# Patient Record
Sex: FEMALE | Race: WHITE | NOT HISPANIC OR LATINO | URBAN - METROPOLITAN AREA
[De-identification: names, ages, dates, MRNs, and addresses within clinical notes are randomized per-mention and may not be internally consistent; named-entity substitution may affect disease eponyms.]

---

## 2019-03-11 ENCOUNTER — EMERGENCY (EMERGENCY)
Facility: HOSPITAL | Age: 79
LOS: 1 days | Discharge: HOME | End: 2019-03-11
Attending: EMERGENCY MEDICINE | Admitting: EMERGENCY MEDICINE
Payer: COMMERCIAL

## 2019-03-11 VITALS
SYSTOLIC BLOOD PRESSURE: 123 MMHG | TEMPERATURE: 98 F | HEIGHT: 63 IN | RESPIRATION RATE: 18 BRPM | HEART RATE: 75 BPM | OXYGEN SATURATION: 96 % | DIASTOLIC BLOOD PRESSURE: 69 MMHG | WEIGHT: 115.08 LBS

## 2019-03-11 VITALS
OXYGEN SATURATION: 97 % | SYSTOLIC BLOOD PRESSURE: 113 MMHG | DIASTOLIC BLOOD PRESSURE: 63 MMHG | HEART RATE: 70 BPM | RESPIRATION RATE: 15 BRPM | TEMPERATURE: 98 F

## 2019-03-11 LAB
ALBUMIN SERPL ELPH-MCNC: 3.7 G/DL — SIGNIFICANT CHANGE UP (ref 3.3–5)
ALP SERPL-CCNC: 70 U/L — SIGNIFICANT CHANGE UP (ref 40–120)
ALT FLD-CCNC: 26 U/L — SIGNIFICANT CHANGE UP (ref 12–78)
ANION GAP SERPL CALC-SCNC: 8 MMOL/L — SIGNIFICANT CHANGE UP (ref 5–17)
APPEARANCE UR: CLEAR — SIGNIFICANT CHANGE UP
AST SERPL-CCNC: 17 U/L — SIGNIFICANT CHANGE UP (ref 15–37)
BASOPHILS # BLD AUTO: 0.03 K/UL — SIGNIFICANT CHANGE UP (ref 0–0.2)
BASOPHILS NFR BLD AUTO: 0.3 % — SIGNIFICANT CHANGE UP (ref 0–2)
BILIRUB SERPL-MCNC: 0.6 MG/DL — SIGNIFICANT CHANGE UP (ref 0.2–1.2)
BILIRUB UR-MCNC: NEGATIVE — SIGNIFICANT CHANGE UP
BUN SERPL-MCNC: 15 MG/DL — SIGNIFICANT CHANGE UP (ref 7–23)
CALCIUM SERPL-MCNC: 9 MG/DL — SIGNIFICANT CHANGE UP (ref 8.5–10.1)
CHLORIDE SERPL-SCNC: 103 MMOL/L — SIGNIFICANT CHANGE UP (ref 96–108)
CO2 SERPL-SCNC: 29 MMOL/L — SIGNIFICANT CHANGE UP (ref 22–31)
COLOR SPEC: SIGNIFICANT CHANGE UP
CREAT SERPL-MCNC: 0.91 MG/DL — SIGNIFICANT CHANGE UP (ref 0.5–1.3)
DIFF PNL FLD: NEGATIVE — SIGNIFICANT CHANGE UP
EOSINOPHIL # BLD AUTO: 0.03 K/UL — SIGNIFICANT CHANGE UP (ref 0–0.5)
EOSINOPHIL NFR BLD AUTO: 0.3 % — SIGNIFICANT CHANGE UP (ref 0–6)
GLUCOSE SERPL-MCNC: 95 MG/DL — SIGNIFICANT CHANGE UP (ref 70–99)
GLUCOSE UR QL: NEGATIVE — SIGNIFICANT CHANGE UP
HCT VFR BLD CALC: 37.2 % — SIGNIFICANT CHANGE UP (ref 34.5–45)
HGB BLD-MCNC: 12.5 G/DL — SIGNIFICANT CHANGE UP (ref 11.5–15.5)
IMM GRANULOCYTES NFR BLD AUTO: 0.2 % — SIGNIFICANT CHANGE UP (ref 0–1.5)
KETONES UR-MCNC: NEGATIVE — SIGNIFICANT CHANGE UP
LEUKOCYTE ESTERASE UR-ACNC: NEGATIVE — SIGNIFICANT CHANGE UP
LIDOCAIN IGE QN: 117 U/L — SIGNIFICANT CHANGE UP (ref 73–393)
LYMPHOCYTES # BLD AUTO: 0.84 K/UL — LOW (ref 1–3.3)
LYMPHOCYTES # BLD AUTO: 8.6 % — LOW (ref 13–44)
MCHC RBC-ENTMCNC: 32 PG — SIGNIFICANT CHANGE UP (ref 27–34)
MCHC RBC-ENTMCNC: 33.6 GM/DL — SIGNIFICANT CHANGE UP (ref 32–36)
MCV RBC AUTO: 95.1 FL — SIGNIFICANT CHANGE UP (ref 80–100)
MONOCYTES # BLD AUTO: 0.65 K/UL — SIGNIFICANT CHANGE UP (ref 0–0.9)
MONOCYTES NFR BLD AUTO: 6.6 % — SIGNIFICANT CHANGE UP (ref 2–14)
NEUTROPHILS # BLD AUTO: 8.21 K/UL — HIGH (ref 1.8–7.4)
NEUTROPHILS NFR BLD AUTO: 84 % — HIGH (ref 43–77)
NITRITE UR-MCNC: NEGATIVE — SIGNIFICANT CHANGE UP
NRBC # BLD: 0 /100 WBCS — SIGNIFICANT CHANGE UP (ref 0–0)
PH UR: 8 — SIGNIFICANT CHANGE UP (ref 5–8)
PLATELET # BLD AUTO: 232 K/UL — SIGNIFICANT CHANGE UP (ref 150–400)
POTASSIUM SERPL-MCNC: 3.7 MMOL/L — SIGNIFICANT CHANGE UP (ref 3.5–5.3)
POTASSIUM SERPL-SCNC: 3.7 MMOL/L — SIGNIFICANT CHANGE UP (ref 3.5–5.3)
PROT SERPL-MCNC: 8.3 G/DL — SIGNIFICANT CHANGE UP (ref 6–8.3)
PROT UR-MCNC: NEGATIVE — SIGNIFICANT CHANGE UP
RBC # BLD: 3.91 M/UL — SIGNIFICANT CHANGE UP (ref 3.8–5.2)
RBC # FLD: 12.4 % — SIGNIFICANT CHANGE UP (ref 10.3–14.5)
SODIUM SERPL-SCNC: 140 MMOL/L — SIGNIFICANT CHANGE UP (ref 135–145)
SP GR SPEC: 1 — LOW (ref 1.01–1.02)
UROBILINOGEN FLD QL: NEGATIVE — SIGNIFICANT CHANGE UP
WBC # BLD: 9.78 K/UL — SIGNIFICANT CHANGE UP (ref 3.8–10.5)
WBC # FLD AUTO: 9.78 K/UL — SIGNIFICANT CHANGE UP (ref 3.8–10.5)

## 2019-03-11 PROCEDURE — 80053 COMPREHEN METABOLIC PANEL: CPT

## 2019-03-11 PROCEDURE — 83690 ASSAY OF LIPASE: CPT

## 2019-03-11 PROCEDURE — 74177 CT ABD & PELVIS W/CONTRAST: CPT | Mod: 26

## 2019-03-11 PROCEDURE — 74177 CT ABD & PELVIS W/CONTRAST: CPT

## 2019-03-11 PROCEDURE — 99284 EMERGENCY DEPT VISIT MOD MDM: CPT

## 2019-03-11 PROCEDURE — 87086 URINE CULTURE/COLONY COUNT: CPT

## 2019-03-11 PROCEDURE — 81003 URINALYSIS AUTO W/O SCOPE: CPT

## 2019-03-11 PROCEDURE — 99284 EMERGENCY DEPT VISIT MOD MDM: CPT | Mod: 25

## 2019-03-11 PROCEDURE — 36415 COLL VENOUS BLD VENIPUNCTURE: CPT

## 2019-03-11 PROCEDURE — 85027 COMPLETE CBC AUTOMATED: CPT

## 2019-03-11 RX ORDER — METRONIDAZOLE 500 MG
1 TABLET ORAL
Qty: 30 | Refills: 0 | OUTPATIENT
Start: 2019-03-11 | End: 2019-03-20

## 2019-03-11 RX ORDER — LEVOTHYROXINE SODIUM 125 MCG
1 TABLET ORAL
Qty: 30 | Refills: 0 | OUTPATIENT
Start: 2019-03-11 | End: 2019-04-09

## 2019-03-11 RX ORDER — IOHEXOL 300 MG/ML
30 INJECTION, SOLUTION INTRAVENOUS ONCE
Qty: 0 | Refills: 0 | Status: COMPLETED | OUTPATIENT
Start: 2019-03-11 | End: 2019-03-11

## 2019-03-11 RX ORDER — SODIUM CHLORIDE 9 MG/ML
3 INJECTION INTRAMUSCULAR; INTRAVENOUS; SUBCUTANEOUS ONCE
Qty: 0 | Refills: 0 | Status: COMPLETED | OUTPATIENT
Start: 2019-03-11 | End: 2019-03-11

## 2019-03-11 RX ORDER — ONDANSETRON 8 MG/1
1 TABLET, FILM COATED ORAL
Qty: 21 | Refills: 0 | OUTPATIENT
Start: 2019-03-11 | End: 2019-03-17

## 2019-03-11 RX ORDER — SODIUM CHLORIDE 9 MG/ML
1000 INJECTION INTRAMUSCULAR; INTRAVENOUS; SUBCUTANEOUS ONCE
Qty: 0 | Refills: 0 | Status: COMPLETED | OUTPATIENT
Start: 2019-03-11 | End: 2019-03-11

## 2019-03-11 RX ADMIN — IOHEXOL 30 MILLILITER(S): 300 INJECTION, SOLUTION INTRAVENOUS at 13:56

## 2019-03-11 RX ADMIN — SODIUM CHLORIDE 3 MILLILITER(S): 9 INJECTION INTRAMUSCULAR; INTRAVENOUS; SUBCUTANEOUS at 13:20

## 2019-03-11 RX ADMIN — SODIUM CHLORIDE 1000 MILLILITER(S): 9 INJECTION INTRAMUSCULAR; INTRAVENOUS; SUBCUTANEOUS at 13:56

## 2019-03-11 NOTE — ED PROVIDER NOTE - OBJECTIVE STATEMENT
Pt is a 80 yo female who presents to the ED with a cc of abdominal pain.  PMHx of hypothyroidism, HLD, diverticulosis.  Pt reports that she has been experiencing intermittent RLQ colicky pain but that symptoms became more constant and increased in severity yesterday evening.  Pt reports that around 11 pm pt awoke with severe stabbing RLQ pain worse with movement.  Pt reports that the pain kept her up Pt is a 80 yo female who presents to the ED with a cc of abdominal pain.  PMHx of hypothyroidism, HLD, diverticulosis.  Pt reports that she has been experiencing intermittent RLQ colicky pain but that symptoms became more constant and increased in severity yesterday evening.  Pt reports that around 11 pm pt awoke with severe stabbing RLQ pain worse with movement.  Pt reports that the pain kept her up throughout the night.  She does report that she had been constipated throughout the week but this morning prior to arrival she was able to have a bowel movement non bloody and soft in nature.  She further reports that upon arrival to the ED symptoms improved.  Pt spoke with her GI physician and was told to come to the ED for further work up.  She reports that she does have a history of diverticulosis and has had multiple flares her last flare being in 2017.  Pt reports that her last colonoscopy was 10 yr ago although she did cologuard this past January. Denies fever, chills, N/V, CP, SOB, ext numbness or weakness

## 2019-03-11 NOTE — ED PROVIDER NOTE - NS ED NOTE AC HIGH RISK COUNTRIES
Chief Complaint   Patient presents with    Fever    Ear Pain     left    Sore Throat     1. Have you been to the ER, urgent care clinic since your last visit? Hospitalized since your last visit? No    2. Have you seen or consulted any other health care providers outside of the [address] [place] [address] since your last visit? Include any pap smears or colon screening.  No No

## 2019-03-11 NOTE — ED PROVIDER NOTE - PLAN OF CARE
Return to the ED for any new or worsening symptoms  Take your medication as prescribed  Augmentin 1 tab 2 times a day   Flagyl 1 tab 3 times a day   Zofran per label instructions as needed for nausea   Clear liquids advance as tolerated   Follow up with your gastroenterologist within the week   Advance activity as tolerated

## 2019-03-11 NOTE — ED PROVIDER NOTE - CARE PLAN
Principal Discharge DX:	Diverticulitis  Assessment and plan of treatment:	Return to the ED for any new or worsening symptoms  Take your medication as prescribed  Augmentin 1 tab 2 times a day   Flagyl 1 tab 3 times a day   Zofran per label instructions as needed for nausea   Clear liquids advance as tolerated   Follow up with your gastroenterologist within the week   Advance activity as tolerated  Secondary Diagnosis:	Abdominal pain

## 2019-03-11 NOTE — ED PROVIDER NOTE - PROGRESS NOTE DETAILS
Spoke with Dr. Parisi regarding CT abd/pelvis.  Diverticulitis noted.  Pt with normal WBC and no active nausea and vomiting.  Pt stable for discharge home with outpatient follow up.  Results of labs and images reviewed, copy provided, all questions answered.

## 2019-03-12 LAB
CULTURE RESULTS: SIGNIFICANT CHANGE UP
SPECIMEN SOURCE: SIGNIFICANT CHANGE UP

## 2019-03-15 ENCOUNTER — EMERGENCY (EMERGENCY)
Facility: HOSPITAL | Age: 79
LOS: 1 days | Discharge: HOME | End: 2019-03-15
Attending: INTERNAL MEDICINE | Admitting: EMERGENCY MEDICINE
Payer: COMMERCIAL

## 2019-03-15 VITALS
HEART RATE: 70 BPM | OXYGEN SATURATION: 98 % | RESPIRATION RATE: 14 BRPM | DIASTOLIC BLOOD PRESSURE: 70 MMHG | TEMPERATURE: 98 F | SYSTOLIC BLOOD PRESSURE: 120 MMHG

## 2019-03-15 VITALS
HEIGHT: 63 IN | TEMPERATURE: 98 F | OXYGEN SATURATION: 97 % | WEIGHT: 149.91 LBS | DIASTOLIC BLOOD PRESSURE: 67 MMHG | SYSTOLIC BLOOD PRESSURE: 125 MMHG | RESPIRATION RATE: 16 BRPM | HEART RATE: 74 BPM

## 2019-03-15 DIAGNOSIS — Z98.890 OTHER SPECIFIED POSTPROCEDURAL STATES: Chronic | ICD-10-CM

## 2019-03-15 PROBLEM — E03.9 HYPOTHYROIDISM, UNSPECIFIED: Chronic | Status: ACTIVE | Noted: 2019-03-11

## 2019-03-15 PROBLEM — E78.5 HYPERLIPIDEMIA, UNSPECIFIED: Chronic | Status: ACTIVE | Noted: 2019-03-11

## 2019-03-15 PROBLEM — K57.90 DIVERTICULOSIS OF INTESTINE, PART UNSPECIFIED, WITHOUT PERFORATION OR ABSCESS WITHOUT BLEEDING: Chronic | Status: ACTIVE | Noted: 2019-03-11

## 2019-03-15 LAB
ALBUMIN SERPL ELPH-MCNC: 3.3 G/DL — SIGNIFICANT CHANGE UP (ref 3.3–5)
ALP SERPL-CCNC: 67 U/L — SIGNIFICANT CHANGE UP (ref 40–120)
ALT FLD-CCNC: 31 U/L — SIGNIFICANT CHANGE UP (ref 12–78)
ANION GAP SERPL CALC-SCNC: 9 MMOL/L — SIGNIFICANT CHANGE UP (ref 5–17)
APPEARANCE UR: CLEAR — SIGNIFICANT CHANGE UP
AST SERPL-CCNC: 31 U/L — SIGNIFICANT CHANGE UP (ref 15–37)
BACTERIA # UR AUTO: NEGATIVE — SIGNIFICANT CHANGE UP
BILIRUB SERPL-MCNC: 0.3 MG/DL — SIGNIFICANT CHANGE UP (ref 0.2–1.2)
BILIRUB UR-MCNC: NEGATIVE — SIGNIFICANT CHANGE UP
BUN SERPL-MCNC: 16 MG/DL — SIGNIFICANT CHANGE UP (ref 7–23)
CALCIUM SERPL-MCNC: 8.9 MG/DL — SIGNIFICANT CHANGE UP (ref 8.5–10.1)
CHLORIDE SERPL-SCNC: 103 MMOL/L — SIGNIFICANT CHANGE UP (ref 96–108)
CO2 SERPL-SCNC: 28 MMOL/L — SIGNIFICANT CHANGE UP (ref 22–31)
COLOR SPEC: YELLOW — SIGNIFICANT CHANGE UP
CREAT SERPL-MCNC: 1 MG/DL — SIGNIFICANT CHANGE UP (ref 0.5–1.3)
DIFF PNL FLD: NEGATIVE — SIGNIFICANT CHANGE UP
EPI CELLS # UR: NEGATIVE — SIGNIFICANT CHANGE UP
GLUCOSE SERPL-MCNC: 113 MG/DL — HIGH (ref 70–99)
GLUCOSE UR QL: NEGATIVE — SIGNIFICANT CHANGE UP
HCT VFR BLD CALC: 35.5 % — SIGNIFICANT CHANGE UP (ref 34.5–45)
HGB BLD-MCNC: 11.9 G/DL — SIGNIFICANT CHANGE UP (ref 11.5–15.5)
INR BLD: 1.27 RATIO — HIGH (ref 0.88–1.16)
KETONES UR-MCNC: NEGATIVE — SIGNIFICANT CHANGE UP
LACTATE SERPL-SCNC: 0.9 MMOL/L — SIGNIFICANT CHANGE UP (ref 0.7–2)
LEUKOCYTE ESTERASE UR-ACNC: ABNORMAL
MCHC RBC-ENTMCNC: 31.9 PG — SIGNIFICANT CHANGE UP (ref 27–34)
MCHC RBC-ENTMCNC: 33.5 GM/DL — SIGNIFICANT CHANGE UP (ref 32–36)
MCV RBC AUTO: 95.2 FL — SIGNIFICANT CHANGE UP (ref 80–100)
NITRITE UR-MCNC: NEGATIVE — SIGNIFICANT CHANGE UP
NRBC # BLD: 0 /100 WBCS — SIGNIFICANT CHANGE UP (ref 0–0)
PH UR: 8 — SIGNIFICANT CHANGE UP (ref 5–8)
PLATELET # BLD AUTO: 230 K/UL — SIGNIFICANT CHANGE UP (ref 150–400)
POTASSIUM SERPL-MCNC: 3.6 MMOL/L — SIGNIFICANT CHANGE UP (ref 3.5–5.3)
POTASSIUM SERPL-SCNC: 3.6 MMOL/L — SIGNIFICANT CHANGE UP (ref 3.5–5.3)
PROT SERPL-MCNC: 7.4 G/DL — SIGNIFICANT CHANGE UP (ref 6–8.3)
PROT UR-MCNC: NEGATIVE — SIGNIFICANT CHANGE UP
PROTHROM AB SERPL-ACNC: 14.5 SEC — HIGH (ref 10–12.9)
RBC # BLD: 3.73 M/UL — LOW (ref 3.8–5.2)
RBC # FLD: 12.3 % — SIGNIFICANT CHANGE UP (ref 10.3–14.5)
RBC CASTS # UR COMP ASSIST: SIGNIFICANT CHANGE UP /HPF (ref 0–4)
SODIUM SERPL-SCNC: 140 MMOL/L — SIGNIFICANT CHANGE UP (ref 135–145)
SP GR SPEC: 1 — LOW (ref 1.01–1.02)
TROPONIN I SERPL-MCNC: <.015 NG/ML — SIGNIFICANT CHANGE UP (ref 0.01–0.04)
UROBILINOGEN FLD QL: NEGATIVE — SIGNIFICANT CHANGE UP
WBC # BLD: 5.7 K/UL — SIGNIFICANT CHANGE UP (ref 3.8–10.5)
WBC # FLD AUTO: 5.7 K/UL — SIGNIFICANT CHANGE UP (ref 3.8–10.5)
WBC UR QL: SIGNIFICANT CHANGE UP

## 2019-03-15 PROCEDURE — 71045 X-RAY EXAM CHEST 1 VIEW: CPT

## 2019-03-15 PROCEDURE — 71045 X-RAY EXAM CHEST 1 VIEW: CPT | Mod: 26

## 2019-03-15 PROCEDURE — 99284 EMERGENCY DEPT VISIT MOD MDM: CPT

## 2019-03-15 PROCEDURE — 76700 US EXAM ABDOM COMPLETE: CPT

## 2019-03-15 PROCEDURE — 93010 ELECTROCARDIOGRAM REPORT: CPT

## 2019-03-15 PROCEDURE — 87086 URINE CULTURE/COLONY COUNT: CPT

## 2019-03-15 PROCEDURE — 93005 ELECTROCARDIOGRAM TRACING: CPT

## 2019-03-15 PROCEDURE — 76700 US EXAM ABDOM COMPLETE: CPT | Mod: 26

## 2019-03-15 PROCEDURE — 84484 ASSAY OF TROPONIN QUANT: CPT

## 2019-03-15 PROCEDURE — 99284 EMERGENCY DEPT VISIT MOD MDM: CPT | Mod: 25

## 2019-03-15 PROCEDURE — 83605 ASSAY OF LACTIC ACID: CPT

## 2019-03-15 PROCEDURE — 80053 COMPREHEN METABOLIC PANEL: CPT

## 2019-03-15 PROCEDURE — 81001 URINALYSIS AUTO W/SCOPE: CPT

## 2019-03-15 PROCEDURE — 85610 PROTHROMBIN TIME: CPT

## 2019-03-15 PROCEDURE — 85027 COMPLETE CBC AUTOMATED: CPT

## 2019-03-15 PROCEDURE — 36415 COLL VENOUS BLD VENIPUNCTURE: CPT

## 2019-03-15 RX ORDER — SODIUM CHLORIDE 9 MG/ML
1000 INJECTION INTRAMUSCULAR; INTRAVENOUS; SUBCUTANEOUS ONCE
Qty: 0 | Refills: 0 | Status: COMPLETED | OUTPATIENT
Start: 2019-03-15 | End: 2019-03-15

## 2019-03-15 RX ADMIN — SODIUM CHLORIDE 1000 MILLILITER(S): 9 INJECTION INTRAMUSCULAR; INTRAVENOUS; SUBCUTANEOUS at 07:47

## 2019-03-15 NOTE — ED PROVIDER NOTE - OBJECTIVE STATEMENT
80 y/o WF h/o Diverticulitis Hyperlipidemia  Hypothyroidism. The patient became faint, diaphoretic and collapsed. She has lower abdominal pain, dark urine, no N/V/D. She did not lose consciousness, No CP, NO SOB, no N/V/D, no fever, no chills no acute stroke symptoms. 78 y/o WF h/o Diverticulitis Hyperlipidemia  Hypothyroidism. The patient became faint but did not pass out. She became diaphoretic, weak at the knees and contused her left buttock. She has lower abdominal pain since Tuesday and is being treated for diverticulitis. Her was dark in appearance. She did not lose consciousness, No CP, NO SOB, no N/V/D, no fever, no chills no acute stroke symptoms. 78 y/o WF h/o Diverticulitis Hyperlipidemia  Hypothyroidism. The patient became faint but did not pass out. She became diaphoretic, weak at the knees and contused her left buttock. She has lower abdominal pain since Tuesday and is being treated for diverticulitis. Her was dark in appearance. She did not lose consciousness, No CP, NO SOB, no N/V/D, no fever, no chills no acute stroke symptoms.  GI attending Dr Oswald

## 2019-03-15 NOTE — ED PROVIDER NOTE - CARE PROVIDER_API CALL
Luis Oswald)  Gastroenterology; Internal Medicine  [address] [place] [address], [address]  [place] [address]  Phone: [phone]  Fax: [phone]  Follow Up Time:

## 2019-03-15 NOTE — ED PROVIDER NOTE - NSFOLLOWUPINSTRUCTIONS_ED_ALL_ED_FT
1) Follow-up with Dr. Oswald. Call today / next business day for prompt follow-up.  2) Return to Emergency room for any worsening or persistent pain, weakness, fever, or any other concerning symptoms.  3) See attached instruction sheets for additional information, including information regarding signs and symptoms to look out for, reasons to seek immediate care and other important instructions.  4) Continue antibiotics as prescribed

## 2019-03-15 NOTE — ED PROVIDER NOTE - CARE PLAN
Principal Discharge DX:	Vagal reaction Principal Discharge DX:	Vagal reaction  Secondary Diagnosis:	Sigmoid diverticulitis

## 2019-03-15 NOTE — ED ADULT NURSE NOTE - NSIMPLEMENTINTERV_GEN_ALL_ED
Implemented All Fall Risk Interventions:  [place] to call system. Call bell, personal items and telephone within reach. Instruct patient to call for assistance. Room bathroom lighting operational. Non-slip footwear when patient is off stretcher. Physically safe environment: no spills, clutter or unnecessary equipment. Stretcher in lowest position, wheels locked, appropriate side rails in place. Provide visual cue, wrist band, yellow gown, etc. Monitor gait and stability. Monitor for mental status changes and reorient to person, place, and time. Review medications for side effects contributing to fall risk. Reinforce activity limits and safety measures with patient and family.

## 2019-03-15 NOTE — ED ADULT TRIAGE NOTE - CHIEF COMPLAINT QUOTE
generalized weakness and lower abdominal pain. " My legs gave out when I walled to the bathroom. I did not pass out. "

## 2019-03-15 NOTE — CONSULT NOTE ADULT - SUBJECTIVE AND OBJECTIVE BOX
79 year old female awoke around 2:45am with severe right sided abdominal pain. She went to the bathroom and nearly fainted from the pain. She also noted her urine to be very dark.    The patient was in this ER earlier this week and CT confirmed Sigmoid diverticulitis  There was mild right hydro with no obvious stone.    Abdomen  Mild RUQ tenderness.  No CVA tenderness..

## 2019-03-16 ENCOUNTER — INPATIENT (INPATIENT)
Facility: HOSPITAL | Age: 79
LOS: 12 days | Discharge: NURSING, REHAB OR LONG-TERM CARE | DRG: 330 | End: 2019-03-29
Attending: INTERNAL MEDICINE | Admitting: INTERNAL MEDICINE
Payer: COMMERCIAL

## 2019-03-16 VITALS
HEART RATE: 84 BPM | WEIGHT: 115.08 LBS | OXYGEN SATURATION: 100 % | HEIGHT: 63 IN | RESPIRATION RATE: 18 BRPM | DIASTOLIC BLOOD PRESSURE: 74 MMHG | TEMPERATURE: 98 F | SYSTOLIC BLOOD PRESSURE: 138 MMHG

## 2019-03-16 DIAGNOSIS — Z98.890 OTHER SPECIFIED POSTPROCEDURAL STATES: Chronic | ICD-10-CM

## 2019-03-16 DIAGNOSIS — K57.92 DIVERTICULITIS OF INTESTINE, PART UNSPECIFIED, WITHOUT PERFORATION OR ABSCESS WITHOUT BLEEDING: ICD-10-CM

## 2019-03-16 LAB
ALBUMIN SERPL ELPH-MCNC: 3.3 G/DL — SIGNIFICANT CHANGE UP (ref 3.3–5)
ALP SERPL-CCNC: 64 U/L — SIGNIFICANT CHANGE UP (ref 40–120)
ALT FLD-CCNC: 29 U/L — SIGNIFICANT CHANGE UP (ref 12–78)
ANION GAP SERPL CALC-SCNC: 8 MMOL/L — SIGNIFICANT CHANGE UP (ref 5–17)
AST SERPL-CCNC: 28 U/L — SIGNIFICANT CHANGE UP (ref 15–37)
BASOPHILS # BLD AUTO: 0.01 K/UL — SIGNIFICANT CHANGE UP (ref 0–0.2)
BASOPHILS NFR BLD AUTO: 0.1 % — SIGNIFICANT CHANGE UP (ref 0–2)
BILIRUB SERPL-MCNC: 0.4 MG/DL — SIGNIFICANT CHANGE UP (ref 0.2–1.2)
BUN SERPL-MCNC: 14 MG/DL — SIGNIFICANT CHANGE UP (ref 7–23)
CALCIUM SERPL-MCNC: 8.7 MG/DL — SIGNIFICANT CHANGE UP (ref 8.5–10.1)
CHLORIDE SERPL-SCNC: 103 MMOL/L — SIGNIFICANT CHANGE UP (ref 96–108)
CO2 SERPL-SCNC: 27 MMOL/L — SIGNIFICANT CHANGE UP (ref 22–31)
CREAT SERPL-MCNC: 0.85 MG/DL — SIGNIFICANT CHANGE UP (ref 0.5–1.3)
CULTURE RESULTS: NO GROWTH — SIGNIFICANT CHANGE UP
EOSINOPHIL # BLD AUTO: 0.01 K/UL — SIGNIFICANT CHANGE UP (ref 0–0.5)
EOSINOPHIL NFR BLD AUTO: 0.1 % — SIGNIFICANT CHANGE UP (ref 0–6)
GLUCOSE SERPL-MCNC: 121 MG/DL — HIGH (ref 70–99)
HCT VFR BLD CALC: 36.8 % — SIGNIFICANT CHANGE UP (ref 34.5–45)
HGB BLD-MCNC: 12.3 G/DL — SIGNIFICANT CHANGE UP (ref 11.5–15.5)
IMM GRANULOCYTES NFR BLD AUTO: 0.4 % — SIGNIFICANT CHANGE UP (ref 0–1.5)
LIDOCAIN IGE QN: 125 U/L — SIGNIFICANT CHANGE UP (ref 73–393)
LYMPHOCYTES # BLD AUTO: 0.61 K/UL — LOW (ref 1–3.3)
LYMPHOCYTES # BLD AUTO: 7.6 % — LOW (ref 13–44)
MCHC RBC-ENTMCNC: 31.5 PG — SIGNIFICANT CHANGE UP (ref 27–34)
MCHC RBC-ENTMCNC: 33.4 GM/DL — SIGNIFICANT CHANGE UP (ref 32–36)
MCV RBC AUTO: 94.1 FL — SIGNIFICANT CHANGE UP (ref 80–100)
MONOCYTES # BLD AUTO: 0.44 K/UL — SIGNIFICANT CHANGE UP (ref 0–0.9)
MONOCYTES NFR BLD AUTO: 5.5 % — SIGNIFICANT CHANGE UP (ref 2–14)
NEUTROPHILS # BLD AUTO: 6.95 K/UL — SIGNIFICANT CHANGE UP (ref 1.8–7.4)
NEUTROPHILS NFR BLD AUTO: 86.3 % — HIGH (ref 43–77)
NRBC # BLD: 0 /100 WBCS — SIGNIFICANT CHANGE UP (ref 0–0)
PLATELET # BLD AUTO: 248 K/UL — SIGNIFICANT CHANGE UP (ref 150–400)
POTASSIUM SERPL-MCNC: 3.6 MMOL/L — SIGNIFICANT CHANGE UP (ref 3.5–5.3)
POTASSIUM SERPL-SCNC: 3.6 MMOL/L — SIGNIFICANT CHANGE UP (ref 3.5–5.3)
PROT SERPL-MCNC: 7.4 G/DL — SIGNIFICANT CHANGE UP (ref 6–8.3)
RBC # BLD: 3.91 M/UL — SIGNIFICANT CHANGE UP (ref 3.8–5.2)
RBC # FLD: 12.3 % — SIGNIFICANT CHANGE UP (ref 10.3–14.5)
SODIUM SERPL-SCNC: 138 MMOL/L — SIGNIFICANT CHANGE UP (ref 135–145)
SPECIMEN SOURCE: SIGNIFICANT CHANGE UP
WBC # BLD: 8.05 K/UL — SIGNIFICANT CHANGE UP (ref 3.8–10.5)
WBC # FLD AUTO: 8.05 K/UL — SIGNIFICANT CHANGE UP (ref 3.8–10.5)

## 2019-03-16 PROCEDURE — 44143 PARTIAL REMOVAL OF COLON: CPT | Mod: AS

## 2019-03-16 PROCEDURE — 99285 EMERGENCY DEPT VISIT HI MDM: CPT

## 2019-03-16 PROCEDURE — 74177 CT ABD & PELVIS W/CONTRAST: CPT | Mod: 26

## 2019-03-16 RX ORDER — HYDROMORPHONE HYDROCHLORIDE 2 MG/ML
0.5 INJECTION INTRAMUSCULAR; INTRAVENOUS; SUBCUTANEOUS EVERY 4 HOURS
Qty: 0 | Refills: 0 | Status: DISCONTINUED | OUTPATIENT
Start: 2019-03-16 | End: 2019-03-19

## 2019-03-16 RX ORDER — SODIUM CHLORIDE 9 MG/ML
1000 INJECTION INTRAMUSCULAR; INTRAVENOUS; SUBCUTANEOUS
Qty: 0 | Refills: 0 | Status: DISCONTINUED | OUTPATIENT
Start: 2019-03-16 | End: 2019-03-19

## 2019-03-16 RX ORDER — LACTOBACILLUS ACIDOPHILUS 100MM CELL
1 CAPSULE ORAL
Qty: 0 | Refills: 0 | Status: DISCONTINUED | OUTPATIENT
Start: 2019-03-16 | End: 2019-03-19

## 2019-03-16 RX ORDER — FAMOTIDINE 10 MG/ML
20 INJECTION INTRAVENOUS ONCE
Qty: 0 | Refills: 0 | Status: COMPLETED | OUTPATIENT
Start: 2019-03-16 | End: 2019-03-16

## 2019-03-16 RX ORDER — LEVOTHYROXINE SODIUM 125 MCG
25 TABLET ORAL DAILY
Qty: 0 | Refills: 0 | Status: DISCONTINUED | OUTPATIENT
Start: 2019-03-16 | End: 2019-03-19

## 2019-03-16 RX ORDER — MORPHINE SULFATE 50 MG/1
4 CAPSULE, EXTENDED RELEASE ORAL ONCE
Qty: 0 | Refills: 0 | Status: DISCONTINUED | OUTPATIENT
Start: 2019-03-16 | End: 2019-03-16

## 2019-03-16 RX ORDER — IOHEXOL 300 MG/ML
30 INJECTION, SOLUTION INTRAVENOUS ONCE
Qty: 0 | Refills: 0 | Status: COMPLETED | OUTPATIENT
Start: 2019-03-16 | End: 2019-03-16

## 2019-03-16 RX ORDER — PIPERACILLIN AND TAZOBACTAM 4; .5 G/20ML; G/20ML
3.38 INJECTION, POWDER, LYOPHILIZED, FOR SOLUTION INTRAVENOUS ONCE
Qty: 0 | Refills: 0 | Status: COMPLETED | OUTPATIENT
Start: 2019-03-16 | End: 2019-03-16

## 2019-03-16 RX ORDER — ACETAMINOPHEN 500 MG
650 TABLET ORAL EVERY 6 HOURS
Qty: 0 | Refills: 0 | Status: DISCONTINUED | OUTPATIENT
Start: 2019-03-16 | End: 2019-03-19

## 2019-03-16 RX ORDER — SODIUM CHLORIDE 9 MG/ML
1000 INJECTION INTRAMUSCULAR; INTRAVENOUS; SUBCUTANEOUS ONCE
Qty: 0 | Refills: 0 | Status: COMPLETED | OUTPATIENT
Start: 2019-03-16 | End: 2019-03-16

## 2019-03-16 RX ORDER — MORPHINE SULFATE 50 MG/1
2 CAPSULE, EXTENDED RELEASE ORAL EVERY 4 HOURS
Qty: 0 | Refills: 0 | Status: DISCONTINUED | OUTPATIENT
Start: 2019-03-16 | End: 2019-03-19

## 2019-03-16 RX ORDER — ONDANSETRON 8 MG/1
4 TABLET, FILM COATED ORAL EVERY 6 HOURS
Qty: 0 | Refills: 0 | Status: DISCONTINUED | OUTPATIENT
Start: 2019-03-16 | End: 2019-03-19

## 2019-03-16 RX ORDER — MORPHINE SULFATE 50 MG/1
2 CAPSULE, EXTENDED RELEASE ORAL ONCE
Qty: 0 | Refills: 0 | Status: DISCONTINUED | OUTPATIENT
Start: 2019-03-16 | End: 2019-03-16

## 2019-03-16 RX ORDER — PIPERACILLIN AND TAZOBACTAM 4; .5 G/20ML; G/20ML
3.38 INJECTION, POWDER, LYOPHILIZED, FOR SOLUTION INTRAVENOUS EVERY 8 HOURS
Qty: 0 | Refills: 0 | Status: DISCONTINUED | OUTPATIENT
Start: 2019-03-17 | End: 2019-03-18

## 2019-03-16 RX ORDER — PANTOPRAZOLE SODIUM 20 MG/1
40 TABLET, DELAYED RELEASE ORAL DAILY
Qty: 0 | Refills: 0 | Status: DISCONTINUED | OUTPATIENT
Start: 2019-03-16 | End: 2019-03-19

## 2019-03-16 RX ORDER — ONDANSETRON 8 MG/1
4 TABLET, FILM COATED ORAL ONCE
Qty: 0 | Refills: 0 | Status: COMPLETED | OUTPATIENT
Start: 2019-03-16 | End: 2019-03-16

## 2019-03-16 RX ADMIN — MORPHINE SULFATE 2 MILLIGRAM(S): 50 CAPSULE, EXTENDED RELEASE ORAL at 14:11

## 2019-03-16 RX ADMIN — FAMOTIDINE 20 MILLIGRAM(S): 10 INJECTION INTRAVENOUS at 14:11

## 2019-03-16 RX ADMIN — MORPHINE SULFATE 4 MILLIGRAM(S): 50 CAPSULE, EXTENDED RELEASE ORAL at 16:40

## 2019-03-16 RX ADMIN — Medication 650 MILLIGRAM(S): at 20:35

## 2019-03-16 RX ADMIN — SODIUM CHLORIDE 1000 MILLILITER(S): 9 INJECTION INTRAMUSCULAR; INTRAVENOUS; SUBCUTANEOUS at 18:37

## 2019-03-16 RX ADMIN — PIPERACILLIN AND TAZOBACTAM 200 GRAM(S): 4; .5 INJECTION, POWDER, LYOPHILIZED, FOR SOLUTION INTRAVENOUS at 18:37

## 2019-03-16 RX ADMIN — HYDROMORPHONE HYDROCHLORIDE 0.5 MILLIGRAM(S): 2 INJECTION INTRAMUSCULAR; INTRAVENOUS; SUBCUTANEOUS at 20:27

## 2019-03-16 RX ADMIN — MORPHINE SULFATE 4 MILLIGRAM(S): 50 CAPSULE, EXTENDED RELEASE ORAL at 16:07

## 2019-03-16 RX ADMIN — ONDANSETRON 4 MILLIGRAM(S): 8 TABLET, FILM COATED ORAL at 14:12

## 2019-03-16 RX ADMIN — MORPHINE SULFATE 2 MILLIGRAM(S): 50 CAPSULE, EXTENDED RELEASE ORAL at 15:30

## 2019-03-16 RX ADMIN — MORPHINE SULFATE 2 MILLIGRAM(S): 50 CAPSULE, EXTENDED RELEASE ORAL at 14:26

## 2019-03-16 RX ADMIN — HYDROMORPHONE HYDROCHLORIDE 0.5 MILLIGRAM(S): 2 INJECTION INTRAMUSCULAR; INTRAVENOUS; SUBCUTANEOUS at 21:00

## 2019-03-16 RX ADMIN — SODIUM CHLORIDE 1000 MILLILITER(S): 9 INJECTION INTRAMUSCULAR; INTRAVENOUS; SUBCUTANEOUS at 14:11

## 2019-03-16 RX ADMIN — MORPHINE SULFATE 2 MILLIGRAM(S): 50 CAPSULE, EXTENDED RELEASE ORAL at 14:53

## 2019-03-16 RX ADMIN — IOHEXOL 30 MILLILITER(S): 300 INJECTION, SOLUTION INTRAVENOUS at 14:53

## 2019-03-16 NOTE — ED ADULT NURSE NOTE - NSIMPLEMENTINTERV_GEN_ALL_ED
Implemented All Universal Safety Interventions:  [place] to call system. Call bell, personal items and telephone within reach. Instruct patient to call for assistance. Room bathroom lighting operational. Non-slip footwear when patient is off stretcher. Physically safe environment: no spills, clutter or unnecessary equipment. Stretcher in lowest position, wheels locked, appropriate side rails in place.

## 2019-03-16 NOTE — ED ADULT NURSE NOTE - OBJECTIVE STATEMENT
Received patient awake and alert x 4, presenting to the ED with C/O abdominal pain along with N/V. States the pain started last night worsening today. Patient was in the ED on 3/11/19 for same complaint, CT scan was performed and showed diverticulosis, placed on antibiotics. Patient is still taking PO antibiotics but states symptoms have not resolved,  at bedside, safety and comfort maintained, will continue to monitor.

## 2019-03-16 NOTE — ED PROVIDER NOTE - CLINICAL SUMMARY MEDICAL DECISION MAKING FREE TEXT BOX
abdominal pain, being treated for diverticulitis, nausea, vomiting, f/u labs, iv fluids, pain control, anti-emetics, consider repeat ct abdomen/pelvis for worsening pain

## 2019-03-16 NOTE — CONSULT NOTE ADULT - SUBJECTIVE AND OBJECTIVE BOX
PT is a 80 yo female presents with diverticulitis. PT presented to ER on 3/11 with similar pain, where she was diagnosed with diverticulitis and sent out on PO abx. PT states she felt a little better first day or so and then pain returned. THe pain continued and came to ER on 3/15 where she was sent home again. PT presented today with increased pain to LUQ, and LLQ. Denies any nausea or vomiting. States having fever. LAst BM yesterday.    REVIEW OF SYSTEMS:    CONSTITUTIONAL: No weakness, fatigue, malaise, fevers or chills, no weight change, appetite change  EYES: No visual changes; No double vision,  No vertigo, eye pain  Ears: no otalgia, no otorhea, no hearing loss, tinnitus  Nose: no epistaxis, rhinorrhea, sinus pressure  Throat: no throat pain, no oral lesions  NECK: No pain or stiffness  RESPIRATORY: No cough (productive or dry), wheezing, hemoptysis; No shortness of breath  CARDIOVASCULAR: No chest pain or palpitations,    GASTROINTESTINAL as above  NEUROLOGICAL: No numbness or weakness, headache, memory loss,   SKIN: No pruritis, rashes, lesions or new moles  Psych: No anxiety, sadness, insomnia,    Endocrine: No Heat or Cold intolerance, polydipsia, polyphagia  Heme/Lymph: no LN enlargement, no easy bruising or bleeding     PAST MEDICAL & SURGICAL HISTORY:  Diverticulosis  Hyperlipidemia  Hypothyroidism  H/O arthroscopic knee surgery    MEDICATIONS  (STANDING):  lactobacillus acidophilus 1 Tablet(s) Oral three times a day with meals  levothyroxine 25 MICROGram(s) Oral daily  pantoprazole  Injectable 40 milliGRAM(s) IV Push daily  piperacillin/tazobactam IVPB. 3.375 Gram(s) IV Intermittent every 8 hours  sodium chloride 0.9%. 1000 milliLiter(s) (100 mL/Hr) IV Continuous <Continuous>    MEDICATIONS  (PRN):  acetaminophen   Tablet .. 650 milliGRAM(s) Oral every 6 hours PRN Temp greater or equal to 38C (100.4F), Mild Pain (1 - 3)  HYDROmorphone  Injectable 0.5 milliGRAM(s) IV Push every 4 hours PRN Severe Pain (7 - 10)  morphine  - Injectable 2 milliGRAM(s) IV Push every 4 hours PRN Moderate Pain (4 - 6)  ondansetron Injectable 4 milliGRAM(s) IV Push every 6 hours PRN Nausea and/or Vomiting  Allergies    clindamycin (Stomach Upset)    Intolerances    SH-neg x3    .  VITAL SIGNS:  T(C): 38.1 (03-16-19 @ 20:25), Max: 38.1 (03-16-19 @ 20:25)  T(F): 100.5 (03-16-19 @ 20:25), Max: 100.5 (03-16-19 @ 20:25)  HR: 76 (03-16-19 @ 20:25) (76 - 86)  BP: 127/71 (03-16-19 @ 20:25) (127/71 - 138/74)  BP(mean): --  RR: 16 (03-16-19 @ 20:25) (16 - 18)  SpO2: 100% (03-16-19 @ 20:25) (99% - 100%)  Wt(kg): --    PHYSICAL EXAM:    Constitutional:  NAD, resting comfortably in bed  Head: NC/AT  Eyes: PERRL b/l  ENT: MMM  Neck: supple; no JVD or thyromegaly  Respiratory: CTA B/L   Cardiac: +S1/S2; RRR   Gastrointestinal: soft, ND, tender LUQ, LLQ  Back: no CVA B/L  Neurologic: AAOx3; CNII-XII grossly intact; no focal deficits  Psychiatric: affect and characteristics of appearance, verbalizations, behaviors are appropriate                          12.3   8.05  )-----------( 248      ( 16 Mar 2019 14:15 )             36.8   03-16    138  |  103  |  14  ----------------------------<  121<H>  3.6   |  27  |  0.85    Ca    8.7      16 Mar 2019 14:15    TPro  7.4  /  Alb  3.3  /  TBili  0.4  /  DBili  x   /  AST  28  /  ALT  29  /  AlkPhos  64  03-16  < from: CT Abdomen and Pelvis w/ Oral Cont and w/ IV Cont (03.16.19 @ 17:07) >    Persistent abnormality in the region of the sigmoid colon without change   in appearance since the prior study. There are pericolonic inflammatory   changes in this region suggesting diverticulitis. No evidence of   associated abscess. There is now a small amount of ascites in the upper   abdomen adjacent to the liver which is a new finding since the prior   examination..        < end of copied text >

## 2019-03-16 NOTE — ED PROVIDER NOTE - OBJECTIVE STATEMENT
79 female presents to ER c/o severe abdominal pain, nausea and vomiting, started last night, worsening today. Patient states she came to ER 3/11/19 for abdominal pain and had ct abdomen/pelvis, told she had diverticulitis, placed on Augmentin and Flagyl, patient again came to ER 3/15/19 for abdominal pain, had labs and US abdomen done, pain resolved and went home. Patient had normal small BM today.  GI- Pervil

## 2019-03-17 LAB
ANION GAP SERPL CALC-SCNC: 8 MMOL/L — SIGNIFICANT CHANGE UP (ref 5–17)
BASOPHILS # BLD AUTO: 0.02 K/UL — SIGNIFICANT CHANGE UP (ref 0–0.2)
BASOPHILS NFR BLD AUTO: 0.2 % — SIGNIFICANT CHANGE UP (ref 0–2)
BUN SERPL-MCNC: 9 MG/DL — SIGNIFICANT CHANGE UP (ref 7–23)
CALCIUM SERPL-MCNC: 7.7 MG/DL — LOW (ref 8.5–10.1)
CHLORIDE SERPL-SCNC: 106 MMOL/L — SIGNIFICANT CHANGE UP (ref 96–108)
CO2 SERPL-SCNC: 27 MMOL/L — SIGNIFICANT CHANGE UP (ref 22–31)
CREAT SERPL-MCNC: 0.92 MG/DL — SIGNIFICANT CHANGE UP (ref 0.5–1.3)
CRP SERPL-MCNC: 3.29 MG/DL — HIGH (ref 0–0.4)
EOSINOPHIL # BLD AUTO: 0.06 K/UL — SIGNIFICANT CHANGE UP (ref 0–0.5)
EOSINOPHIL NFR BLD AUTO: 0.5 % — SIGNIFICANT CHANGE UP (ref 0–6)
ERYTHROCYTE [SEDIMENTATION RATE] IN BLOOD: 39 MM/HR — HIGH (ref 0–20)
GLUCOSE SERPL-MCNC: 110 MG/DL — HIGH (ref 70–99)
HCT VFR BLD CALC: 32.5 % — LOW (ref 34.5–45)
HGB BLD-MCNC: 10.7 G/DL — LOW (ref 11.5–15.5)
IMM GRANULOCYTES NFR BLD AUTO: 0.3 % — SIGNIFICANT CHANGE UP (ref 0–1.5)
LYMPHOCYTES # BLD AUTO: 0.9 K/UL — LOW (ref 1–3.3)
LYMPHOCYTES # BLD AUTO: 8.2 % — LOW (ref 13–44)
MAGNESIUM SERPL-MCNC: 1.9 MG/DL — SIGNIFICANT CHANGE UP (ref 1.6–2.6)
MCHC RBC-ENTMCNC: 31.8 PG — SIGNIFICANT CHANGE UP (ref 27–34)
MCHC RBC-ENTMCNC: 32.9 GM/DL — SIGNIFICANT CHANGE UP (ref 32–36)
MCV RBC AUTO: 96.7 FL — SIGNIFICANT CHANGE UP (ref 80–100)
MONOCYTES # BLD AUTO: 0.93 K/UL — HIGH (ref 0–0.9)
MONOCYTES NFR BLD AUTO: 8.5 % — SIGNIFICANT CHANGE UP (ref 2–14)
NEUTROPHILS # BLD AUTO: 9.05 K/UL — HIGH (ref 1.8–7.4)
NEUTROPHILS NFR BLD AUTO: 82.3 % — HIGH (ref 43–77)
NRBC # BLD: 0 /100 WBCS — SIGNIFICANT CHANGE UP (ref 0–0)
PLATELET # BLD AUTO: 209 K/UL — SIGNIFICANT CHANGE UP (ref 150–400)
POTASSIUM SERPL-MCNC: 3.8 MMOL/L — SIGNIFICANT CHANGE UP (ref 3.5–5.3)
POTASSIUM SERPL-SCNC: 3.8 MMOL/L — SIGNIFICANT CHANGE UP (ref 3.5–5.3)
RBC # BLD: 3.36 M/UL — LOW (ref 3.8–5.2)
RBC # FLD: 12.6 % — SIGNIFICANT CHANGE UP (ref 10.3–14.5)
SODIUM SERPL-SCNC: 141 MMOL/L — SIGNIFICANT CHANGE UP (ref 135–145)
WBC # BLD: 10.99 K/UL — HIGH (ref 3.8–10.5)
WBC # FLD AUTO: 10.99 K/UL — HIGH (ref 3.8–10.5)

## 2019-03-17 RX ADMIN — PIPERACILLIN AND TAZOBACTAM 25 GRAM(S): 4; .5 INJECTION, POWDER, LYOPHILIZED, FOR SOLUTION INTRAVENOUS at 02:06

## 2019-03-17 RX ADMIN — HYDROMORPHONE HYDROCHLORIDE 0.5 MILLIGRAM(S): 2 INJECTION INTRAMUSCULAR; INTRAVENOUS; SUBCUTANEOUS at 08:30

## 2019-03-17 RX ADMIN — HYDROMORPHONE HYDROCHLORIDE 0.5 MILLIGRAM(S): 2 INJECTION INTRAMUSCULAR; INTRAVENOUS; SUBCUTANEOUS at 05:15

## 2019-03-17 RX ADMIN — MORPHINE SULFATE 2 MILLIGRAM(S): 50 CAPSULE, EXTENDED RELEASE ORAL at 00:33

## 2019-03-17 RX ADMIN — PANTOPRAZOLE SODIUM 40 MILLIGRAM(S): 20 TABLET, DELAYED RELEASE ORAL at 05:40

## 2019-03-17 RX ADMIN — PIPERACILLIN AND TAZOBACTAM 25 GRAM(S): 4; .5 INJECTION, POWDER, LYOPHILIZED, FOR SOLUTION INTRAVENOUS at 10:53

## 2019-03-17 RX ADMIN — Medication 650 MILLIGRAM(S): at 23:39

## 2019-03-17 RX ADMIN — HYDROMORPHONE HYDROCHLORIDE 0.5 MILLIGRAM(S): 2 INJECTION INTRAMUSCULAR; INTRAVENOUS; SUBCUTANEOUS at 13:28

## 2019-03-17 RX ADMIN — PIPERACILLIN AND TAZOBACTAM 25 GRAM(S): 4; .5 INJECTION, POWDER, LYOPHILIZED, FOR SOLUTION INTRAVENOUS at 17:18

## 2019-03-17 RX ADMIN — HYDROMORPHONE HYDROCHLORIDE 0.5 MILLIGRAM(S): 2 INJECTION INTRAMUSCULAR; INTRAVENOUS; SUBCUTANEOUS at 04:16

## 2019-03-17 RX ADMIN — Medication 1 TABLET(S): at 11:52

## 2019-03-17 RX ADMIN — Medication 1 TABLET(S): at 17:18

## 2019-03-17 RX ADMIN — MORPHINE SULFATE 2 MILLIGRAM(S): 50 CAPSULE, EXTENDED RELEASE ORAL at 01:00

## 2019-03-17 RX ADMIN — HYDROMORPHONE HYDROCHLORIDE 0.5 MILLIGRAM(S): 2 INJECTION INTRAMUSCULAR; INTRAVENOUS; SUBCUTANEOUS at 17:37

## 2019-03-17 RX ADMIN — Medication 1 TABLET(S): at 08:13

## 2019-03-17 RX ADMIN — HYDROMORPHONE HYDROCHLORIDE 0.5 MILLIGRAM(S): 2 INJECTION INTRAMUSCULAR; INTRAVENOUS; SUBCUTANEOUS at 08:14

## 2019-03-17 RX ADMIN — Medication 25 MICROGRAM(S): at 05:40

## 2019-03-17 RX ADMIN — HYDROMORPHONE HYDROCHLORIDE 0.5 MILLIGRAM(S): 2 INJECTION INTRAMUSCULAR; INTRAVENOUS; SUBCUTANEOUS at 13:45

## 2019-03-17 NOTE — H&P ADULT - HISTORY OF PRESENT ILLNESS
79 female with PMH of hypothyroidism HLD diverticulitis presents to ER c/o severe abdominal pain, nausea and vomiting, started two nights ago and worsened yesterday. Patient states she came to ER 3/11/19 for abdominal pain and had ct abdomen/pelvis, told she had diverticulitis, placed on Augmentin and Flagyl. Patient again came to ER 3/15/19 for abdominal pain, had labs and US abdomen done, pain resolved and went home. Patient had normal small BM today. Denies fevers, chills, constipation or diarrhea. She reports that this her fourth episode of diverticulitis. Last episode was in December 2017.

## 2019-03-17 NOTE — PROGRESS NOTE ADULT - SUBJECTIVE AND OBJECTIVE BOX
pt seen  feeling little better  -n-v  -f-bm  ICU Vital Signs Last 24 Hrs  T(C): 37.6 (17 Mar 2019 08:00), Max: 38.1 (16 Mar 2019 20:25)  T(F): 99.7 (17 Mar 2019 08:00), Max: 100.5 (16 Mar 2019 20:25)  HR: 84 (17 Mar 2019 08:00) (69 - 86)  BP: 93/54 (17 Mar 2019 08:00) (93/54 - 138/74)  BP(mean): --  ABP: --  ABP(mean): --  RR: 18 (17 Mar 2019 08:00) (16 - 18)  SpO2: 92% (17 Mar 2019 08:00) (92% - 100%)  gen-NAD  resp-clear  abd-soft, LUQ tenderness still present, -rebound, -guarding                          10.7   10.99 )-----------( 209      ( 17 Mar 2019 05:06 )             32.5   03-17    141  |  106  |  9   ----------------------------<  110<H>  3.8   |  27  |  0.92    Ca    7.7<L>      17 Mar 2019 05:06  Mg     1.9     03-17    TPro  7.4  /  Alb  3.3  /  TBili  0.4  /  DBili  x   /  AST  28  /  ALT  29  /  AlkPhos  64  03-16

## 2019-03-17 NOTE — PROGRESS NOTE ADULT - ASSESSMENT
78 yo with diverticulitis failed conservative outpt management, still with significant pain. Explained to pt possibility of surgical intervention if no improvement     cont NPO/IVF/IV Abx     AM labs

## 2019-03-18 LAB
ANION GAP SERPL CALC-SCNC: 10 MMOL/L — SIGNIFICANT CHANGE UP (ref 5–17)
BASOPHILS # BLD AUTO: 0.03 K/UL — SIGNIFICANT CHANGE UP (ref 0–0.2)
BASOPHILS NFR BLD AUTO: 0.2 % — SIGNIFICANT CHANGE UP (ref 0–2)
BUN SERPL-MCNC: 17 MG/DL — SIGNIFICANT CHANGE UP (ref 7–23)
CALCIUM SERPL-MCNC: 7.5 MG/DL — LOW (ref 8.5–10.1)
CHLORIDE SERPL-SCNC: 110 MMOL/L — HIGH (ref 96–108)
CO2 SERPL-SCNC: 23 MMOL/L — SIGNIFICANT CHANGE UP (ref 22–31)
CREAT SERPL-MCNC: 0.8 MG/DL — SIGNIFICANT CHANGE UP (ref 0.5–1.3)
EOSINOPHIL # BLD AUTO: 0.04 K/UL — SIGNIFICANT CHANGE UP (ref 0–0.5)
EOSINOPHIL NFR BLD AUTO: 0.3 % — SIGNIFICANT CHANGE UP (ref 0–6)
GLUCOSE SERPL-MCNC: 73 MG/DL — SIGNIFICANT CHANGE UP (ref 70–99)
HCT VFR BLD CALC: 32.7 % — LOW (ref 34.5–45)
HCT VFR BLD CALC: 32.9 % — LOW (ref 34.5–45)
HGB BLD-MCNC: 10.6 G/DL — LOW (ref 11.5–15.5)
HGB BLD-MCNC: 10.8 G/DL — LOW (ref 11.5–15.5)
IMM GRANULOCYTES NFR BLD AUTO: 0.5 % — SIGNIFICANT CHANGE UP (ref 0–1.5)
LYMPHOCYTES # BLD AUTO: 1.16 K/UL — SIGNIFICANT CHANGE UP (ref 1–3.3)
LYMPHOCYTES # BLD AUTO: 8.7 % — LOW (ref 13–44)
MAGNESIUM SERPL-MCNC: 2.1 MG/DL — SIGNIFICANT CHANGE UP (ref 1.6–2.6)
MCHC RBC-ENTMCNC: 31.5 PG — SIGNIFICANT CHANGE UP (ref 27–34)
MCHC RBC-ENTMCNC: 31.8 PG — SIGNIFICANT CHANGE UP (ref 27–34)
MCHC RBC-ENTMCNC: 32.4 GM/DL — SIGNIFICANT CHANGE UP (ref 32–36)
MCHC RBC-ENTMCNC: 32.8 GM/DL — SIGNIFICANT CHANGE UP (ref 32–36)
MCV RBC AUTO: 96.8 FL — SIGNIFICANT CHANGE UP (ref 80–100)
MCV RBC AUTO: 97 FL — SIGNIFICANT CHANGE UP (ref 80–100)
MONOCYTES # BLD AUTO: 1.01 K/UL — HIGH (ref 0–0.9)
MONOCYTES NFR BLD AUTO: 7.6 % — SIGNIFICANT CHANGE UP (ref 2–14)
NEUTROPHILS # BLD AUTO: 11.06 K/UL — HIGH (ref 1.8–7.4)
NEUTROPHILS NFR BLD AUTO: 82.7 % — HIGH (ref 43–77)
NRBC # BLD: 0 /100 WBCS — SIGNIFICANT CHANGE UP (ref 0–0)
NRBC # BLD: 0 /100 WBCS — SIGNIFICANT CHANGE UP (ref 0–0)
PLATELET # BLD AUTO: 221 K/UL — SIGNIFICANT CHANGE UP (ref 150–400)
PLATELET # BLD AUTO: 252 K/UL — SIGNIFICANT CHANGE UP (ref 150–400)
POTASSIUM SERPL-MCNC: 3.3 MMOL/L — LOW (ref 3.5–5.3)
POTASSIUM SERPL-SCNC: 3.3 MMOL/L — LOW (ref 3.5–5.3)
RBC # BLD: 3.37 M/UL — LOW (ref 3.8–5.2)
RBC # BLD: 3.4 M/UL — LOW (ref 3.8–5.2)
RBC # FLD: 12.8 % — SIGNIFICANT CHANGE UP (ref 10.3–14.5)
RBC # FLD: 13.2 % — SIGNIFICANT CHANGE UP (ref 10.3–14.5)
SODIUM SERPL-SCNC: 143 MMOL/L — SIGNIFICANT CHANGE UP (ref 135–145)
WBC # BLD: 13.37 K/UL — HIGH (ref 3.8–10.5)
WBC # BLD: 16.32 K/UL — HIGH (ref 3.8–10.5)
WBC # FLD AUTO: 13.37 K/UL — HIGH (ref 3.8–10.5)
WBC # FLD AUTO: 16.32 K/UL — HIGH (ref 3.8–10.5)

## 2019-03-18 RX ORDER — MEROPENEM 1 G/30ML
INJECTION INTRAVENOUS
Qty: 0 | Refills: 0 | Status: DISCONTINUED | OUTPATIENT
Start: 2019-03-18 | End: 2019-03-19

## 2019-03-18 RX ORDER — CYCLOSPORINE 0.5 MG/ML
1 EMULSION OPHTHALMIC
Qty: 0 | Refills: 0 | Status: DISCONTINUED | OUTPATIENT
Start: 2019-03-18 | End: 2019-03-19

## 2019-03-18 RX ORDER — ATORVASTATIN CALCIUM 80 MG/1
10 TABLET, FILM COATED ORAL AT BEDTIME
Qty: 0 | Refills: 0 | Status: DISCONTINUED | OUTPATIENT
Start: 2019-03-18 | End: 2019-03-19

## 2019-03-18 RX ORDER — OMEGA-3 ACID ETHYL ESTERS 1 G
1 CAPSULE ORAL
Qty: 0 | Refills: 0 | COMMUNITY

## 2019-03-18 RX ORDER — MEROPENEM 1 G/30ML
1000 INJECTION INTRAVENOUS EVERY 8 HOURS
Qty: 0 | Refills: 0 | Status: DISCONTINUED | OUTPATIENT
Start: 2019-03-18 | End: 2019-03-19

## 2019-03-18 RX ORDER — MEROPENEM 1 G/30ML
1000 INJECTION INTRAVENOUS ONCE
Qty: 0 | Refills: 0 | Status: COMPLETED | OUTPATIENT
Start: 2019-03-18 | End: 2019-03-18

## 2019-03-18 RX ORDER — POTASSIUM CHLORIDE 20 MEQ
40 PACKET (EA) ORAL ONCE
Qty: 0 | Refills: 0 | Status: COMPLETED | OUTPATIENT
Start: 2019-03-18 | End: 2019-03-18

## 2019-03-18 RX ADMIN — MORPHINE SULFATE 2 MILLIGRAM(S): 50 CAPSULE, EXTENDED RELEASE ORAL at 20:57

## 2019-03-18 RX ADMIN — MEROPENEM 100 MILLIGRAM(S): 1 INJECTION INTRAVENOUS at 21:51

## 2019-03-18 RX ADMIN — PANTOPRAZOLE SODIUM 40 MILLIGRAM(S): 20 TABLET, DELAYED RELEASE ORAL at 12:16

## 2019-03-18 RX ADMIN — Medication 40 MILLIEQUIVALENT(S): at 08:59

## 2019-03-18 RX ADMIN — HYDROMORPHONE HYDROCHLORIDE 0.5 MILLIGRAM(S): 2 INJECTION INTRAMUSCULAR; INTRAVENOUS; SUBCUTANEOUS at 12:30

## 2019-03-18 RX ADMIN — MEROPENEM 100 MILLIGRAM(S): 1 INJECTION INTRAVENOUS at 13:58

## 2019-03-18 RX ADMIN — MORPHINE SULFATE 2 MILLIGRAM(S): 50 CAPSULE, EXTENDED RELEASE ORAL at 20:27

## 2019-03-18 RX ADMIN — HYDROMORPHONE HYDROCHLORIDE 0.5 MILLIGRAM(S): 2 INJECTION INTRAMUSCULAR; INTRAVENOUS; SUBCUTANEOUS at 07:14

## 2019-03-18 RX ADMIN — HYDROMORPHONE HYDROCHLORIDE 0.5 MILLIGRAM(S): 2 INJECTION INTRAMUSCULAR; INTRAVENOUS; SUBCUTANEOUS at 01:26

## 2019-03-18 RX ADMIN — HYDROMORPHONE HYDROCHLORIDE 0.5 MILLIGRAM(S): 2 INJECTION INTRAMUSCULAR; INTRAVENOUS; SUBCUTANEOUS at 12:16

## 2019-03-18 RX ADMIN — HYDROMORPHONE HYDROCHLORIDE 0.5 MILLIGRAM(S): 2 INJECTION INTRAMUSCULAR; INTRAVENOUS; SUBCUTANEOUS at 16:35

## 2019-03-18 RX ADMIN — HYDROMORPHONE HYDROCHLORIDE 0.5 MILLIGRAM(S): 2 INJECTION INTRAMUSCULAR; INTRAVENOUS; SUBCUTANEOUS at 16:22

## 2019-03-18 RX ADMIN — Medication 1 TABLET(S): at 18:18

## 2019-03-18 RX ADMIN — ATORVASTATIN CALCIUM 10 MILLIGRAM(S): 80 TABLET, FILM COATED ORAL at 21:51

## 2019-03-18 RX ADMIN — PIPERACILLIN AND TAZOBACTAM 25 GRAM(S): 4; .5 INJECTION, POWDER, LYOPHILIZED, FOR SOLUTION INTRAVENOUS at 01:26

## 2019-03-18 RX ADMIN — HYDROMORPHONE HYDROCHLORIDE 0.5 MILLIGRAM(S): 2 INJECTION INTRAMUSCULAR; INTRAVENOUS; SUBCUTANEOUS at 02:00

## 2019-03-18 RX ADMIN — Medication 1 TABLET(S): at 08:59

## 2019-03-18 RX ADMIN — SODIUM CHLORIDE 100 MILLILITER(S): 9 INJECTION INTRAMUSCULAR; INTRAVENOUS; SUBCUTANEOUS at 15:36

## 2019-03-18 RX ADMIN — HYDROMORPHONE HYDROCHLORIDE 0.5 MILLIGRAM(S): 2 INJECTION INTRAMUSCULAR; INTRAVENOUS; SUBCUTANEOUS at 07:30

## 2019-03-18 RX ADMIN — Medication 25 MICROGRAM(S): at 05:08

## 2019-03-18 RX ADMIN — Medication 1 TABLET(S): at 12:16

## 2019-03-18 NOTE — DISCHARGE NOTE PROVIDER - NSDCCAREPROVSEEN_GEN_ALL_CORE_FT
Perlman, Daryl Perlman, Daryl Vyas, Jagdeep  pt seen and examined   pt is stable for discharge to [facility] and will folow up with pcp

## 2019-03-18 NOTE — DISCHARGE NOTE PROVIDER - CARE PROVIDERS DIRECT ADDRESSES
[email or web address],DirectAddress_Unknown

## 2019-03-18 NOTE — CONSULT NOTE ADULT - ASSESSMENT
IMP:    LLQ abd pain without significant improvement.  In light of her rising WBC and tenderness, surgical intervention is likely.  Suggest: repeat CBC this afternoon and favor surgery if WBC continues to rise.  CBC in AM as well.  Timing of surgery is a surgical decision.

## 2019-03-18 NOTE — PROGRESS NOTE ADULT - ASSESSMENT
This is a 79 F comes with hypothyroidism, HLD, hx of diverticulitis, who presented to the ED with abdominal pain for 1 week, admitted for sigmoid diverticulitis. Patient had fever of 100.8 yesterday, on Zosyn (Day 3, one dose on 3/16), currently NPO, possible exploratory laparotomy tomorrow.       ACUTE SIGMOID DIVERTICULITIS WITHOUT PERFORATION  -CT abdomen: persistent abnormality in the region of the sigmoid colon, pericolonic inflammatory   changes in this region suggesting diverticulitis   -WBC increasing this AM, fever of 100.8 yesterday evening, now afebrile, tylenol for fever   -NPO, surgery Dr. Spencer following, pending possible exploratory laparotomy tomorrow   -Continue with IVF NS @100 ml/hr   -Continue with IV zosyn 3.375g ivpb q8h  -Pain management: Tylenol Q6 mild pain, morphine 2 mg Q4 moderate pain, Dilaudid .5 Q4 severe pain    -Zofran for nausea   -F/u Blood cultures   -Continue with protonix 40 IV daily   -GI consulted, Dr. Burgos, f/u recs     HYPOTHYROID  Chronic issue. Continue synthroid.     DYSLIPIDEMIA  Chronic issue. Hold statin for now.     Need for prophylactic measures:   -SCDS for DVT prophylaxis

## 2019-03-18 NOTE — DISCHARGE NOTE PROVIDER - PROVIDER TOKENS
PROVIDER:[TOKEN:[[id number]]],PROVIDER:[TOKEN:[[id number]]],PROVIDER:[TOKEN:[[id number]]]

## 2019-03-18 NOTE — PROGRESS NOTE ADULT - SUBJECTIVE AND OBJECTIVE BOX
Patient is a 79y old  Female who presents with a chief complaint of abdominal pain.      INTERVAL HPI/OVERNIGHT EVENTS: Patient seen and examined at bedside. Patient had a fever of 100.8 yesterday evening. Patient still with left sided abdominal pain, described as crampy, comes and goes, worse with palpation, improves with pain medication. Last BM one week ago.        T(C): 36.7 (03-18-19 @ 07:45), Max: 38.2 (03-17-19 @ 23:41)  HR: 74 (03-18-19 @ 07:45) (74 - 82)  BP: 107/65 (03-18-19 @ 07:45) (103/53 - 118/55)  RR: 18 (03-18-19 @ 07:45) (18 - 18)  SpO2: 94% (03-18-19 @ 07:45) (94% - 100%)  Wt(kg): --  I&O's Summary    17 Mar 2019 07:01  -  18 Mar 2019 07:00  --------------------------------------------------------  IN: 1100 mL / OUT: 350 mL / NET: 750 mL    18 Mar 2019 07:01  -  18 Mar 2019 11:49  --------------------------------------------------------  IN: 100 mL / OUT: 0 mL / NET: 100 mL        LABS:                        10.6   13.37 )-----------( 221      ( 18 Mar 2019 06:24 )             32.7     03-18    143  |  110<H>  |  17  ----------------------------<  73  3.3<L>   |  23  |  0.80    Ca    7.5<L>      18 Mar 2019 06:24  Mg     2.1     03-18    TPro  7.4  /  Alb  3.3  /  TBili  0.4  /  DBili  x   /  AST  28  /  ALT  29  /  AlkPhos  64  03-16        CAPILLARY BLOOD GLUCOSE      MEDICATIONS  (STANDING):  lactobacillus acidophilus 1 Tablet(s) Oral three times a day with meals  levothyroxine 25 MICROGram(s) Oral daily  pantoprazole  Injectable 40 milliGRAM(s) IV Push daily  piperacillin/tazobactam IVPB. 3.375 Gram(s) IV Intermittent every 8 hours  sodium chloride 0.9%. 1000 milliLiter(s) (100 mL/Hr) IV Continuous <Continuous>    MEDICATIONS  (PRN):  acetaminophen   Tablet .. 650 milliGRAM(s) Oral every 6 hours PRN Temp greater or equal to 38C (100.4F), Mild Pain (1 - 3)  HYDROmorphone  Injectable 0.5 milliGRAM(s) IV Push every 4 hours PRN Severe Pain (7 - 10)  morphine  - Injectable 2 milliGRAM(s) IV Push every 4 hours PRN Moderate Pain (4 - 6)  ondansetron Injectable 4 milliGRAM(s) IV Push every 6 hours PRN Nausea and/or Vomiting      REVIEW OF SYSTEMS:  CONSTITUTIONAL: No fever, weight loss, or fatigue  ENMT:  No throat pain  RESPIRATORY: No cough, wheezing, chills or hemoptysis; No shortness of breath  CARDIOVASCULAR: No chest pain, palpitations, dizziness, or leg swelling  GASTROINTESTINAL: + abdominal pain and constipation, no nausea, vomiting, or hematemesis; No diarrhea. No melena or hematochezia.  GENITOURINARY: No dysuria, frequency, hematuria, or incontinence  NEUROLOGICAL: No headaches, memory loss, loss of strength, numbness, or tremors    RADIOLOGY & ADDITIONAL TESTS:  < from: CT Abdomen and Pelvis w/ Oral Cont and w/ IV Cont (03.16.19 @ 17:07) >  IMPRESSION:    Persistent abnormality in the region of the sigmoid colon without change   in appearance since the prior study. There are pericolonic inflammatory   changes in this region suggesting diverticulitis. No evidence of   associated abscess. There is now a small amount of ascites in the upper   abdomen adjacent to the liver which is a new finding since the prior   examination..        Imaging Personally Reviewed:  [ ] YES  [ ] NO    Consultant(s) Notes Reviewed:  [ X YES  [ ] NO    PHYSICAL EXAM:  GENERAL: anxious about surgery   HEAD: atraumatic, Normocephalic  EYES:  PERRL, conjunctiva and sclera clear  NERVOUS SYSTEM:  Alert & Oriented, Good concentration  CHEST/LUNG: Clear to percussion bilaterally; No rales, rhonchi, wheezing, or rubs  HEART: Regular rate and rhythm; No murmurs, rubs, or gallops  ABDOMEN: Soft, tender to palpattion LLQ, hypoactive BS   EXTREMITIES:   No clubbing, cyanosis, or edema    Care Discussed with Consultants/Other Providers [ ] YES  [ ] NO

## 2019-03-18 NOTE — CONSULT NOTE ADULT - SUBJECTIVE AND OBJECTIVE BOX
Infectious Diseases Consult by Phoebe Urban MD    Reason for Consult : worsening abdominal pain    HPI:  79 female with PMH of hypothyroidism. HLD diverticulitis presents to ER c/o severe abdominal pain, nausea and vomiting, started two nights ago and worsened yesterday. Patient states she came to ER 3/11/19 for abdominal pain and had ct abdomen/pelvis, told she had diverticulitis, placed on Augmentin and Flagyl. Patient again came to ER 3/15/19 for abdominal pain, had labs and US abdomen done, pain resolved and went home. She had repeat ct abdomen and pelvis showed no significant change in the inflammatory process in sigmoid colon, no abscess was seen. She was admitted due ot severe pain and low grade fever and was placed on Zosyn .    Patient had normal small BM today. Denies chills, constipation or diarrhea. She reports that this her fourth episode of diverticulitis. Last episode was in December 2017.  She had last colonoscopy 10 years ago, had Cologard in January 2019        Past Medical & Surgical Hx:  PAST MEDICAL & SURGICAL HISTORY:  Diverticulosis  Hyperlipidemia  Hypothyroidism  H/O arthroscopic knee surgery      Social History-- , retired, lives with family   EtOH: denies   Tobacco: denies   Drug Use: denies     Travel/Environmental/Occupational History: Non contributory     FAMILY HISTORY:  No pertinent family history in first degree relatives      Allergies    clindamycin (Stomach Upset)    Intolerances        Home/ Out patient  Medications :  Home Medications:  aspirin 81 mg oral tablet: 1 tab(s) orally once a day (16 Mar 2019 13:42)  brimonidine 0.15% ophthalmic solution: 1 drop(s) to  both eyes 3 times a day (18 Mar 2019 10:14)  Calcium 600+D oral tablet: 1 tab(s) orally 2 times a day (16 Mar 2019 13:42)  Lumigan 0.03% ophthalmic solution: 1 drop(s) to right eye only (18 Mar 2019 10:13)  Omega-3 1000 mg oral capsule: 1 cap(s) orally 2 times a day (16 Mar 2019 13:42)  pravastatin 10 mg oral tablet: 1 tab(s) orally once a day (16 Mar 2019 13:42)  Restasis 0.05% ophthalmic emulsion: 1 drop(s) to each affected eye every 12 hours (16 Mar 2019 13:42)  Synthroid 25 mcg (0.025 mg) oral tablet: 1 tab(s) orally once a day (16 Mar 2019 13:42)      Current Inpatient Medications :    ANTIBIOTICS:   piperacillin/tazobactam IVPB. 3.375 Gram(s) IV Intermittent every 8 hours      OTHER RELEVANT MEDICATIONS :  acetaminophen   Tablet .. 650 milliGRAM(s) Oral every 6 hours PRN  HYDROmorphone  Injectable 0.5 milliGRAM(s) IV Push every 4 hours PRN  levothyroxine 25 MICROGram(s) Oral daily  morphine  - Injectable 2 milliGRAM(s) IV Push every 4 hours PRN  ondansetron Injectable 4 milliGRAM(s) IV Push every 6 hours PRN  pantoprazole  Injectable 40 milliGRAM(s) IV Push daily  sodium chloride 0.9%. 1000 milliLiter(s) IV Continuous <Continuous>      ROS:  CONSTITUTIONAL: Positive for  fever or chills, feels well, good appetite  EYES:  Negative  blurry vision or double vision  CARDIOVASCULAR:  Negative for chest pain or palpitations  RESPIRATORY:  Negative for cough, wheezing, or SOB   GASTROINTESTINAL:  Positive for nausea, constipation, and abdominal pain  GENITOURINARY:  Negative frequency, urgency , dysuria or hematuria   NEUROLOGIC:  No headache, confusion, dizziness, lightheadedness  All other systems were reviewed and are negative          I&O's Detail    17 Mar 2019 07:01  -  18 Mar 2019 07:00  --------------------------------------------------------  IN:    sodium chloride 0.9%.: 1100 mL  Total IN: 1100 mL    OUT:    Voided: 350 mL  Total OUT: 350 mL    Total NET: 750 mL      18 Mar 2019 07:01  -  18 Mar 2019 12:04  --------------------------------------------------------  IN:    Oral Fluid: 100 mL  Total IN: 100 mL    OUT:  Total OUT: 0 mL    Total NET: 100 mL          Physical Exam:  Vital Signs Last 24 Hrs  T(C): 36.7 (18 Mar 2019 07:45), Max: 38.2 (17 Mar 2019 23:41)  T(F): 98 (18 Mar 2019 07:45), Max: 100.8 (17 Mar 2019 23:41)  HR: 74 (18 Mar 2019 07:45) (74 - 82)  BP: 107/65 (18 Mar 2019 07:45) (103/53 - 118/55)  BP(mean): --  RR: 18 (18 Mar 2019 07:45) (18 - 18)  SpO2: 94% (18 Mar 2019 07:45) (94% - 100%)      General: well developed well nourished, in no acute distress  Eyes: sclera anicteric, pupils equal and reactive to light  ENMT: buccal mucosa moist, pharynx not injected  Neck: supple, trachea midline  Lungs: clear, no wheeze/rhonchi  Cardiovascular: regular rate and rhythm, S1 S2  Abdomen: soft, mild distended, marked LLQ tenderness , no organomegaly present, bowel sounds normal  Neurological:  alert and oriented x3, Cranial Nerves II-XII grossly intact  Skin:no increased ecchymosis/petechiae/purpura  Lymph Nodes: no palpable cervical/supraclavicular lymph nodes enlargements  Extremities: no cyanosis/clubbing/edema    Labs:                          10.6   13.37  )----------(  221       ( 18 Mar 2019 06:24 )               32.7      143    |  110    |  17     ----------------------------<  73         ( 18 Mar 2019 06:24 )  3.3     |  23     |  0.80     Ca    7.5        ( 18 Mar 2019 06:24 )  Mg     2.1       ( 18 Mar 2019 06:24 )        Sedimentation Rate, Erythrocyte (03.17.19 @ 05:06)    Sedimentation Rate, Erythrocyte: 39 mm/hr    C-Reactive Protein, Serum (03.17.19 @ 11:44)    C-Reactive Protein, Serum: 3.29 mg/dL          RECENT CULTURES:    Culture - Urine (collected 03-15-19 @ 12:31)  Source: .Urine Clean Catch (Midstream)  Final Report (03-16-19 @ 12:02):    No growth    Culture - Urine (collected 03-11-19 @ 22:57)  Source: .Urine Clean Catch (Midstream)  Final Report (03-12-19 @ 17:20):    <10,000 CFU/mL Normal Urogenital Mari            RADIOLOGY & ADDITIONAL STUDIES:  < from: CT Abdomen and Pelvis w/ Oral Cont and w/ IV Cont (03.16.19 @ 17:07) >  FINDINGS:  The lung bases are clear and there is no evidence of a pleural effusion.    There is a small amount of ascites which is a new finding since the prior   examination.    The liver and gallbladder demonstrate no abnormality. The spleen is not   enlarged and demonstrates no focal abnormality. The pancreatic contour is   unremarkable without evidence of mass, inflammation or ductal dilatation.    The adrenal glands demonstrate normal size and contour.    Small cyst in the upper pole of the right kidney. The right kidney is   otherwise unremarkable. The left kidney is unremarkable.    Small hiatus hernia.    No evidence of retroperitoneal or pelvic lymphadenopathy.    The bladder is unremarkable. No evidence of pelvic mass.    There is been no significant change in the appearance of the sigmoid   colon since the prior study. Inflammatory changes are noted in the   pericolonic fat at this site. No evidence of abscess. No evidence of   pneumoperitoneum.    Degenerative changes in the spine.    IMPRESSION:    Persistent abnormality in the region of the sigmoid colon without change   in appearance since the prior study. There are pericolonic inflammatory   changes in this region suggesting diverticulitis. No evidence of   associated abscess. There is now a small amount of ascites in the upper   abdomen adjacent to the liver which is a new finding since the prior   examination..        US Abdomen Complete (03.15.19 @ 09:49) >    FINDINGS:    Liver: Within normal limits.    Bile ducts: Normal caliber. Common bile duct measures 5 mm.     Gallbladder: Within normal limits.        Pancreas: Visualized portions are within normal limits.    Spleen: 9 cm. Within normal limits.    Right kidney: 10 cm. No hydronephrosis. Suggestion of a right-sided   extrarenal pelvis.    Left kidney: 10 cm.  No hydronephrosis.        Ascites: None.    Aorta and IVC: Visualized portions are within normal limits.    IMPRESSION: No gallstones or kidney stones.     CT Abdomen and Pelvis w/ Oral Cont and w/ IV Cont (03.11.19 @ 16:04) >  Impression:    CT findings as discussed which may reflect sigmoid diverticulitis.  Cannot exclude underlying transient mucosal mass or underlying neoplasm.  Recommend further evaluation with colonoscopy if this has not recently   been performed.  This finding was discussed with Dr. Lin at the time of   interpretation on 3/11/2019.        Assessment :   79 year old female hx of diverticulitis , last episode in Dec 2017, admitted with recurrent sigmoid diverticulitis not improved on po antibiotics x 6 days , with worsening abdominal pain and low grade fevers. Unfortunately no blood cs done . Now with increased WBC, low grade fever and worsening pain. Very concerned about having surgery, which is tentatively scheduled for tomorrow .    Repeat ct scan of abdomen did not show any changes in the inflammatory reaction or abscess development .    Plan :   - will change to Meropenem i gram q 8 hours to cover for possible ESBL  GNR infection as she was treated with antibiotics recently in Decembers and last week   - keep NPO   - serial abdominal exams and close monitoring for signs of generalized peritonitis   - most likely will need surgery   - trend cbc    D/W with Dr. Spencer and Dr. Contreras     Continue with present regime .  Appropriate use of antibiotics and adverse effects reviewed.      I have discussed the above plan of care with patient and her  present at the bedside  in detail. They expressed understanding of the treatment plan . Risks, benefits and alternatives discussed in detail. I have asked if they have any questions or concerns and appropriately addressed them to the best of my ability .      > 75 minutes spent in direct patient care reviewing  the notes, lab data/ imaging , discussion with multidisciplinary team. All questions were addressed and answered to the best of my capacity .    Thank you for allowing me to participate in the care of your patient .      Phoebe Urban MD  [phone]

## 2019-03-18 NOTE — DISCHARGE NOTE PROVIDER - CARE PROVIDER_API CALL
Glenn Garza)  Internal Medicine  [address] [place] [address], [address]  [place] [address]  Phone: [phone]  Fax: [phone]  Follow Up Time:     Luis Oswald)  Gastroenterology; Internal Medicine  [address] [place] [address], [address]  [place] [address]  Phone: [phone]  Fax: [phone]  Follow Up Time:     Armando Spencer)  Surgery  [address] [facility] [address], [address]  [place] [address]  Phone: [phone]  Fax: [phone]  Follow Up Time:

## 2019-03-18 NOTE — PROGRESS NOTE ADULT - SUBJECTIVE AND OBJECTIVE BOX
pt seen  still with tenderness,  -n-v  +F-bm  ICU Vital Signs Last 24 Hrs  T(C): 36.7 (18 Mar 2019 07:45), Max: 38.2 (17 Mar 2019 23:41)  T(F): 98 (18 Mar 2019 07:45), Max: 100.8 (17 Mar 2019 23:41)  HR: 74 (18 Mar 2019 07:45) (74 - 82)  BP: 107/65 (18 Mar 2019 07:45) (103/53 - 118/55)  BP(mean): --  ABP: --  ABP(mean): --  RR: 18 (18 Mar 2019 07:45) (18 - 18)  SpO2: 94% (18 Mar 2019 07:45) (94% - 100%)  gen-NAD  resp-clear,  abd-soft, LUQ, LLQ tenderness, +rRLQ tenderess now                          10.6   13.37 )-----------( 221      ( 18 Mar 2019 06:24 )             32.7   03-18    143  |  110<H>  |  17  ----------------------------<  73  3.3<L>   |  23  |  0.80    Ca    7.5<L>      18 Mar 2019 06:24  Mg     2.1     03-18    TPro  7.4  /  Alb  3.3  /  TBili  0.4  /  DBili  x   /  AST  28  /  ALT  29  /  AlkPhos  64  03-16

## 2019-03-18 NOTE — CONSULT NOTE ADULT - SUBJECTIVE AND OBJECTIVE BOX
Chief Complaint:  Patient is a 79y old  Female who presents with a chief complaint of worsening abdominal pain (18 Mar 2019 12:03)      HPI:Lower abd pain for 1 week. 2 CT scans showing diverticulitis but no abscess.  Oain has been progressing, also emesis    Allergies    clindamycin (Stomach Upset)    Intolerances        MEDICATIONS  (STANDING):  atorvastatin 10 milliGRAM(s) Oral at bedtime  cycloSPORINE (RESTASIS) 0.05% Emulsion 1 Drop(s) Both EYES two times a day  lactobacillus acidophilus 1 Tablet(s) Oral three times a day with meals  levothyroxine 25 MICROGram(s) Oral daily  meropenem  IVPB 1000 milliGRAM(s) IV Intermittent every 8 hours  meropenem  IVPB      pantoprazole  Injectable 40 milliGRAM(s) IV Push daily  sodium chloride 0.9%. 1000 milliLiter(s) (100 mL/Hr) IV Continuous <Continuous>  Systane oph sol 1 Drop(s) 1 Drop(s) Both EYES two times a day    MEDICATIONS  (PRN):  acetaminophen   Tablet .. 650 milliGRAM(s) Oral every 6 hours PRN Temp greater or equal to 38C (100.4F), Mild Pain (1 - 3)  artificial  tears Solution 1 Drop(s) Both EYES two times a day PRN Dry Eyes  HYDROmorphone  Injectable 0.5 milliGRAM(s) IV Push every 4 hours PRN Severe Pain (7 - 10)  morphine  - Injectable 2 milliGRAM(s) IV Push every 4 hours PRN Moderate Pain (4 - 6)  ondansetron Injectable 4 milliGRAM(s) IV Push every 6 hours PRN Nausea and/or Vomiting      PAST MEDICAL & SURGICAL HISTORY:  Diverticulosis  Hyperlipidemia  Hypothyroidism  H/O arthroscopic knee surgery      FAMILY HISTORY:  No pertinent family history in first degree relatives      Social History  Tobacco:no  Alcohol:no  Drugs:no    ROS  General:  No wt loss, fevers, chills, night sweats, fatigue,   Eyes:  Good vision, no reported pain  ENT:  No sore throat, pain, runny nose, dysphagia  CV:  No pain, palpitations, hypo/hypertension  Resp:  No dyspnea, cough, tachypnea, wheezing  GI:  lower abd pain, more on the left, nausea,  vomiting, No diarrhea, + constipation, No weight loss, + fever, No pruritis, No rectal bleeding, No tarry stools, No dysphagia,  :  No pain, bleeding, incontinence, nocturia  Muscle:  No pain, weakness  Neuro:  No weakness, tingling, memory problems  Psych:  No fatigue, insomnia, mood problems, depression  Endocrine:  No polyuria, polydipsia, cold/heat intolerance  Heme:  No petechiae, ecchymosis, easy bruisability  Skin:  No rash, tattoos, scars, edema      PHYSICAL EXAM:   Vital Signs Last 24 Hrs  T(C): 37.1 (18 Mar 2019 15:31), Max: 38.2 (17 Mar 2019 23:41)  T(F): 98.8 (18 Mar 2019 15:31), Max: 100.8 (17 Mar 2019 23:41)  HR: 73 (18 Mar 2019 15:31) (73 - 82)  BP: 112/65 (18 Mar 2019 15:31) (103/53 - 118/55)  BP(mean): --  RR: 18 (18 Mar 2019 15:31) (18 - 18)  SpO2: 98% (18 Mar 2019 15:31) (94% - 100%)  GENERAL:  Well developed, well-nourished  HEENT:  NC/AT,  conjunctivae clear and pink, no thyromegaly, nodules, adenopathy, no JVD, sclera -anicteric  CHEST: Lungs clear to P&A  ABDOMEN:  Soft, tender LLQ and supra pubic, early rebound?+distended, decreased but present bowel sounds,  no masses ,no hepato-splenomegaly, no signs of chronic liver disease  EXTEREMITIES:  no cyanosis,clubbing or edema  SKIN:  No rash/erythema/ecchymoses/petechiae/wounds/abscess/warm/dry  NEURO:  Alert, oriented  Height (cm): 160.02 (03-16 @ 13:39), 160.02 (03-15 @ 06:30), 160.02 (03-11 @ 12:45)  Weight (kg): 52.2 (03-16 @ 13:39), 68 (03-15 @ 06:30), 52.2 (03-11 @ 12:45)  BMI (kg/m2): 20.4 (03-16 @ 13:39), 26.6 (03-15 @ 06:30), 20.4 (03-11 @ 12:45)  BSA (m2): 1.53 (03-16 @ 13:39), 1.71 (03-15 @ 06:30), 1.53 (03-11 @ 12:45)    LABS:                        10.6   13.37 )-----------( 221      ( 18 Mar 2019 06:24 )             32.7     03-18    143  |  110<H>  |  17  ----------------------------<  73  3.3<L>   |  23  |  0.80    Ca    7.5<L>      18 Mar 2019 06:24  Mg     2.1     03-18                 Imaging:CT: sigmoid diverticulitis, no abscess

## 2019-03-18 NOTE — DISCHARGE NOTE PROVIDER - NSDCFUADDINST_GEN_ALL_CORE_FT
You will be going to [facility].    Please call and make appointments to follow-up with your GI doctor, PCP, and surgeon.

## 2019-03-18 NOTE — DISCHARGE NOTE PROVIDER - NSDCCPCAREPLAN_GEN_ALL_CORE_FT
PRINCIPAL DISCHARGE DIAGNOSIS  Diagnosis: Diverticulitis  Assessment and Plan of Treatment: -During your hospital stay you were treated with IV antibitotics.   -A surgery, exploratory laparotmy was preformed   Diet:   -Please f/u with your GI doctor (Dr. Oswald) within one week of discharge   -Please follow-up with your surgeon (Dr. Spencer) within one week of discharge   -Please f/u with your PCP within 1 week of discharge      SECONDARY DISCHARGE DIAGNOSES  Diagnosis: Chronically dry eyes  Assessment and Plan of Treatment: Continue with your home medications, systane and restasis.    Diagnosis: HLD (hyperlipidemia)  Assessment and Plan of Treatment: -Continue with your home medication pravastatin.    Diagnosis: Hypothyroidism  Assessment and Plan of Treatment: Continue with your home medication synthroid. PRINCIPAL DISCHARGE DIAGNOSIS  Diagnosis: Status post Melania procedure  Assessment and Plan of Treatment: -During your hospital stay you were treated with IV antibitotics.   -A surgery, exploratory laparotmy was preformed and a shankar procedure was preformed   -Colostomy bag:   Diet:   -Please f/u with your GI doctor (Dr. Oswald) within one week of discharge   -Please follow-up with your surgeon (Dr. Spencer) within one week of discharge   -Please f/u with your PCP within 1 week of discharge      SECONDARY DISCHARGE DIAGNOSES  Diagnosis: Hypothyroidism  Assessment and Plan of Treatment: Continue with your home medication synthroid.    Diagnosis: HLD (hyperlipidemia)  Assessment and Plan of Treatment: -Continue with your home medication pravastatin.    Diagnosis: Chronically dry eyes  Assessment and Plan of Treatment: Continue with your home medications, systane and restasis. PRINCIPAL DISCHARGE DIAGNOSIS  Diagnosis: Status post Melania procedure  Assessment and Plan of Treatment: -During your hospital stay you were treated with IV antibitotics.   -A surgery, exploratory laparotmy was preformed and a shankar procedure was preformed   -Colostomy bag:   Diet: '  -Pain:   -Please f/u with your GI doctor (Dr. Oswald) within one week of discharge   -Please follow-up with your surgeon (Dr. Spencer) within one week of discharge   -Please f/u with your PCP within 1 week of discharge      SECONDARY DISCHARGE DIAGNOSES  Diagnosis: Hypothyroidism  Assessment and Plan of Treatment: Continue with your home medication synthroid.    Diagnosis: HLD (hyperlipidemia)  Assessment and Plan of Treatment: -Continue with your home medication pravastatin.    Diagnosis: Chronically dry eyes  Assessment and Plan of Treatment: Continue with your home medications, systane and restasis. PRINCIPAL DISCHARGE DIAGNOSIS  Diagnosis: Status post Melania procedure  Assessment and Plan of Treatment: -During your hospital stay you were treated with IV antibitotics.   -A surgery, exploratory laparotmy was preformed and a shankar procedure was preformed   - You recieved colostomy bag education   -You will be ging to [facility] to regain strength    -Pain medication will be continued at [facility] as needed   -Please f/u with your GI doctor (Dr. Oswald) within one week of discharge from [facility]   -Please follow-up with your surgeon (Dr. Spencer) within one week of discharge from [facility]   -Please f/u with your PCP within 1 week of discharge from [facility]      SECONDARY DISCHARGE DIAGNOSES  Diagnosis: Hypothyroidism  Assessment and Plan of Treatment: Continue with your home medication synthroid.    Diagnosis: HLD (hyperlipidemia)  Assessment and Plan of Treatment: -Continue with your home medication pravastatin.    Diagnosis: Chronically dry eyes  Assessment and Plan of Treatment: Continue with your home medications, systane and restasis.

## 2019-03-18 NOTE — DISCHARGE NOTE PROVIDER - HOSPITAL COURSE
79 female with PMH of hypothyroidism HLD diverticulitis who presented to the ER  with worsening abdominal pain, nausea and vomiting, admitted for diverticulitis. Of note patient previously seen in the ED earlier during the week, and treated with outpatient antibiotics. CT abdomen showed: Persistent abnormality in the region of the sigmoid colon without change in appearance since the prior study, with pericolonic inflammatory changes in this region suggesting diverticulitis. No evidence of     associated abscess. Small amount of ascites in the upper abdomen adjacent to the liver.  Patient was started on broad spectrum antibiotic Zosyn, which was later changed to meropenem by Infectious Disease consult (Dr. WINIFRED Urban), who followed patient throughout stay.  Surgery Consult (Dr. Spencer), followed patient throughout stay and ______________ GI consult Dr Garza __________________ 79 female with PMH of hypothyroidism HLD diverticulitis who presented to the ER  with worsening abdominal pain, nausea and vomiting, admitted for diverticulitis. Of note patient previously seen in the ED earlier during the week, and treated with outpatient antibiotics. CT abdomen showed: Persistent abnormality in the region of the sigmoid colon without change in appearance since the prior study, with pericolonic inflammatory changes in this region suggesting diverticulitis. No evidence of     associated abscess. Small amount of ascites in the upper abdomen adjacent to the liver.  Patient was started on broad spectrum antibiotic Zosyn, which was later changed to meropenem by Infectious Disease consult (Dr. WINIFRED Urban), who followed patient throughout stay. GI consult Dr Garza followed patient throughout stay.   Surgery Consult (Dr. Spencer), followed patient throughout stay and preformed an exploratory laparotomy which showed ______________. 79 female with PMH of hypothyroidism HLD diverticulitis who presented to the ER  with worsening abdominal pain, nausea and vomiting, admitted for diverticulitis. Of note patient previously seen in the ED earlier during the week, and treated with outpatient antibiotics. CT abdomen showed: Persistent abnormality in the region of the sigmoid colon without change in appearance since the prior study, with pericolonic inflammatory changes in this region suggesting diverticulitis. No evidence of     associated abscess. Small amount of ascites in the upper abdomen adjacent to the liver.  Patient was started on broad spectrum antibiotic Zosyn, which was later changed to meropenem by Infectious Disease consult (Dr. WINIFRED Ubran), who followed patient throughout stay. GI consult Dr Garza followed patient. Surgery Consult (Dr. Spencer), followed patient throughout stay and preformed an exploratory laparotomy which showed and patient underwent Florez procedure. After procedure patient was continued on pain medications and diet was advanced as tolerated to regular diet on discharge.  Antibiotics were changed from meropenem to Invanz, and continued till 3/26 as per infectious disease consult. Patient received Ostomy education.         On day of discharge, patient stable for d/c home with close follow-up with surgeon, GI and PCP. 79 female with PMH of hypothyroidism HLD diverticulitis who presented to the ER  with worsening abdominal pain, nausea and vomiting, admitted for diverticulitis. Of note patient previously seen in the ED earlier during the week, and treated with outpatient antibiotics. CT abdomen showed: Persistent abnormality in the region of the sigmoid colon without change in appearance since the prior study, with pericolonic inflammatory changes in this region suggesting diverticulitis. No evidence of     associated abscess. Small amount of ascites in the upper abdomen adjacent to the liver.  Patient was started on broad spectrum antibiotic Zosyn, which was later changed to meropenem by Infectious Disease consult (Dr. WINIFRED Urban), who followed patient throughout stay. GI consult Dr Garza followed patient. Surgery Consult (Dr. Spencer), followed patient throughout stay and preformed an exploratory laparotomy which showed and patient underwent Florez procedure. After procedure patient was continued on pain medications and diet was advanced as tolerated to regular diet on discharge.  Antibiotics were changed from meropenem to Invanz, and continued till 3/ as per infectious disease consult. Patient received Ostomy education.         On day of discharge, patient stable for d/c home with close follow-up with surgeon, GI and PCP. 79 female with PMH of hypothyroidism HLD diverticulitis who presented to the ER  with worsening abdominal pain, nausea and vomiting, admitted for diverticulitis. Of note patient previously seen in the ED earlier during the week, and treated with outpatient antibiotics. CT abdomen showed: Persistent abnormality in the region of the sigmoid colon without change in appearance since the prior study, with pericolonic inflammatory changes in this region suggesting diverticulitis. No evidence of     associated abscess. Small amount of ascites in the upper abdomen adjacent to the liver.  Patient was started on broad spectrum antibiotic Zosyn, which was later changed to meropenem by Infectious Disease consult (Dr. WINIFRED Urban), who followed patient throughout stay. GI consult Dr Garza followed patient. Surgery Consult (Dr. Spencer), followed patient throughout stay and preformed an exploratory laparotomy which showed and patient underwent Florez procedure. After procedure patient was continued on pain medications and diet was advanced as tolerated to regular diet on discharge.  Antibiotics were changed from meropenem to Invanz,  as per infectious disease consult, and patient completed course prior to discharge. Patient received Ostomy education. Patient was seen by PT and PT recommended home with home PT.          On day of discharge, patient stable for d/c home with home PT with close follow-up with surgeon, GI and PCP. 79 female with PMH of hypothyroidism HLD diverticulitis who presented to the ER  with worsening abdominal pain, nausea and vomiting, admitted for diverticulitis. Of note patient previously seen in the ED earlier during the week, and treated with outpatient antibiotics. CT abdomen showed: Persistent abnormality in the region of the sigmoid colon without change in appearance since the prior study, with pericolonic inflammatory changes in this region suggesting diverticulitis. No evidence of     associated abscess. Small amount of ascites in the upper abdomen adjacent to the liver.  Patient was started on broad spectrum antibiotic Zosyn, which was later changed to meropenem by Infectious Disease consult (Dr. WINIFRED Urban), who followed patient throughout stay. GI consult Dr Garza followed patient. Surgery Consult (Dr. Spencer), followed patient throughout stay and preformed an exploratory laparotomy which showed and patient underwent Florez procedure. After procedure patient was continued on pain medications and diet was advanced as tolerated to regular diet on discharge.  Antibiotics were changed from meropenem to Invanz,  as per infectious disease consult, and patient completed course prior to discharge. Patient received Ostomy education. Patient was seen by PT and PT recommended rehab.          On day of discharge, patient stable for d/c to [facility] with close follow-up with surgeon, GI and PCP after discharge from [facility].            ICU Vital Signs Last 24 Hrs    T(C): 36.8 (28 Mar 2019 07:41), Max: 36.9 (27 Mar 2019 15:31)    T(F): 98.2 (28 Mar 2019 07:41), Max: 98.5 (27 Mar 2019 15:31)    HR: 76 (28 Mar 2019 07:41) (68 - 76)    BP: 96/58 (28 Mar 2019 07:41) (96/58 - 145/74)    BP(mean): --    ABP: --    ABP(mean): --    RR: 16 (28 Mar 2019 07:41) (16 - 16)    SpO2: 97% (28 Mar 2019 07:41) (96% - 97%)        PHYSICAL EXAM:    GENERAL: NAD, well-groomed, well-developed    HEAD:  Atraumatic, Normocephalic    EYES: EOMI, PERRL, conjunctiva and sclera clear    NERVOUS SYSTEM:  Alert & Oriented, Good concentration; nonfocal    CHEST/LUNG: clear to auscultation bilaterally, mildly diminished bibasilar breath sounds    HEART: Regular rate and rhythm; No murmurs, rubs, or gallops    ABDOMEN: Soft, surgical site clean, dry, stool in colostomy bag 79 female with PMH of hypothyroidism HLD diverticulitis who presented to the ER  with worsening abdominal pain, nausea and vomiting, admitted for diverticulitis. Of note patient previously seen in the ED earlier during the week, and treated with outpatient antibiotics. CT abdomen showed: Persistent abnormality in the region of the sigmoid colon without change in appearance since the prior study, with pericolonic inflammatory changes in this region suggesting diverticulitis. No evidence of     associated abscess. Small amount of ascites in the upper abdomen adjacent to the liver.  Patient was started on broad spectrum antibiotic Zosyn, which was later changed to meropenem by Infectious Disease consult (Dr. WINIFRED Urban), who followed patient throughout stay. GI consult Dr Garza followed patient. Surgery Consult (Dr. Spencer), followed patient throughout stay and preformed an exploratory laparotomy which showed and patient underwent Florez procedure. After procedure patient was continued on pain medications and diet was advanced as tolerated to regular diet on discharge.  Antibiotics were changed from meropenem to Invanz,  as per infectious disease consult, and patient completed course prior to discharge. Patient received Ostomy education. Patient was seen by PT and PT recommended rehab.          On day of discharge, patient stable for d/c to [facility] with close follow-up with surgeon, GI and PCP after discharge from [facility].            ICU Vital Signs Last 24 Hrs    T(C): 37.1 (29 Mar 2019 08:10), Max: 37.1 (29 Mar 2019 08:10)    T(F): 98.7 (29 Mar 2019 08:10), Max: 98.7 (29 Mar 2019 08:10)    HR: 77 (29 Mar 2019 08:10) (75 - 77)    BP: 102/55 (29 Mar 2019 08:10) (102/55 - 108/68)    BP(mean): --    ABP: --    ABP(mean): --    RR: 16 (29 Mar 2019 08:10) (16 - 16)    SpO2: 96% (29 Mar 2019 08:10) (96% - 96%)            PHYSICAL EXAM:    GENERAL: NAD, well-groomed, well-developed    HEAD:  Atraumatic, Normocephalic    EYES: EOMI, PERRL, conjunctiva and sclera clear    NERVOUS SYSTEM:  Alert & Oriented, Good concentration; nonfocal    CHEST/LUNG: clear to auscultation bilaterally, mildly diminished bibasilar breath sounds    HEART: Regular rate and rhythm; No murmurs, rubs, or gallops    ABDOMEN: Soft, surgical site clean, dry, stool in colostomy bag

## 2019-03-18 NOTE — PROGRESS NOTE ADULT - ASSESSMENT
78 yo with diverticulitis, failed outpt PO management, now receiving IV abx with increasing WBC, worsening pain       -tentatively scheduled for exploratory laparotomy tomorrow if continues to deteriorate

## 2019-03-19 LAB
ANION GAP SERPL CALC-SCNC: 12 MMOL/L — SIGNIFICANT CHANGE UP (ref 5–17)
BASOPHILS # BLD AUTO: 0.02 K/UL — SIGNIFICANT CHANGE UP (ref 0–0.2)
BASOPHILS NFR BLD AUTO: 0.1 % — SIGNIFICANT CHANGE UP (ref 0–2)
BUN SERPL-MCNC: 16 MG/DL — SIGNIFICANT CHANGE UP (ref 7–23)
CALCIUM SERPL-MCNC: 7.4 MG/DL — LOW (ref 8.5–10.1)
CHLORIDE SERPL-SCNC: 115 MMOL/L — HIGH (ref 96–108)
CO2 SERPL-SCNC: 19 MMOL/L — LOW (ref 22–31)
CREAT SERPL-MCNC: 0.63 MG/DL — SIGNIFICANT CHANGE UP (ref 0.5–1.3)
EOSINOPHIL # BLD AUTO: 0.07 K/UL — SIGNIFICANT CHANGE UP (ref 0–0.5)
EOSINOPHIL NFR BLD AUTO: 0.5 % — SIGNIFICANT CHANGE UP (ref 0–6)
GLUCOSE SERPL-MCNC: 65 MG/DL — LOW (ref 70–99)
HCT VFR BLD CALC: 29.1 % — LOW (ref 34.5–45)
HGB BLD-MCNC: 9.7 G/DL — LOW (ref 11.5–15.5)
IMM GRANULOCYTES NFR BLD AUTO: 0.8 % — SIGNIFICANT CHANGE UP (ref 0–1.5)
LYMPHOCYTES # BLD AUTO: 1 K/UL — SIGNIFICANT CHANGE UP (ref 1–3.3)
LYMPHOCYTES # BLD AUTO: 7.4 % — LOW (ref 13–44)
MCHC RBC-ENTMCNC: 32 PG — SIGNIFICANT CHANGE UP (ref 27–34)
MCHC RBC-ENTMCNC: 33.3 GM/DL — SIGNIFICANT CHANGE UP (ref 32–36)
MCV RBC AUTO: 96 FL — SIGNIFICANT CHANGE UP (ref 80–100)
MONOCYTES # BLD AUTO: 0.75 K/UL — SIGNIFICANT CHANGE UP (ref 0–0.9)
MONOCYTES NFR BLD AUTO: 5.6 % — SIGNIFICANT CHANGE UP (ref 2–14)
NEUTROPHILS # BLD AUTO: 11.52 K/UL — HIGH (ref 1.8–7.4)
NEUTROPHILS NFR BLD AUTO: 85.6 % — HIGH (ref 43–77)
NRBC # BLD: 0 /100 WBCS — SIGNIFICANT CHANGE UP (ref 0–0)
PLATELET # BLD AUTO: 242 K/UL — SIGNIFICANT CHANGE UP (ref 150–400)
POTASSIUM SERPL-MCNC: 3.9 MMOL/L — SIGNIFICANT CHANGE UP (ref 3.5–5.3)
POTASSIUM SERPL-SCNC: 3.9 MMOL/L — SIGNIFICANT CHANGE UP (ref 3.5–5.3)
RBC # BLD: 3.03 M/UL — LOW (ref 3.8–5.2)
RBC # FLD: 13.2 % — SIGNIFICANT CHANGE UP (ref 10.3–14.5)
SODIUM SERPL-SCNC: 146 MMOL/L — HIGH (ref 135–145)
WBC # BLD: 13.47 K/UL — HIGH (ref 3.8–10.5)
WBC # FLD AUTO: 13.47 K/UL — HIGH (ref 3.8–10.5)

## 2019-03-19 PROCEDURE — 88307 TISSUE EXAM BY PATHOLOGIST: CPT | Mod: 26

## 2019-03-19 RX ORDER — LEVOTHYROXINE SODIUM 125 MCG
25 TABLET ORAL DAILY
Qty: 0 | Refills: 0 | Status: DISCONTINUED | OUTPATIENT
Start: 2019-03-19 | End: 2019-03-29

## 2019-03-19 RX ORDER — MEROPENEM 1 G/30ML
1000 INJECTION INTRAVENOUS EVERY 8 HOURS
Qty: 0 | Refills: 0 | Status: COMPLETED | OUTPATIENT
Start: 2019-03-19 | End: 2019-03-22

## 2019-03-19 RX ORDER — SODIUM CHLORIDE 9 MG/ML
1000 INJECTION, SOLUTION INTRAVENOUS
Qty: 0 | Refills: 0 | Status: DISCONTINUED | OUTPATIENT
Start: 2019-03-19 | End: 2019-03-19

## 2019-03-19 RX ORDER — ACETAMINOPHEN 500 MG
1000 TABLET ORAL ONCE
Qty: 0 | Refills: 0 | Status: COMPLETED | OUTPATIENT
Start: 2019-03-20 | End: 2019-03-20

## 2019-03-19 RX ORDER — HYDROMORPHONE HYDROCHLORIDE 2 MG/ML
0.5 INJECTION INTRAMUSCULAR; INTRAVENOUS; SUBCUTANEOUS
Qty: 0 | Refills: 0 | Status: DISCONTINUED | OUTPATIENT
Start: 2019-03-19 | End: 2019-03-19

## 2019-03-19 RX ORDER — OXYCODONE HYDROCHLORIDE 5 MG/1
10 TABLET ORAL EVERY 4 HOURS
Qty: 0 | Refills: 0 | Status: DISCONTINUED | OUTPATIENT
Start: 2019-03-19 | End: 2019-03-21

## 2019-03-19 RX ORDER — ATORVASTATIN CALCIUM 80 MG/1
10 TABLET, FILM COATED ORAL AT BEDTIME
Qty: 0 | Refills: 0 | Status: DISCONTINUED | OUTPATIENT
Start: 2019-03-19 | End: 2019-03-29

## 2019-03-19 RX ORDER — MEROPENEM 1 G/30ML
INJECTION INTRAVENOUS
Qty: 0 | Refills: 0 | Status: DISCONTINUED | OUTPATIENT
Start: 2019-03-19 | End: 2019-03-19

## 2019-03-19 RX ORDER — ENOXAPARIN SODIUM 100 MG/ML
40 INJECTION SUBCUTANEOUS EVERY 24 HOURS
Qty: 0 | Refills: 0 | Status: DISCONTINUED | OUTPATIENT
Start: 2019-03-20 | End: 2019-03-29

## 2019-03-19 RX ORDER — PANTOPRAZOLE SODIUM 20 MG/1
40 TABLET, DELAYED RELEASE ORAL DAILY
Qty: 0 | Refills: 0 | Status: DISCONTINUED | OUTPATIENT
Start: 2019-03-19 | End: 2019-03-27

## 2019-03-19 RX ORDER — ACETAMINOPHEN 500 MG
1000 TABLET ORAL ONCE
Qty: 0 | Refills: 0 | Status: COMPLETED | OUTPATIENT
Start: 2019-03-19 | End: 2019-03-19

## 2019-03-19 RX ORDER — MORPHINE SULFATE 50 MG/1
4 CAPSULE, EXTENDED RELEASE ORAL EVERY 4 HOURS
Qty: 0 | Refills: 0 | Status: DISCONTINUED | OUTPATIENT
Start: 2019-03-19 | End: 2019-03-20

## 2019-03-19 RX ORDER — ONDANSETRON 8 MG/1
4 TABLET, FILM COATED ORAL EVERY 6 HOURS
Qty: 0 | Refills: 0 | Status: DISCONTINUED | OUTPATIENT
Start: 2019-03-19 | End: 2019-03-29

## 2019-03-19 RX ORDER — CYCLOSPORINE 0.5 MG/ML
1 EMULSION OPHTHALMIC
Qty: 0 | Refills: 0 | Status: DISCONTINUED | OUTPATIENT
Start: 2019-03-19 | End: 2019-03-19

## 2019-03-19 RX ORDER — ONDANSETRON 8 MG/1
4 TABLET, FILM COATED ORAL ONCE
Qty: 0 | Refills: 0 | Status: DISCONTINUED | OUTPATIENT
Start: 2019-03-19 | End: 2019-03-19

## 2019-03-19 RX ORDER — MEROPENEM 1 G/30ML
1000 INJECTION INTRAVENOUS ONCE
Qty: 0 | Refills: 0 | Status: DISCONTINUED | OUTPATIENT
Start: 2019-03-19 | End: 2019-03-19

## 2019-03-19 RX ORDER — OXYCODONE HYDROCHLORIDE 5 MG/1
5 TABLET ORAL EVERY 4 HOURS
Qty: 0 | Refills: 0 | Status: DISCONTINUED | OUTPATIENT
Start: 2019-03-19 | End: 2019-03-26

## 2019-03-19 RX ORDER — LACTOBACILLUS ACIDOPHILUS 100MM CELL
1 CAPSULE ORAL
Qty: 0 | Refills: 0 | Status: DISCONTINUED | OUTPATIENT
Start: 2019-03-19 | End: 2019-03-29

## 2019-03-19 RX ORDER — SODIUM CHLORIDE 9 MG/ML
1000 INJECTION, SOLUTION INTRAVENOUS
Qty: 0 | Refills: 0 | Status: DISCONTINUED | OUTPATIENT
Start: 2019-03-19 | End: 2019-03-21

## 2019-03-19 RX ADMIN — HYDROMORPHONE HYDROCHLORIDE 0.5 MILLIGRAM(S): 2 INJECTION INTRAMUSCULAR; INTRAVENOUS; SUBCUTANEOUS at 00:40

## 2019-03-19 RX ADMIN — PANTOPRAZOLE SODIUM 40 MILLIGRAM(S): 20 TABLET, DELAYED RELEASE ORAL at 13:24

## 2019-03-19 RX ADMIN — MEROPENEM 100 MILLIGRAM(S): 1 INJECTION INTRAVENOUS at 06:04

## 2019-03-19 RX ADMIN — SODIUM CHLORIDE 100 MILLILITER(S): 9 INJECTION INTRAMUSCULAR; INTRAVENOUS; SUBCUTANEOUS at 02:19

## 2019-03-19 RX ADMIN — HYDROMORPHONE HYDROCHLORIDE 0.5 MILLIGRAM(S): 2 INJECTION INTRAMUSCULAR; INTRAVENOUS; SUBCUTANEOUS at 06:30

## 2019-03-19 RX ADMIN — ATORVASTATIN CALCIUM 10 MILLIGRAM(S): 80 TABLET, FILM COATED ORAL at 22:19

## 2019-03-19 RX ADMIN — HYDROMORPHONE HYDROCHLORIDE 0.5 MILLIGRAM(S): 2 INJECTION INTRAMUSCULAR; INTRAVENOUS; SUBCUTANEOUS at 01:10

## 2019-03-19 RX ADMIN — HYDROMORPHONE HYDROCHLORIDE 0.5 MILLIGRAM(S): 2 INJECTION INTRAMUSCULAR; INTRAVENOUS; SUBCUTANEOUS at 16:58

## 2019-03-19 RX ADMIN — MEROPENEM 100 MILLIGRAM(S): 1 INJECTION INTRAVENOUS at 22:19

## 2019-03-19 RX ADMIN — HYDROMORPHONE HYDROCHLORIDE 0.5 MILLIGRAM(S): 2 INJECTION INTRAMUSCULAR; INTRAVENOUS; SUBCUTANEOUS at 17:44

## 2019-03-19 RX ADMIN — SODIUM CHLORIDE 75 MILLILITER(S): 9 INJECTION, SOLUTION INTRAVENOUS at 17:28

## 2019-03-19 RX ADMIN — HYDROMORPHONE HYDROCHLORIDE 0.5 MILLIGRAM(S): 2 INJECTION INTRAMUSCULAR; INTRAVENOUS; SUBCUTANEOUS at 17:29

## 2019-03-19 RX ADMIN — MEROPENEM 100 MILLIGRAM(S): 1 INJECTION INTRAVENOUS at 13:26

## 2019-03-19 RX ADMIN — MORPHINE SULFATE 2 MILLIGRAM(S): 50 CAPSULE, EXTENDED RELEASE ORAL at 11:25

## 2019-03-19 RX ADMIN — HYDROMORPHONE HYDROCHLORIDE 0.5 MILLIGRAM(S): 2 INJECTION INTRAMUSCULAR; INTRAVENOUS; SUBCUTANEOUS at 05:59

## 2019-03-19 RX ADMIN — SODIUM CHLORIDE 100 MILLILITER(S): 9 INJECTION, SOLUTION INTRAVENOUS at 21:00

## 2019-03-19 RX ADMIN — MORPHINE SULFATE 2 MILLIGRAM(S): 50 CAPSULE, EXTENDED RELEASE ORAL at 11:11

## 2019-03-19 RX ADMIN — Medication 400 MILLIGRAM(S): at 16:55

## 2019-03-19 RX ADMIN — SODIUM CHLORIDE 100 MILLILITER(S): 9 INJECTION, SOLUTION INTRAVENOUS at 09:38

## 2019-03-19 RX ADMIN — Medication 25 MICROGRAM(S): at 06:07

## 2019-03-19 NOTE — DIETITIAN INITIAL EVALUATION ADULT. - OTHER INFO
Pt A+Ox4. Dx sigmoid diverticulitis. NPO x 4 days. On IVF-D5NS@100cc/hr. No report N/V. +constipation. Last BM 3/12. +abdominal pain. Per RN, plan for exp lap w/ possible colon resection today. Pt A+Ox4. Dx sigmoid diverticulitis. NPO x 4 days. On IVF-D5NS@100cc/hr. No report N/V. +constipation. Last BM 3/12. +abdominal pain. Per RN, plan for exp lap w/ possible colon resection today. Pta decreased appetite/po intake; no significant weight changes reported. Follows heart healthy, high fiber diet pta; enjoys fruits/vegetables/whole grains. Exercises regularly.

## 2019-03-19 NOTE — PRE-OP CHECKLIST - SELECT TESTS ORDERED
CBC/CXR/BMP/EKG/CMP/Urinalysis CBC/ct abd /pelvis/POCT Blood Glucose/BMP/CMP/Urinalysis/Type and Screen/EKG/CXR

## 2019-03-19 NOTE — PROGRESS NOTE ADULT - SUBJECTIVE AND OBJECTIVE BOX
Patient is a 79y old  Female who presents with a chief complaint of worsening abdominal pain (18 Mar 2019 12:03)      INTERVAL HPI/OVERNIGHT EVENTS: Patient seen and examined at bedside.  Patient still with left sided abdominal pain. Yesterday repeat CBC showed increased in WBC, this AM now trending downward. Last BM one week ago.     T(C): 37.1 (03-19-19 @ 08:04), Max: 37.1 (03-18-19 @ 15:31)  HR: 78 (03-19-19 @ 08:04) (71 - 78)  BP: 115/70 (03-19-19 @ 08:04) (102/62 - 115/70)  RR: 17 (03-19-19 @ 08:04) (17 - 18)  SpO2: 96% (03-19-19 @ 08:04) (95% - 98%)  Wt(kg): --  I&O's Summary    18 Mar 2019 07:01  -  19 Mar 2019 07:00  --------------------------------------------------------  IN: 3651 mL / OUT: 1450 mL / NET: 2201 mL        LABS:                        9.7    13.47 )-----------( 242      ( 19 Mar 2019 07:37 )             29.1     03-19    146<H>  |  115<H>  |  16  ----------------------------<  65<L>  3.9   |  19<L>  |  0.63    Ca    7.4<L>      19 Mar 2019 07:37  Mg     2.1     03-18    MEDICATIONS  (STANDING):  atorvastatin 10 milliGRAM(s) Oral at bedtime  cycloSPORINE (RESTASIS) 0.05% Emulsion 1 Drop(s) Both EYES two times a day  dextrose 5% + sodium chloride 0.9%. 1000 milliLiter(s) (100 mL/Hr) IV Continuous <Continuous>  lactobacillus acidophilus 1 Tablet(s) Oral three times a day with meals  levothyroxine 25 MICROGram(s) Oral daily  meropenem  IVPB 1000 milliGRAM(s) IV Intermittent every 8 hours  meropenem  IVPB      pantoprazole  Injectable 40 milliGRAM(s) IV Push daily  Systane oph sol 1 Drop(s) 1 Drop(s) Both EYES two times a day    MEDICATIONS  (PRN):  acetaminophen   Tablet .. 650 milliGRAM(s) Oral every 6 hours PRN Temp greater or equal to 38C (100.4F), Mild Pain (1 - 3)  artificial  tears Solution 1 Drop(s) Both EYES two times a day PRN Dry Eyes  HYDROmorphone  Injectable 0.5 milliGRAM(s) IV Push every 4 hours PRN Severe Pain (7 - 10)  morphine  - Injectable 2 milliGRAM(s) IV Push every 4 hours PRN Moderate Pain (4 - 6)  ondansetron Injectable 4 milliGRAM(s) IV Push every 6 hours PRN Nausea and/or Vomiting      REVIEW OF SYSTEMS:  CONSTITUTIONAL: No fever, weight loss, or fatigue  ENMT:  No throat pain  RESPIRATORY: No cough, wheezing, chills or hemoptysis; No shortness of breath  CARDIOVASCULAR: No chest pain, palpitations, dizziness, or leg swelling  GASTROINTESTINAL: + abdominal pain and constipation, no nausea, vomiting, or hematemesis; No diarrhea. No melena or hematochezia.  GENITOURINARY: No dysuria, frequency, hematuria, or incontinence  NEUROLOGICAL: No headaches, memory loss, loss of strength, numbness, or tremors    RADIOLOGY & ADDITIONAL TESTS: NONE     Imaging Personally Reviewed:  [ ] YES  [ ] NO    Consultant(s) Notes Reviewed:  [ X] YES  [ ] NO    PHYSICAL EXAM:  GENERAL: anxious about surgery   HEAD: atraumatic, Normocephalic  EYES:  PERRL, conjunctiva and sclera clear  NERVOUS SYSTEM:  Alert & Oriented, Good concentration  CHEST/LUNG: Clear to percussion bilaterally; No rales, rhonchi, wheezing, or rubs  HEART: Regular rate and rhythm; No murmurs, rubs, or gallops  ABDOMEN: Soft, tender to palpation LLQ, hypoactive BS   EXTREMITIES: No clubbing, cyanosis, or edema    Care Discussed with Consultants/Other Providers [ ] YES  [ ] NO

## 2019-03-19 NOTE — PROGRESS NOTE ADULT - SUBJECTIVE AND OBJECTIVE BOX
ID progress note    Name: MARIO HARRY  Age: 79y  Gender: Female  MRN: [id number]    Interval History-- Events noted, still with severe left sided abdominal pain inspite of pain medications . Scheduled for surgery this afternoon . GI follow up noted, No BM . Denies any chills or fever   Notes reviewed    Past Medical History--  Diverticulosis  Hyperlipidemia  Hypothyroidism  H/O arthroscopic knee surgery  No significant past surgical history      For details regarding the patient's social history, family history, and other miscellaneous elements, please refer the initial infectious diseases consultation and/or the admitting history and physical examination for this admission.    Allergies--  Allergies    clindamycin (Stomach Upset)    Intolerances        Medications--  Antibiotics:  meropenem  IVPB 1000 milliGRAM(s) IV Intermittent every 8 hours  meropenem  IVPB        Immunologic:    Other:  acetaminophen   Tablet .. PRN  artificial  tears Solution PRN  atorvastatin  cycloSPORINE (RESTASIS) 0.05% Emulsion  dextrose 5% + sodium chloride 0.9%.  HYDROmorphone  Injectable PRN  lactobacillus acidophilus  levothyroxine  morphine  - Injectable PRN  ondansetron Injectable PRN  pantoprazole  Injectable  Systane oph sol 1 Drop(s)      Review of Systems--  A 10-point review of systems was obtained.     Pertinent positives and negatives--  Constitutional: No fevers. No Chills. No Rigors.   Cardiovascular: No chest pain. No palpitations.  Respiratory: No shortness of breath. No cough.  Gastrointestinal: Pos for abdominal pain and  nausea NO vomiting. No diarrhea or constipation.   Psychiatric: Pleasant. Appropriate affect.    Review of systems otherwise negative except as previously noted.    Physical Examination--  Vital Signs: T(F): 98.8 (03-19-19 @ 08:04), Max: 98.8 (03-18-19 @ 15:31)  HR: 78 (03-19-19 @ 08:04)  BP: 115/70 (03-19-19 @ 08:04)  RR: 17 (03-19-19 @ 08:04)  SpO2: 96% (03-19-19 @ 08:04)  Wt(kg): --  General: Nontoxic-appearing Female in no acute distress.  HEENT: AT/NC. PERRL. EOMI. Anicteric. Conjunctiva pink and moist. Oropharynx clear. Dentition fair.  Neck: Not rigid. No sense of mass.  Nodes: None palpable.  Lungs: Clear bilaterally without rales, wheezing or rhonchi  Heart: Regular rate and rhythm. No Murmur. No rub. No gallop. No palpable thrill.  Abdomen: Bowel sounds present and hypoactive. Soft. milddistended. marked Left sided tenderness. No sense of mass. No organomegaly.  Back: No spinal tenderness. No costovertebral angle tenderness.   Extremities: No cyanosis or clubbing. No edema.   Skin: Warm. Dry. Good turgor. No rash. No vasculitic stigmata.  Psychiatric: Appropriate affect and mood for situation.         Laboratory Studies--  CBC                        9.7    13.47 )-----------( 242      ( 19 Mar 2019 07:37 )             29.1       Chemistries  03-19    146<H>  |  115<H>  |  16  ----------------------------<  65<L>  3.9   |  19<L>  |  0.63    Ca    7.4<L>      19 Mar 2019 07:37  Mg     2.1     03-18    Sedimentation Rate, Erythrocyte (03.17.19 @ 05:06)    Sedimentation Rate, Erythrocyte: 39 mm/hr    C-Reactive Protein, Serum (03.17.19 @ 11:44)    C-Reactive Protein, Serum: 3.29 mg/dL        Culture Data    Culture - Blood (collected 17 Mar 2019 14:27)  Source: .Blood Blood  Preliminary Report (18 Mar 2019 15:00):    No growth to date.    Culture - Blood (collected 17 Mar 2019 14:27)  Source: .Blood Blood  Preliminary Report (18 Mar 2019 15:00):    No growth to date.    Culture - Urine (collected 15 Mar 2019 12:31)  Source: .Urine Clean Catch (Midstream)  Final Report (16 Mar 2019 12:02):    No growth        RADIOLOGY:    CT Abdomen and Pelvis w/ Oral Cont and w/ IV Cont (03.16.19 @ 17:07) >  FINDINGS:  The lung bases are clear and there is no evidence of a pleural effusion.    There is a small amount of ascites which is a new finding since the prior   examination.    The liver and gallbladder demonstrate no abnormality. The spleen is not   enlarged and demonstrates no focal abnormality. The pancreatic contour is   unremarkable without evidence of mass, inflammation or ductal dilatation.    The adrenal glands demonstrate normal size and contour.    Small cyst in the upper pole of the right kidney. The right kidney is   otherwise unremarkable. The left kidney is unremarkable.    Small hiatus hernia.    No evidence of retroperitoneal or pelvic lymphadenopathy.    The bladder is unremarkable. No evidence of pelvic mass.    There is been no significant change in the appearance of the sigmoid   colon since the prior study. Inflammatory changes are noted in the   pericolonic fat at this site. No evidence of abscess. No evidence of   pneumoperitoneum.    Degenerative changes in the spine.    IMPRESSION:    Persistent abnormality in the region of the sigmoid colon without change   in appearance since the prior study. There are pericolonic inflammatory   changes in this region suggesting diverticulitis. No evidence of   associated abscess. There is now a small amount of ascites in the upper   abdomen adjacent to the liver which is a new finding since the prior   examination..        US Abdomen Complete (03.15.19 @ 09:49) >    FINDINGS:    Liver: Within normal limits.    Bile ducts: Normal caliber. Common bile duct measures 5 mm.     Gallbladder: Within normal limits.        Pancreas: Visualized portions are within normal limits.    Spleen: 9 cm. Within normal limits.    Right kidney: 10 cm. No hydronephrosis. Suggestion of a right-sided   extrarenal pelvis.    Left kidney: 10 cm.  No hydronephrosis.        Ascites: None.    Aorta and IVC: Visualized portions are within normal limits.    IMPRESSION: No gallstones or kidney stones.     CT Abdomen and Pelvis w/ Oral Cont and w/ IV Cont (03.11.19 @ 16:04) >  Impression:    CT findings as discussed which may reflect sigmoid diverticulitis.  Cannot exclude underlying transient mucosal mass or underlying neoplasm.  Recommend further evaluation with colonoscopy if this has not recently   been performed.  This finding was discussed with Dr. Lin at the time of   interpretation on 3/11/2019.        Assessment :   79 year old female hx of diverticulitis , last episode in Dec 2017, admitted with recurrent sigmoid diverticulitis not improved on po antibiotics x 6 days , with worsening abdominal pain and low grade fevers. Unfortunately no blood cs done . Now with increased WBC, low grade fever and worsening pain. Very concerned about having surgery, which is tentatively scheduled for tomorrow .    Repeat ct scan of abdomen did not show any changes in the inflammatory reaction or abscess development .    Plan :   - will continue with  Meropenem 1 gram q 8 hours to cover for possible ESBL  GNR infection as she was treated with antibiotics recently in Decembers and last week   - keep NPO   - serial abdominal exams and close monitoring for signs of generalized peritonitis   - she is scheduled for  surgery today, will follow intra op findings    - trend cbc    D/W with Dr. Spencer and Dr. Contreras     Continue with present regime .  Appropriate use of antibiotics and adverse effects reviewed.    I have discussed the above plan of care with patient and her  present at bedside in detail. They expressed understanding of the treatment plan . Risks, benefits and alternatives discussed in detail. I have asked if they have any questions or concerns and appropriately addressed them to the best of my ability .      > 35 minutes spent in direct patient care reviewing  the notes, lab data/ imaging , discussion with multidisciplinary team. All questions were addressed and answered to the best of my capacity .    Thank you for allowing me to participate in the care of your patient .        Phoebe Urban MD  [phone]

## 2019-03-19 NOTE — PROGRESS NOTE ADULT - ASSESSMENT
This is a 79 F comes with hypothyroidism, HLD, hx of diverticulitis, who presented to the ED with abdominal pain for 1 week, admitted for sigmoid diverticulitis. Patient now on Meropenem (Day 2), currently NPO, possible exploratory laparotomy today.      ACUTE SIGMOID DIVERTICULITIS WITHOUT PERFORATION  -CT abdomen: persistent abnormality in the region of the sigmoid colon, pericolonic inflammatory   changes in this region suggesting diverticulitis   -WBC trending upward yesterday afternoon, trending down this AM, now afebrile, tylenol for fever   -NPO, surgery Dr. Spencer following, pending possible exploratory laparotomy today, F/u recs   -Patient noted to have blood sugar of 65 this AM, now on D5/NS @ 100 for IVF   -Continue with Meropenum, ID  (WINIFRED Urban) following, f/u recs   -Pain management: Tylenol Q6 mild pain, morphine 2 mg Q4 moderate pain, Dilaudid .5 Q4 severe pain    -Zofran for nausea   -Blood culture prelim NGTD   -Continue with protonix 40 IV daily   -GI  Dr. Burgos, f/u recs     HYPOTHYROID  Chronic issue. Continue synthroid.     DYSLIPIDEMIA  Chronic issue. Home medication pravastatin, atorvastatin while inpatient (therapeutic interchange).       Need for prophylactic measures:   -Continue home medications of restasis and systane for dry eye  -SCDS for DVT prophylaxis   BB IMPROVE VTE Individual Risk Assessment          RISK                                                          Points    [  ] Previous VTE                                                3  [  ] Thrombophilia                                             2  [  ] Lower limb paralysis                                   2        (unable to hold up >15 seconds)    [  ] Current Cancer                                            2         (within 6 months)  [  ] Immobilization > 24 hrs                              1  [  ] ICU/CCU stay > 24 hours                            1  [ 1 ] Age > 60                                                    1    IMPROVE VTE Score __1_______ This is a 79 F comes with hypothyroidism, HLD, hx of diverticulitis, who presented to the ED with abdominal pain for 1 week, admitted for sigmoid diverticulitis. Patient now on Meropenem (Day 2), currently NPO, possible exploratory laparotomy today.      ACUTE SIGMOID DIVERTICULITIS WITHOUT PERFORATION  -CT abdomen: persistent abnormality in the region of the sigmoid colon, pericolonic inflammatory   changes in this region suggesting diverticulitis   -WBC trending upward yesterday afternoon, trending down this AM, now afebrile, tylenol for fever   -NPO, surgery Dr. Spencer following, pending possible exploratory laparotomy today, F/u recs   -Patient noted to have blood sugar of 65 this AM, now on D5/NS @ 100 for IVF, BS checks Q8 hrs while NPO  -Continue with Meropenum, ID DrBonnie (WINIFRED Urban) following, f/u recs   -Pain management: Tylenol Q6 mild pain, morphine 2 mg Q4 moderate pain, Dilaudid .5 Q4 severe pain    -Zofran for nausea   -Blood culture prelim NGTD   -Continue with protonix 40 IV daily   -GI  Dr. Burgos, f/u recs     HYPOTHYROID  Chronic issue. Continue synthroid.     DYSLIPIDEMIA  Chronic issue. Home medication pravastatin, atorvastatin while inpatient (therapeutic interchange).       Need for prophylactic measures:   -Continue home medications of restasis and systane for dry eye  -SCDS for DVT prophylaxis   BB IMPROVE VTE Individual Risk Assessment          RISK                                                          Points    [  ] Previous VTE                                                3  [  ] Thrombophilia                                             2  [  ] Lower limb paralysis                                   2        (unable to hold up >15 seconds)    [  ] Current Cancer                                            2         (within 6 months)  [  ] Immobilization > 24 hrs                              1  [  ] ICU/CCU stay > 24 hours                            1  [ 1 ] Age > 60                                                    1    IMPROVE VTE Score __1_______

## 2019-03-19 NOTE — DIETITIAN INITIAL EVALUATION ADULT. - NS AS NUTRI INTERV ED CONTENT
Will remain available for low fiber nutrition therapy when medically appropriate. Written and verbal low fiber nutrition therapy discussed at length. Pt with good understanding of material discussed. RDs name/phone number left with patient if questions/concerns arise.

## 2019-03-19 NOTE — BRIEF OPERATIVE NOTE - NSICDXBRIEFPROCEDURE_GEN_ALL_CORE_FT
PROCEDURES:  Melania procedure 19-Mar-2019 16:35:55  Armando Spencer  Exploratory laparotomy 19-Mar-2019 16:35:32  Armando Spencer

## 2019-03-19 NOTE — DIETITIAN INITIAL EVALUATION ADULT. - FACTORS AFF FOOD INTAKE
other (specify)/persistent constipation/dx sigmoid diverticulitis/pain pain/persistent constipation/persistent lack of appetite/dx sigmoid diverticulitis/other (specify)

## 2019-03-19 NOTE — DIETITIAN INITIAL EVALUATION ADULT. - NS AS NUTRI INTERV MEALS SNACK
When medically feasible recommend clear liquid diet and advance as tolerated to Low fiber. When medically feasible recommend clear liquid diet and advance as tolerated to Low fiber. Encourage po fluids when medically feasible.

## 2019-03-19 NOTE — DIETITIAN INITIAL EVALUATION ADULT. - NUTRITION INTERVENTIONS
nutrient dense snacks/food preferences as requested by patient (specify)/No onions, pts prefers blander food (no spicy or heavily seasoned foods)

## 2019-03-20 LAB
ALBUMIN SERPL ELPH-MCNC: 1.9 G/DL — LOW (ref 3.3–5)
ALP SERPL-CCNC: 57 U/L — SIGNIFICANT CHANGE UP (ref 40–120)
ALT FLD-CCNC: 18 U/L — SIGNIFICANT CHANGE UP (ref 12–78)
ANION GAP SERPL CALC-SCNC: 8 MMOL/L — SIGNIFICANT CHANGE UP (ref 5–17)
AST SERPL-CCNC: 20 U/L — SIGNIFICANT CHANGE UP (ref 15–37)
BASOPHILS # BLD AUTO: 0 K/UL — SIGNIFICANT CHANGE UP (ref 0–0.2)
BASOPHILS NFR BLD AUTO: 0 % — SIGNIFICANT CHANGE UP (ref 0–2)
BILIRUB SERPL-MCNC: 0.4 MG/DL — SIGNIFICANT CHANGE UP (ref 0.2–1.2)
BUN SERPL-MCNC: 11 MG/DL — SIGNIFICANT CHANGE UP (ref 7–23)
CALCIUM SERPL-MCNC: 7.5 MG/DL — LOW (ref 8.5–10.1)
CHLORIDE SERPL-SCNC: 114 MMOL/L — HIGH (ref 96–108)
CO2 SERPL-SCNC: 23 MMOL/L — SIGNIFICANT CHANGE UP (ref 22–31)
CREAT SERPL-MCNC: 0.58 MG/DL — SIGNIFICANT CHANGE UP (ref 0.5–1.3)
EOSINOPHIL # BLD AUTO: 0 K/UL — SIGNIFICANT CHANGE UP (ref 0–0.5)
EOSINOPHIL NFR BLD AUTO: 0 % — SIGNIFICANT CHANGE UP (ref 0–6)
GLUCOSE SERPL-MCNC: 185 MG/DL — HIGH (ref 70–99)
HCT VFR BLD CALC: 32.3 % — LOW (ref 34.5–45)
HGB BLD-MCNC: 10.9 G/DL — LOW (ref 11.5–15.5)
LYMPHOCYTES # BLD AUTO: 0.14 K/UL — LOW (ref 1–3.3)
LYMPHOCYTES # BLD AUTO: 1 % — LOW (ref 13–44)
MCHC RBC-ENTMCNC: 31.6 PG — SIGNIFICANT CHANGE UP (ref 27–34)
MCHC RBC-ENTMCNC: 33.7 GM/DL — SIGNIFICANT CHANGE UP (ref 32–36)
MCV RBC AUTO: 93.6 FL — SIGNIFICANT CHANGE UP (ref 80–100)
MONOCYTES # BLD AUTO: 0.27 K/UL — SIGNIFICANT CHANGE UP (ref 0–0.9)
MONOCYTES NFR BLD AUTO: 2 % — SIGNIFICANT CHANGE UP (ref 2–14)
NEUTROPHILS # BLD AUTO: 13.08 K/UL — HIGH (ref 1.8–7.4)
NEUTROPHILS NFR BLD AUTO: 90 % — HIGH (ref 43–77)
NRBC # BLD: SIGNIFICANT CHANGE UP /100 WBCS (ref 0–0)
PLATELET # BLD AUTO: 298 K/UL — SIGNIFICANT CHANGE UP (ref 150–400)
POTASSIUM SERPL-MCNC: 3.8 MMOL/L — SIGNIFICANT CHANGE UP (ref 3.5–5.3)
POTASSIUM SERPL-SCNC: 3.8 MMOL/L — SIGNIFICANT CHANGE UP (ref 3.5–5.3)
PROT SERPL-MCNC: 5.5 G/DL — LOW (ref 6–8.3)
RBC # BLD: 3.45 M/UL — LOW (ref 3.8–5.2)
RBC # FLD: 13.2 % — SIGNIFICANT CHANGE UP (ref 10.3–14.5)
SODIUM SERPL-SCNC: 145 MMOL/L — SIGNIFICANT CHANGE UP (ref 135–145)
WBC # BLD: 13.63 K/UL — HIGH (ref 3.8–10.5)
WBC # FLD AUTO: 13.63 K/UL — HIGH (ref 3.8–10.5)

## 2019-03-20 RX ORDER — HYDROMORPHONE HYDROCHLORIDE 2 MG/ML
30 INJECTION INTRAMUSCULAR; INTRAVENOUS; SUBCUTANEOUS
Qty: 0 | Refills: 0 | Status: DISCONTINUED | OUTPATIENT
Start: 2019-03-20 | End: 2019-03-20

## 2019-03-20 RX ORDER — ALPRAZOLAM 0.25 MG
0.25 TABLET ORAL EVERY 6 HOURS
Qty: 0 | Refills: 0 | Status: DISCONTINUED | OUTPATIENT
Start: 2019-03-20 | End: 2019-03-27

## 2019-03-20 RX ORDER — NALOXONE HYDROCHLORIDE 4 MG/.1ML
0.1 SPRAY NASAL
Qty: 0 | Refills: 0 | Status: DISCONTINUED | OUTPATIENT
Start: 2019-03-20 | End: 2019-03-29

## 2019-03-20 RX ADMIN — Medication 1000 MILLIGRAM(S): at 14:30

## 2019-03-20 RX ADMIN — Medication 400 MILLIGRAM(S): at 21:33

## 2019-03-20 RX ADMIN — Medication 400 MILLIGRAM(S): at 00:15

## 2019-03-20 RX ADMIN — Medication 1000 MILLIGRAM(S): at 00:45

## 2019-03-20 RX ADMIN — SODIUM CHLORIDE 100 MILLILITER(S): 9 INJECTION, SOLUTION INTRAVENOUS at 05:38

## 2019-03-20 RX ADMIN — PANTOPRAZOLE SODIUM 40 MILLIGRAM(S): 20 TABLET, DELAYED RELEASE ORAL at 12:48

## 2019-03-20 RX ADMIN — Medication 1 TABLET(S): at 18:19

## 2019-03-20 RX ADMIN — Medication 1 TABLET(S): at 08:07

## 2019-03-20 RX ADMIN — Medication 400 MILLIGRAM(S): at 13:33

## 2019-03-20 RX ADMIN — MEROPENEM 100 MILLIGRAM(S): 1 INJECTION INTRAVENOUS at 05:38

## 2019-03-20 RX ADMIN — ENOXAPARIN SODIUM 40 MILLIGRAM(S): 100 INJECTION SUBCUTANEOUS at 12:49

## 2019-03-20 RX ADMIN — Medication 25 MICROGRAM(S): at 05:39

## 2019-03-20 RX ADMIN — MEROPENEM 100 MILLIGRAM(S): 1 INJECTION INTRAVENOUS at 23:00

## 2019-03-20 RX ADMIN — MEROPENEM 100 MILLIGRAM(S): 1 INJECTION INTRAVENOUS at 13:58

## 2019-03-20 RX ADMIN — ATORVASTATIN CALCIUM 10 MILLIGRAM(S): 80 TABLET, FILM COATED ORAL at 21:33

## 2019-03-20 RX ADMIN — Medication 1 TABLET(S): at 12:48

## 2019-03-20 RX ADMIN — Medication 1000 MILLIGRAM(S): at 22:03

## 2019-03-20 RX ADMIN — MORPHINE SULFATE 4 MILLIGRAM(S): 50 CAPSULE, EXTENDED RELEASE ORAL at 08:12

## 2019-03-20 NOTE — PROGRESS NOTE ADULT - ASSESSMENT
This is a 79 F comes with hypothyroidism, HLD, hx of diverticulitis, who presented to the ED with abdominal pain for 1 week, admitted for sigmoid diverticulitis. Patient now on Meropenem (Day 3), s/p  exploratory laparotomy today (3/20/19) now with colostomy bag.       ACUTE SIGMOID DIVERTICULITIS WITHOUT PERFORATION  -CT abdomen: persistent abnormality in the region of the sigmoid colon, pericolonic inflammatory   changes in this region suggesting diverticulitis   -S/p exploratory lapatromy 3/19/19, now with colostomy bag   -WBC mildly elevated, now afebrile, tylenol for fever   - currently NPO, surgery Dr. Spencer following, F/u recs   -Continue with IVF: D5/NS @ 100 for IVF, BS checks Q8 hrs while NPO  -Continue with Meropenum (Day 3), ID DrBonnie (WINIFRED Urban) following, f/u recs   -Pain management: Mild: Oxycodone IR 5 mg Q4 Moderate: Oxycodone IR 10 mg Q4, Severe: morphine 4 IV Q4   -Zofran for nausea   -Blood culture prelim NGTD   -Continue with protonix 40 IV daily   -GI  Dr. Burgos, f/u recs     HYPOTHYROID  Chronic issue. Continue synthroid.     DYSLIPIDEMIA  Chronic issue. Home medication pravastatin, atorvastatin while inpatient (therapeutic interchange).       Need for prophylactic measures:   -Continue home medications of restasis and systane for dry eye  -Lovenox for DVT prophylaxis   BB IMPROVE VTE Individual Risk Assessment          RISK                                                          Points    [  ] Previous VTE                                                3  [  ] Thrombophilia                                             2  [  ] Lower limb paralysis                                   2        (unable to hold up >15 seconds)    [  ] Current Cancer                                            2         (within 6 months)  [  ] Immobilization > 24 hrs                              1  [  ] ICU/CCU stay > 24 hours                            1  [ 1 ] Age > 60                                                    1    IMPROVE VTE Score __1_______ This is a 79 F comes with hypothyroidism, HLD, hx of diverticulitis, who presented to the ED with abdominal pain for 1 week, admitted for sigmoid diverticulitis. Patient now on Meropenem (Day 3), s/p  exploratory laparotomy today (3/20/19) now with colostomy bag.       ACUTE SIGMOID DIVERTICULITIS WITHOUT PERFORATION  -CT abdomen: persistent abnormality in the region of the sigmoid colon, pericolonic inflammatory   changes in this region suggesting diverticulitis   -S/p exploratory lapatromy 3/19/19, now with colostomy bag   -WBC mildly elevated, now afebrile, tylenol for fever   - currently NPO, surgery Dr. Spencer following, F/u recs   -Continue with IVF: D5/NS @ 100 for IVF, BS checks Q8 hrs while NPO  -Continue with Meropenum (Day 3), ID DrBonnie (WINIFRED Urban) following, f/u recs   -Pain management: Mild: Oxycodone IR 5 mg Q4 Moderate: Oxycodone IR 10 mg Q4, Severe: morphine 4 IV Q4   -Zofran for nausea   -Blood culture prelim NGTD   -Continue with protonix 40 IV daily   -GI  Dr. Burgos, f/u recs     HYPOTHYROID  Chronic issue. Continue synthroid.     DYSLIPIDEMIA  Chronic issue. Home medication pravastatin, atorvastatin while inpatient (therapeutic interchange).       advance care planning discussed- pt to provide health care proxy w forms w appointed health care agent documented  Need for prophylactic measures:   -Continue home medications of restasis and systane for dry eye  -Lovenox for DVT prophylaxis   BB IMPROVE VTE Individual Risk Assessment          RISK                                                          Points    [  ] Previous VTE                                                3  [  ] Thrombophilia                                             2  [  ] Lower limb paralysis                                   2        (unable to hold up >15 seconds)    [  ] Current Cancer                                            2         (within 6 months)  [  ] Immobilization > 24 hrs                              1  [  ] ICU/CCU stay > 24 hours                            1  [ 1 ] Age > 60                                                    1    IMPROVE VTE Score __1_______ This is a 79 F comes with hypothyroidism, HLD, hx of diverticulitis, who presented to the ED with abdominal pain for 1 week, admitted for sigmoid diverticulitis. Patient now on Meropenem (Day 3), s/p  exploratory laparotomy today (3/20/19) now with colostomy bag.       ACUTE SIGMOID DIVERTICULITIS WITHOUT PERFORATION  -CT abdomen: persistent abnormality in the region of the sigmoid colon, pericolonic inflammatory   changes in this region suggesting diverticulitis   -S/p exploratory lapatromy 3/19/19, shankar procedure, now with colostomy bag   -WBC mildly elevated, now afebrile, tylenol for fever   - currently NPO, surgery Dr. Spencer following, F/u recs   -Continue with IVF: D5/NS @ 100 for IVF, BS checks Q8 hrs while NPO  -Continue with Meropenum (Day 3), ID DrBonnie (WINIFRED Urban) following, f/u recs   -Pain management: Mild: Oxycodone IR 5 mg Q4 Moderate: Oxycodone IR 10 mg Q4, Severe: morphine 4 IV Q4   -Zofran for nausea   -Blood culture prelim NGTD   -Continue with protonix 40 IV daily   -GI  Dr. Burgos, f/u recs     HYPOTHYROID  Chronic issue. Continue synthroid.     DYSLIPIDEMIA  Chronic issue. Home medication pravastatin, atorvastatin while inpatient (therapeutic interchange).       advance care planning discussed- pt to provide health care proxy w forms w appointed health care agent documented  Need for prophylactic measures:   -Continue home medications of restasis and systane for dry eye  -Lovenox for DVT prophylaxis   BB IMPROVE VTE Individual Risk Assessment          RISK                                                          Points    [  ] Previous VTE                                                3  [  ] Thrombophilia                                             2  [  ] Lower limb paralysis                                   2        (unable to hold up >15 seconds)    [  ] Current Cancer                                            2         (within 6 months)  [  ] Immobilization > 24 hrs                              1  [  ] ICU/CCU stay > 24 hours                            1  [ 1 ] Age > 60                                                    1    IMPROVE VTE Score __1_______ This is a 79 F comes with hypothyroidism, HLD, hx of diverticulitis, who presented to the ED with abdominal pain for 1 week, admitted for sigmoid diverticulitis. Patient now on Meropenem (Day 3), s/p  exploratory laparotomy today (3/20/19) now with colostomy bag.       ACUTE SIGMOID DIVERTICULITIS WITHOUT PERFORATION  -CT abdomen: persistent abnormality in the region of the sigmoid colon, pericolonic inflammatory   changes in this region suggesting diverticulitis   -S/p exploratory lapatromy 3/19/19, shankar procedure  -WBC mildly elevated, now afebrile, tylenol for fever   - currently NPO, surgery Dr. Spencer following, F/u recs   -Continue with IVF: D5/NS @ 100 for IVF, BS checks Q8 hrs while NPO  -Continue with Meropenum (Day 3), ID DrBonnie (WINIFRED Urban) following, f/u recs   -Pain management: Mild: Oxycodone IR 5 mg Q4 Moderate: Oxycodone IR 10 mg Q4, Severe: morphine 4 IV Q4   -Zofran for nausea   -Blood culture prelim NGTD   -Continue with protonix 40 IV daily   -GI  Dr. Burgos, f/u recs     HYPOTHYROID  Chronic issue. Continue synthroid.     DYSLIPIDEMIA  Chronic issue. Home medication pravastatin, atorvastatin while inpatient (therapeutic interchange).       advance care planning discussed- pt to provide health care proxy w forms w appointed health care agent documented  Need for prophylactic measures:   -Continue home medications of restasis and systane for dry eye  -Lovenox for DVT prophylaxis   BB IMPROVE VTE Individual Risk Assessment          RISK                                                          Points    [  ] Previous VTE                                                3  [  ] Thrombophilia                                             2  [  ] Lower limb paralysis                                   2        (unable to hold up >15 seconds)    [  ] Current Cancer                                            2         (within 6 months)  [  ] Immobilization > 24 hrs                              1  [  ] ICU/CCU stay > 24 hours                            1  [ 1 ] Age > 60                                                    1    IMPROVE VTE Score __1_______

## 2019-03-20 NOTE — PROGRESS NOTE ADULT - SUBJECTIVE AND OBJECTIVE BOX
Called by Dr Spencer to start PCA because of 8/10 pain. Discussed PCA with patient. Will start PCA now

## 2019-03-20 NOTE — PROGRESS NOTE ADULT - SUBJECTIVE AND OBJECTIVE BOX
Patient is a 79y old  Female who presents with a chief complaint of diverticulitis (19 Mar 2019 09:04)      INTERVAL HPI/OVERNIGHT EVENTS: Patient seen and examined at bedside. S/p exploratory laparotomy 3/19/19, now with colostomy bag. Patient admits to abdominal pain, denies any other symptoms. Currently NPO.        T(C): 37.3 (03-20-19 @ 07:59), Max: 37.4 (03-19-19 @ 20:24)  HR: 64 (03-20-19 @ 07:59) (64 - 100)  BP: 122/72 (03-20-19 @ 07:59) (114/67 - 151/80)  RR: 18 (03-20-19 @ 07:59) (11 - 23)  SpO2: 98% (03-20-19 @ 07:59) (92% - 98%)  Wt(kg): --  I&O's Summary    19 Mar 2019 07:01  -  20 Mar 2019 07:00  --------------------------------------------------------  IN: 1850 mL / OUT: 1452 mL / NET: 398 mL        LABS:                        10.9   13.63 )-----------( 298      ( 20 Mar 2019 07:53 )             32.3     03-20    145  |  114<H>  |  11  ----------------------------<  185<H>  3.8   |  23  |  0.58    Ca    7.5<L>      20 Mar 2019 07:53    TPro  5.5<L>  /  Alb  1.9<L>  /  TBili  0.4  /  DBili  x   /  AST  20  /  ALT  18  /  AlkPhos  57  03-20        CAPILLARY BLOOD GLUCOSE      POCT Blood Glucose.: 181 mg/dL (20 Mar 2019 07:43)  POCT Blood Glucose.: 153 mg/dL (20 Mar 2019 00:42)  POCT Blood Glucose.: 104 mg/dL (19 Mar 2019 16:54)  POCT Blood Glucose.: 88 mg/dL (19 Mar 2019 14:33)            MEDICATIONS  (STANDING):  acetaminophen  IVPB .. 1000 milliGRAM(s) IV Intermittent once  acetaminophen  IVPB .. 1000 milliGRAM(s) IV Intermittent once  atorvastatin 10 milliGRAM(s) Oral at bedtime  dextrose 5% + sodium chloride 0.9%. 1000 milliLiter(s) (100 mL/Hr) IV Continuous <Continuous>  enoxaparin Injectable 40 milliGRAM(s) SubCutaneous every 24 hours  lactobacillus acidophilus 1 Tablet(s) Oral three times a day with meals  levothyroxine 25 MICROGram(s) Oral daily  meropenem  IVPB 1000 milliGRAM(s) IV Intermittent every 8 hours  pantoprazole  Injectable 40 milliGRAM(s) IV Push daily    MEDICATIONS  (PRN):  artificial  tears Solution 1 Drop(s) Both EYES two times a day PRN Dry Eyes  morphine  - Injectable 4 milliGRAM(s) IV Push every 4 hours PRN Severe Pain (7 - 10)  ondansetron Injectable 4 milliGRAM(s) IV Push every 6 hours PRN Nausea and/or Vomiting  oxyCODONE    IR 5 milliGRAM(s) Oral every 4 hours PRN Mild Pain (1 - 3)  oxyCODONE    IR 10 milliGRAM(s) Oral every 4 hours PRN Moderate Pain (4 - 6)      REVIEW OF SYSTEMS:  CONSTITUTIONAL: No fever, weight loss, or fatigue  ENMT:  No throat pain  RESPIRATORY: No cough, wheezing, chills or hemoptysis; No shortness of breath  CARDIOVASCULAR: No chest pain, palpitations, dizziness, or leg swelling  GASTROINTESTINAL: + abdominal pain, no nausea, vomiting, or hematemesis  GENITOURINARY: No dysuria, frequency, hematuria, or incontinence (with adkins)   NEUROLOGICAL: No headaches, memory loss, loss of strength, numbness, or tremors    RADIOLOGY & ADDITIONAL TESTS: NONE     Imaging Personally Reviewed:  [ ] YES  [ ] NO    Consultant(s) Notes Reviewed:  [X ] YES  [ ] NO    PHYSICAL EXAM:  GENERAL: anxious   HEAD: atraumatic, Normocephalic  EYES:  PERRL, conjunctiva and sclera clear  NERVOUS SYSTEM:  Alert & Oriented, Good concentration  CHEST/LUNG: Clear to percussion bilaterally; No rales, rhonchi, wheezing, or rubs  HEART: Regular rate and rhythm; No murmurs, rubs, or gallops  ABDOMEN: +colostomy bag, surgical dressing dry and intact, generalized tenderness, soft   : + adkins   EXTREMITIES: No clubbing, cyanosis, or edema    Care Discussed with Consultants/Other Providers [ ] YES  [ ] NO Patient is a 79y old  Female who presents with a chief complaint of diverticulitis (19 Mar 2019 09:04)      INTERVAL HPI/OVERNIGHT EVENTS: Patient seen and examined at bedside. S/p exploratory laparotomy 3/19/19, Florez procedure. Patient admits to abdominal pain, denies any other symptoms. Currently NPO.        T(C): 37.3 (03-20-19 @ 07:59), Max: 37.4 (03-19-19 @ 20:24)  HR: 64 (03-20-19 @ 07:59) (64 - 100)  BP: 122/72 (03-20-19 @ 07:59) (114/67 - 151/80)  RR: 18 (03-20-19 @ 07:59) (11 - 23)  SpO2: 98% (03-20-19 @ 07:59) (92% - 98%)  Wt(kg): --  I&O's Summary    19 Mar 2019 07:01  -  20 Mar 2019 07:00  --------------------------------------------------------  IN: 1850 mL / OUT: 1452 mL / NET: 398 mL        LABS:                        10.9   13.63 )-----------( 298      ( 20 Mar 2019 07:53 )             32.3     03-20    145  |  114<H>  |  11  ----------------------------<  185<H>  3.8   |  23  |  0.58    Ca    7.5<L>      20 Mar 2019 07:53    TPro  5.5<L>  /  Alb  1.9<L>  /  TBili  0.4  /  DBili  x   /  AST  20  /  ALT  18  /  AlkPhos  57  03-20        CAPILLARY BLOOD GLUCOSE      POCT Blood Glucose.: 181 mg/dL (20 Mar 2019 07:43)  POCT Blood Glucose.: 153 mg/dL (20 Mar 2019 00:42)  POCT Blood Glucose.: 104 mg/dL (19 Mar 2019 16:54)  POCT Blood Glucose.: 88 mg/dL (19 Mar 2019 14:33)            MEDICATIONS  (STANDING):  acetaminophen  IVPB .. 1000 milliGRAM(s) IV Intermittent once  acetaminophen  IVPB .. 1000 milliGRAM(s) IV Intermittent once  atorvastatin 10 milliGRAM(s) Oral at bedtime  dextrose 5% + sodium chloride 0.9%. 1000 milliLiter(s) (100 mL/Hr) IV Continuous <Continuous>  enoxaparin Injectable 40 milliGRAM(s) SubCutaneous every 24 hours  lactobacillus acidophilus 1 Tablet(s) Oral three times a day with meals  levothyroxine 25 MICROGram(s) Oral daily  meropenem  IVPB 1000 milliGRAM(s) IV Intermittent every 8 hours  pantoprazole  Injectable 40 milliGRAM(s) IV Push daily    MEDICATIONS  (PRN):  artificial  tears Solution 1 Drop(s) Both EYES two times a day PRN Dry Eyes  morphine  - Injectable 4 milliGRAM(s) IV Push every 4 hours PRN Severe Pain (7 - 10)  ondansetron Injectable 4 milliGRAM(s) IV Push every 6 hours PRN Nausea and/or Vomiting  oxyCODONE    IR 5 milliGRAM(s) Oral every 4 hours PRN Mild Pain (1 - 3)  oxyCODONE    IR 10 milliGRAM(s) Oral every 4 hours PRN Moderate Pain (4 - 6)      REVIEW OF SYSTEMS:  CONSTITUTIONAL: No fever, weight loss, or fatigue  ENMT:  No throat pain  RESPIRATORY: No cough, wheezing, chills or hemoptysis; No shortness of breath  CARDIOVASCULAR: No chest pain, palpitations, dizziness, or leg swelling  GASTROINTESTINAL: + abdominal pain, no nausea, vomiting, or hematemesis  GENITOURINARY: No dysuria, frequency, hematuria, or incontinence (with adkins)   NEUROLOGICAL: No headaches, memory loss, loss of strength, numbness, or tremors    RADIOLOGY & ADDITIONAL TESTS: NONE     Imaging Personally Reviewed:  [ ] YES  [ ] NO    Consultant(s) Notes Reviewed:  [X ] YES  [ ] NO    PHYSICAL EXAM:  GENERAL: anxious   HEAD: atraumatic, Normocephalic  EYES:  PERRL, conjunctiva and sclera clear  NERVOUS SYSTEM:  Alert & Oriented, Good concentration  CHEST/LUNG: Clear to percussion bilaterally; No rales, rhonchi, wheezing, or rubs  HEART: Regular rate and rhythm; No murmurs, rubs, or gallops  ABDOMEN: +colostomy bag, surgical dressing dry and intact, generalized tenderness, soft   : + adkins   EXTREMITIES: No clubbing, cyanosis, or edema    Care Discussed with Consultants/Other Providers [ ] YES  [ ] NO

## 2019-03-20 NOTE — PROGRESS NOTE ADULT - SUBJECTIVE AND OBJECTIVE BOX
The patient was interviewed and evaluated  79y Female    Vital Signs Last 24 Hrs  T(C): 37.3 (20 Mar 2019 07:59), Max: 37.4 (19 Mar 2019 20:24)  T(F): 99.2 (20 Mar 2019 07:59), Max: 99.4 (19 Mar 2019 20:24)  HR: 64 (20 Mar 2019 07:59) (64 - 100)  BP: 122/72 (20 Mar 2019 07:59) (114/67 - 151/80)  BP(mean): --  RR: 18 (20 Mar 2019 07:59) (11 - 23)  SpO2: 98% (20 Mar 2019 07:59) (92% - 98%)    Pt seen, doing well, no anesthesia complications or complaints noted or reported.   No Nausea    No additional recommendations.     Pain well controlled

## 2019-03-20 NOTE — PROGRESS NOTE ADULT - SUBJECTIVE AND OBJECTIVE BOX
ID progress note     Name: MARIO HARRY  Age: 79y  Gender: Female  MRN: [id number]    Interval History-- Events noted, feels slightly better, S/p Florez's procedure with left colostomy, noted to have large inflammatory sigmoid phlegmon no abscess or perforation noted , await cs and pathology, now  has NGT and remains NPO. No air or stool in colostomy bad   Notes reviewed    Past Medical History--  Diverticulosis  Hyperlipidemia  Hypothyroidism  H/O arthroscopic knee surgery  No significant past surgical history      For details regarding the patient's social history, family history, and other miscellaneous elements, please refer the initial infectious diseases consultation and/or the admitting history and physical examination for this admission.    Allergies--  Allergies    clindamycin (Stomach Upset)    Intolerances        Medications--  Antibiotics:  meropenem  IVPB 1000 milliGRAM(s) IV Intermittent every 8 hours    Immunologic:    Other:  acetaminophen  IVPB ..  ALPRAZolam PRN  artificial  tears Solution PRN  atorvastatin  dextrose 5% + sodium chloride 0.9%.  enoxaparin Injectable  lactobacillus acidophilus  levothyroxine  morphine  - Injectable PRN  naloxone Injectable PRN  ondansetron Injectable PRN  oxyCODONE    IR PRN  oxyCODONE    IR PRN  pantoprazole  Injectable      Review of Systems--  A 10-point review of systems was obtained.     Pertinent positives and negatives--  Constitutional: No fevers. No Chills. No Rigors.   Cardiovascular: No chest pain. No palpitations.  Respiratory: No shortness of breath. No cough.  Gastrointestinal: No nausea or vomiting. No diarrhea or constipation.   Psychiatric: Pleasant. Appropriate affect.    Review of systems otherwise negative except as previously noted.    Physical Examination--  Vital Signs: T(F): 99.2 (03-20-19 @ 07:59), Max: 99.4 (03-19-19 @ 20:24)  HR: 64 (03-20-19 @ 07:59)  BP: 122/72 (03-20-19 @ 07:59)  RR: 18 (03-20-19 @ 07:59)  SpO2: 98% (03-20-19 @ 07:59)  Wt(kg): --  General: Nontoxic-appearing Female in no acute distress.  HEENT: AT/NC. PERRL. EOMI. Anicteric. Conjunctiva pink and moist. Oropharynx clear. Dentition fair.  Neck: Not rigid. No sense of mass.  Nodes: None palpable.  Lungs: decreased breath sounds  bilaterally without rales, wheezing or rhonchi  Heart: Regular rate and rhythm. No Murmur. No rub. No gallop. No palpable thrill.  Abdomen: Bowel sounds present and normoactive. Soft. Nondistended. Nontender. No sense of mass. No organomegaly.  Back: No spinal tenderness. No costovertebral angle tenderness.   Extremities: No cyanosis or clubbing. No edema.   Skin: Warm. Dry. Good turgor. No rash. No vasculitic stigmata.  Psychiatric: Appropriate affect and mood for situation.         Laboratory Studies--  CBC                        10.9   13.63 )-----------( 298      ( 20 Mar 2019 07:53 )             32.3       Chemistries  03-20    145  |  114<H>  |  11  ----------------------------<  185<H>  3.8   |  23  |  0.58    Ca    7.5<L>      20 Mar 2019 07:53    TPro  5.5<L>  /  Alb  1.9<L>  /  TBili  0.4  /  DBili  x   /  AST  20  /  ALT  18  /  AlkPhos  57  03-20      Culture Data    Culture - Blood (collected 17 Mar 2019 14:27)  Source: .Blood Blood  Preliminary Report (18 Mar 2019 15:00):    No growth to date.    Culture - Blood (collected 17 Mar 2019 14:27)  Source: .Blood Blood  Preliminary Report (18 Mar 2019 15:00):    No growth to date.    Culture - Urine (collected 15 Mar 2019 12:31)  Source: .Urine Clean Catch (Midstream)  Final Report (16 Mar 2019 12:02):    No growth    RADIOLOGY:  CT Abdomen and Pelvis w/ Oral Cont and w/ IV Cont (03.16.19 @ 17:07) >  FINDINGS:  The lung bases are clear and there is no evidence of a pleural effusion.    There is a small amount of ascites which is a new finding since the prior   examination.    The liver and gallbladder demonstrate no abnormality. The spleen is not   enlarged and demonstrates no focal abnormality. The pancreatic contour is   unremarkable without evidence of mass, inflammation or ductal dilatation.    The adrenal glands demonstrate normal size and contour.    Small cyst in the upper pole of the right kidney. The right kidney is   otherwise unremarkable. The left kidney is unremarkable.    Small hiatus hernia.    No evidence of retroperitoneal or pelvic lymphadenopathy.    The bladder is unremarkable. No evidence of pelvic mass.    There is been no significant change in the appearance of the sigmoid   colon since the prior study. Inflammatory changes are noted in the   pericolonic fat at this site. No evidence of abscess. No evidence of   pneumoperitoneum.    Degenerative changes in the spine.    IMPRESSION:    Persistent abnormality in the region of the sigmoid colon without change   in appearance since the prior study. There are pericolonic inflammatory   changes in this region suggesting diverticulitis. No evidence of   associated abscess. There is now a small amount of ascites in the upper   abdomen adjacent to the liver which is a new finding since the prior   examination..        US Abdomen Complete (03.15.19 @ 09:49) >    FINDINGS:    Liver: Within normal limits.    Bile ducts: Normal caliber. Common bile duct measures 5 mm.     Gallbladder: Within normal limits.        Pancreas: Visualized portions are within normal limits.    Spleen: 9 cm. Within normal limits.    Right kidney: 10 cm. No hydronephrosis. Suggestion of a right-sided   extrarenal pelvis.    Left kidney: 10 cm.  No hydronephrosis.        Ascites: None.    Aorta and IVC: Visualized portions are within normal limits.    IMPRESSION: No gallstones or kidney stones.     CT Abdomen and Pelvis w/ Oral Cont and w/ IV Cont (03.11.19 @ 16:04) >  Impression:    CT findings as discussed which may reflect sigmoid diverticulitis.  Cannot exclude underlying transient mucosal mass or underlying neoplasm.  Recommend further evaluation with colonoscopy if this has not recently   been performed.  This finding was discussed with Dr. Lin at the time of   interpretation on 3/11/2019.        Assessment :   79 year old female hx of diverticulitis , last episode in Dec 2017, admitted with recurrent sigmoid diverticulitis not improved on po antibiotics x 6 days , with worsening abdominal pain and low grade fevers. Unfortunately no blood cs done . Now with increased WBC, low grade fever and worsening pain. Repeat ct scan of abdomen did not show any changes in the inflammatory reaction or abscess development .    She is POD # 1 . s/p left sigmoid colon resection with Florez's procedure     Plan :   - will continue with  Meropenem 1 gram q 8 hours pending cs results    - keep NPO   - incentive spirometry   - trend cbc  - increase activity   - colostomy teaching     D/W with Dr. Spencer and Dr. Contreras     Continue with present regime .  Appropriate use of antibiotics and adverse effects reviewed.    I have discussed the above plan of care with patient and her  in detail. They expressed understanding of the treatment plan . Risks, benefits and alternatives discussed in detail. I have asked if they have any questions or concerns and appropriately addressed them to the best of my ability .      > 35 minutes spent in direct patient care reviewing  the notes, lab data/ imaging , discussion with multidisciplinary team. All questions were addressed and answered to the best of my capacity .    Thank you for allowing me to participate in the care of your patient .        Phoebe Urban MD  [phone]

## 2019-03-20 NOTE — PROGRESS NOTE ADULT - ASSESSMENT
s/p ex carolina, jonah's      oob to chair      pain control      d/c jed       cont NGT for now       cont abx

## 2019-03-20 NOTE — PROGRESS NOTE ADULT - SUBJECTIVE AND OBJECTIVE BOX
pt seen  very emotional  c/o pain  -n-v  ICU Vital Signs Last 24 Hrs  T(C): 37.3 (20 Mar 2019 07:59), Max: 37.4 (19 Mar 2019 20:24)  T(F): 99.2 (20 Mar 2019 07:59), Max: 99.4 (19 Mar 2019 20:24)  HR: 64 (20 Mar 2019 07:59) (64 - 100)  BP: 122/72 (20 Mar 2019 07:59) (114/67 - 151/80)  BP(mean): --  ABP: --  ABP(mean): --  RR: 18 (20 Mar 2019 07:59) (11 - 23)  SpO2: 98% (20 Mar 2019 07:59) (92% - 98%)  gen-NAD  resp-clear   abd-soft ND, dressing mildly saturated, ostomy pink                          10.9   13.63 )-----------( 298      ( 20 Mar 2019 07:53 )             32.3   03-20    145  |  114<H>  |  11  ----------------------------<  185<H>  3.8   |  23  |  0.58    Ca    7.5<L>      20 Mar 2019 07:53    TPro  5.5<L>  /  Alb  1.9<L>  /  TBili  0.4  /  DBili  x   /  AST  20  /  ALT  18  /  AlkPhos  57  03-20

## 2019-03-21 LAB
ANION GAP SERPL CALC-SCNC: 6 MMOL/L — SIGNIFICANT CHANGE UP (ref 5–17)
BASOPHILS # BLD AUTO: 0.02 K/UL — SIGNIFICANT CHANGE UP (ref 0–0.2)
BASOPHILS NFR BLD AUTO: 0.1 % — SIGNIFICANT CHANGE UP (ref 0–2)
BUN SERPL-MCNC: 12 MG/DL — SIGNIFICANT CHANGE UP (ref 7–23)
CALCIUM SERPL-MCNC: 7.6 MG/DL — LOW (ref 8.5–10.1)
CHLORIDE SERPL-SCNC: 115 MMOL/L — HIGH (ref 96–108)
CO2 SERPL-SCNC: 27 MMOL/L — SIGNIFICANT CHANGE UP (ref 22–31)
CREAT SERPL-MCNC: 0.58 MG/DL — SIGNIFICANT CHANGE UP (ref 0.5–1.3)
EOSINOPHIL # BLD AUTO: 0 K/UL — SIGNIFICANT CHANGE UP (ref 0–0.5)
EOSINOPHIL NFR BLD AUTO: 0 % — SIGNIFICANT CHANGE UP (ref 0–6)
GLUCOSE SERPL-MCNC: 167 MG/DL — HIGH (ref 70–99)
HCT VFR BLD CALC: 30.5 % — LOW (ref 34.5–45)
HGB BLD-MCNC: 10.3 G/DL — LOW (ref 11.5–15.5)
IMM GRANULOCYTES NFR BLD AUTO: 0.5 % — SIGNIFICANT CHANGE UP (ref 0–1.5)
LYMPHOCYTES # BLD AUTO: 0.87 K/UL — LOW (ref 1–3.3)
LYMPHOCYTES # BLD AUTO: 6.5 % — LOW (ref 13–44)
MAGNESIUM SERPL-MCNC: 2.1 MG/DL — SIGNIFICANT CHANGE UP (ref 1.6–2.6)
MCHC RBC-ENTMCNC: 31.6 PG — SIGNIFICANT CHANGE UP (ref 27–34)
MCHC RBC-ENTMCNC: 33.8 GM/DL — SIGNIFICANT CHANGE UP (ref 32–36)
MCV RBC AUTO: 93.6 FL — SIGNIFICANT CHANGE UP (ref 80–100)
MONOCYTES # BLD AUTO: 0.73 K/UL — SIGNIFICANT CHANGE UP (ref 0–0.9)
MONOCYTES NFR BLD AUTO: 5.4 % — SIGNIFICANT CHANGE UP (ref 2–14)
NEUTROPHILS # BLD AUTO: 11.79 K/UL — HIGH (ref 1.8–7.4)
NEUTROPHILS NFR BLD AUTO: 87.5 % — HIGH (ref 43–77)
NRBC # BLD: 0 /100 WBCS — SIGNIFICANT CHANGE UP (ref 0–0)
PHOSPHATE SERPL-MCNC: 0.6 MG/DL — CRITICAL LOW (ref 2.5–4.5)
PLATELET # BLD AUTO: 301 K/UL — SIGNIFICANT CHANGE UP (ref 150–400)
POTASSIUM SERPL-MCNC: 3.7 MMOL/L — SIGNIFICANT CHANGE UP (ref 3.5–5.3)
POTASSIUM SERPL-SCNC: 3.7 MMOL/L — SIGNIFICANT CHANGE UP (ref 3.5–5.3)
RBC # BLD: 3.26 M/UL — LOW (ref 3.8–5.2)
RBC # FLD: 13.1 % — SIGNIFICANT CHANGE UP (ref 10.3–14.5)
SODIUM SERPL-SCNC: 148 MMOL/L — HIGH (ref 135–145)
WBC # BLD: 13.48 K/UL — HIGH (ref 3.8–10.5)
WBC # FLD AUTO: 13.48 K/UL — HIGH (ref 3.8–10.5)

## 2019-03-21 RX ORDER — SODIUM CHLORIDE 9 MG/ML
1000 INJECTION, SOLUTION INTRAVENOUS
Qty: 0 | Refills: 0 | Status: DISCONTINUED | OUTPATIENT
Start: 2019-03-21 | End: 2019-03-23

## 2019-03-21 RX ORDER — POTASSIUM PHOSPHATE, MONOBASIC POTASSIUM PHOSPHATE, DIBASIC 236; 224 MG/ML; MG/ML
15 INJECTION, SOLUTION INTRAVENOUS
Qty: 0 | Refills: 0 | Status: COMPLETED | OUTPATIENT
Start: 2019-03-21 | End: 2019-03-21

## 2019-03-21 RX ADMIN — SODIUM CHLORIDE 75 MILLILITER(S): 9 INJECTION, SOLUTION INTRAVENOUS at 11:54

## 2019-03-21 RX ADMIN — MEROPENEM 100 MILLIGRAM(S): 1 INJECTION INTRAVENOUS at 08:08

## 2019-03-21 RX ADMIN — Medication 25 MICROGRAM(S): at 08:08

## 2019-03-21 RX ADMIN — OXYCODONE HYDROCHLORIDE 10 MILLIGRAM(S): 5 TABLET ORAL at 19:10

## 2019-03-21 RX ADMIN — Medication 0.25 MILLIGRAM(S): at 21:20

## 2019-03-21 RX ADMIN — POTASSIUM PHOSPHATE, MONOBASIC POTASSIUM PHOSPHATE, DIBASIC 62.5 MILLIMOLE(S): 236; 224 INJECTION, SOLUTION INTRAVENOUS at 11:54

## 2019-03-21 RX ADMIN — Medication 0.25 MILLIGRAM(S): at 02:11

## 2019-03-21 RX ADMIN — ATORVASTATIN CALCIUM 10 MILLIGRAM(S): 80 TABLET, FILM COATED ORAL at 21:16

## 2019-03-21 RX ADMIN — OXYCODONE HYDROCHLORIDE 10 MILLIGRAM(S): 5 TABLET ORAL at 18:17

## 2019-03-21 RX ADMIN — ENOXAPARIN SODIUM 40 MILLIGRAM(S): 100 INJECTION SUBCUTANEOUS at 11:53

## 2019-03-21 RX ADMIN — Medication 1 TABLET(S): at 11:53

## 2019-03-21 RX ADMIN — MEROPENEM 100 MILLIGRAM(S): 1 INJECTION INTRAVENOUS at 21:16

## 2019-03-21 RX ADMIN — POTASSIUM PHOSPHATE, MONOBASIC POTASSIUM PHOSPHATE, DIBASIC 62.5 MILLIMOLE(S): 236; 224 INJECTION, SOLUTION INTRAVENOUS at 21:16

## 2019-03-21 RX ADMIN — PANTOPRAZOLE SODIUM 40 MILLIGRAM(S): 20 TABLET, DELAYED RELEASE ORAL at 12:20

## 2019-03-21 RX ADMIN — Medication 0.25 MILLIGRAM(S): at 10:34

## 2019-03-21 RX ADMIN — MEROPENEM 100 MILLIGRAM(S): 1 INJECTION INTRAVENOUS at 13:40

## 2019-03-21 RX ADMIN — Medication 1 TABLET(S): at 17:19

## 2019-03-21 RX ADMIN — Medication 1 TABLET(S): at 08:16

## 2019-03-21 NOTE — PROGRESS NOTE ADULT - ASSESSMENT
This is a 79 F comes with hypothyroidism, HLD, hx of diverticulitis, who presented to the ED with abdominal pain for 1 week, admitted for sigmoid diverticulitis. Patient now on Meropenem (Day 4), s/p  POD#2 Florez procedure.     ACUTE SIGMOID DIVERTICULITIS WITHOUT PERFORATION  -CT abdomen: persistent abnormality in the region of the sigmoid colon, pericolonic inflammatory   changes in this region suggesting diverticulitis   -POD#2 Florez procedure.   -WBC mildly elevated,  afebrile, Tylenol for fever   - currently NPO, surgery Dr. Spencer following, F/u recs   -IVF changed to IVF: D5/1/2NS @ 75, BS checks Q8 hrs while NPO  -Continue with Meropenum (Day 4), ID DrBonnie (WINIFRED Urban) following, f/u recs   -Pain management: Mild: Oxycodone IR 5 mg Q4 Moderate: Oxycodone IR 10 mg Q4, Severe: morphine 4 IV Q4   -Zofran for nausea   -Blood culture prelim NGTD   -Surgical path: prelim NGTD   -Continue with protonix 40 IV daily   -GI  Dr. Burgos, f/u recs     Hypophosphatemia:    -Phosphate .6 this AM, Kphosphate IV ordered  -F/u AM labs     HYPOTHYROID  Chronic issue. Continue synthroid.     DYSLIPIDEMIA  Chronic issue. Home medication pravastatin, atorvastatin while inpatient (therapeutic interchange).       Need for prophylactic measures:   -Patient with b/l LE swelling, nontender, if worsening, will consider dopplers   -advance care planning discussed- pt to provide health care proxy w forms w appointed health care agent documented  -Continue home medications of restasis and systane for dry eye  -Lovenox for DVT prophylaxis   BB IMPROVE VTE Individual Risk Assessment          RISK                                                          Points    [  ] Previous VTE                                                3  [  ] Thrombophilia                                             2  [  ] Lower limb paralysis                                   2        (unable to hold up >15 seconds)    [  ] Current Cancer                                            2         (within 6 months)  [  ] Immobilization > 24 hrs                              1  [  ] ICU/CCU stay > 24 hours                            1  [ 1 ] Age > 60                                                    1    IMPROVE VTE Score __1_______

## 2019-03-21 NOTE — PROGRESS NOTE ADULT - SUBJECTIVE AND OBJECTIVE BOX
Patient is a 79y old  Female who presents with a chief complaint of diverticulitis (20 Mar 2019 09:47)      INTERVAL HPI/OVERNIGHT EVENTS: Patient seen and examined at bedside. POD#2 Florez procedure. Patient states abdominal pain is present but improved from yesterday, Currently NPO with NG tube. Millard d/c yesterday. Anxious about situation.       T(C): 36.7 (03-21-19 @ 07:30), Max: 37.3 (03-20-19 @ 16:07)  HR: 77 (03-21-19 @ 07:30) (62 - 81)  BP: 144/79 (03-21-19 @ 07:30) (110/52 - 144/79)  RR: 16 (03-21-19 @ 07:30) (16 - 18)  SpO2: 99% (03-21-19 @ 07:30) (95% - 99%)  Wt(kg): --  I&O's Summary    20 Mar 2019 07:01  -  21 Mar 2019 07:00  --------------------------------------------------------  IN: 0 mL / OUT: 660 mL / NET: -660 mL    21 Mar 2019 07:01  -  21 Mar 2019 12:07  --------------------------------------------------------  IN: 0 mL / OUT: 300 mL / NET: -300 mL        LABS:                        10.3   13.48 )-----------( 301      ( 21 Mar 2019 06:04 )             30.5     03-21    148<H>  |  115<H>  |  12  ----------------------------<  167<H>  3.7   |  27  |  0.58    Ca    7.6<L>      21 Mar 2019 06:04  Phos  0.6     03-21  Mg     2.1     03-21    TPro  5.5<L>  /  Alb  1.9<L>  /  TBili  0.4  /  DBili  x   /  AST  20  /  ALT  18  /  AlkPhos  57  03-20        CAPILLARY BLOOD GLUCOSE      POCT Blood Glucose.: 138 mg/dL (21 Mar 2019 11:41)  POCT Blood Glucose.: 170 mg/dL (21 Mar 2019 03:50)  POCT Blood Glucose.: 205 mg/dL (20 Mar 2019 16:50)    MEDICATIONS  (STANDING):  atorvastatin 10 milliGRAM(s) Oral at bedtime  dextrose 5% + sodium chloride 0.45%. 1000 milliLiter(s) (75 mL/Hr) IV Continuous <Continuous>  enoxaparin Injectable 40 milliGRAM(s) SubCutaneous every 24 hours  lactobacillus acidophilus 1 Tablet(s) Oral three times a day with meals  levothyroxine 25 MICROGram(s) Oral daily  meropenem  IVPB 1000 milliGRAM(s) IV Intermittent every 8 hours  pantoprazole  Injectable 40 milliGRAM(s) IV Push daily  potassium phosphate IVPB 15 milliMole(s) IV Intermittent two times a day    MEDICATIONS  (PRN):  ALPRAZolam 0.25 milliGRAM(s) Oral every 6 hours PRN anxiety  artificial  tears Solution 1 Drop(s) Both EYES two times a day PRN Dry Eyes  morphine  - Injectable 4 milliGRAM(s) IV Push every 4 hours PRN Severe Pain (7 - 10)  naloxone Injectable 0.1 milliGRAM(s) IV Push every 3 minutes PRN For ANY of the following changes in patient status:  A. RR LESS THAN 10 breaths per minute, B. Oxygen saturation LESS THAN 90%, C. Sedation score of 6  ondansetron Injectable 4 milliGRAM(s) IV Push every 6 hours PRN Nausea and/or Vomiting  oxyCODONE    IR 5 milliGRAM(s) Oral every 4 hours PRN Mild Pain (1 - 3)  oxyCODONE    IR 10 milliGRAM(s) Oral every 4 hours PRN Moderate Pain (4 - 6)      REVIEW OF SYSTEMS:  CONSTITUTIONAL: No fever, weight loss, or fatigue  ENMT:  No throat pain  RESPIRATORY: No cough, wheezing, chills or hemoptysis; No shortness of breath  CARDIOVASCULAR: No chest pain, palpitations, dizziness, or leg swelling  GASTROINTESTINAL: + abdominal pain, no nausea, vomiting, or hematemesis  GENITOURINARY: No dysuria, frequency, hematuria, or incontinence   NEUROLOGICAL: No headaches, memory loss, loss of strength, numbness, or tremors      RADIOLOGY & ADDITIONAL TESTS: NONE     Imaging Personally Reviewed:  [ ] YES  [ ] NO    Consultant(s) Notes Reviewed:  [X ] YES  [ ] NO    PHYSICAL EXAM:  GENERAL: anxious   HEAD: atraumatic, Normocephalic  EYES:  PERRL, conjunctiva and sclera clear  NERVOUS SYSTEM:  Alert & Oriented, Good concentration  CHEST/LUNG: Clear to percussion bilaterally; No rales, rhonchi, wheezing, or rubs  HEART: Regular rate and rhythm; No murmurs, rubs, or gallops  ABDOMEN: +left colostomy bag, surgical dressing dry and intact, RUQ mild tenderness, soft   EXTREMITIES: + edema b/l lower extremity, No clubbing, cyanosis, or calf tenderness, + left arm swelling      Care Discussed with Consultants/Other Providers [ ] YES  [ ] NO

## 2019-03-21 NOTE — PROGRESS NOTE ADULT - SUBJECTIVE AND OBJECTIVE BOX
pt seen  much better today  pain better improved  more relaxed  ambulating  ICU Vital Signs Last 24 Hrs  T(C): 36.7 (21 Mar 2019 07:30), Max: 37.3 (20 Mar 2019 16:07)  T(F): 98 (21 Mar 2019 07:30), Max: 99.1 (20 Mar 2019 16:07)  HR: 77 (21 Mar 2019 07:30) (62 - 81)  BP: 144/79 (21 Mar 2019 07:30) (110/52 - 144/79)  BP(mean): --  ABP: --  ABP(mean): --  RR: 16 (21 Mar 2019 07:30) (16 - 18)  SpO2: 99% (21 Mar 2019 07:30) (95% - 99%)  gen-NAD  resp-clear  abd-soft, mild tenderness, ostomy pink, wound clean, repacked                          10.3   13.48 )-----------( 301      ( 21 Mar 2019 06:04 )             30.5   03-21    148<H>  |  115<H>  |  12  ----------------------------<  167<H>  3.7   |  27  |  0.58    Ca    7.6<L>      21 Mar 2019 06:04  Phos  0.6     03-21  Mg     2.1     03-21    TPro  5.5<L>  /  Alb  1.9<L>  /  TBili  0.4  /  DBili  x   /  AST  20  /  ALT  18  /  AlkPhos  57  03-20

## 2019-03-21 NOTE — PROGRESS NOTE ADULT - ASSESSMENT
80 yo s/p jonah's for failed medical management diverticulitis        -clamp NGT x 24 hrs        -replace phosphorus        -encourage ambulation

## 2019-03-21 NOTE — PROGRESS NOTE ADULT - SUBJECTIVE AND OBJECTIVE BOX
ID progress note     Name: MARIO HARRY  Age: 79y  Gender: Female  MRN: [id number]    Interval History-- Events noted, POD # 2 s/p Florez's procedure with left colostomy , feels very tired. Still with NGT, No air in colostomy bag   Notes reviewed    Past Medical History--  Diverticulosis  Hyperlipidemia  Hypothyroidism  H/O arthroscopic knee surgery  No significant past surgical history      For details regarding the patient's social history, family history, and other miscellaneous elements, please refer the initial infectious diseases consultation and/or the admitting history and physical examination for this admission.    Allergies--  Allergies    clindamycin (Stomach Upset)    Intolerances        Medications--  Antibiotics:  meropenem  IVPB 1000 milliGRAM(s) IV Intermittent every 8 hours    Immunologic:    Other:  ALPRAZolam PRN  artificial  tears Solution PRN  atorvastatin  dextrose 5% + sodium chloride 0.45%.  enoxaparin Injectable  lactobacillus acidophilus  levothyroxine  morphine  - Injectable PRN  naloxone Injectable PRN  ondansetron Injectable PRN  oxyCODONE    IR PRN  oxyCODONE    IR PRN  pantoprazole  Injectable  potassium phosphate IVPB      Review of Systems--  A 10-point review of systems was obtained.     Pertinent positives and negatives--  Constitutional: No fevers. No Chills. No Rigors.   Cardiovascular: No chest pain. No palpitations.  Respiratory: Pos for cough , no  shortness of breath.  Gastrointestinal: Positive for  nausea or vomiting. No diarrhea or constipation.   Psychiatric: Pleasant. Appropriate affect.    Review of systems otherwise negative except as previously noted.    Physical Examination--  Vital Signs: T(F): 98.2 (03-21-19 @ 15:46), Max: 98.2 (03-21-19 @ 15:46)  HR: 78 (03-21-19 @ 15:46)  BP: 123/70 (03-21-19 @ 15:46)  RR: 16 (03-21-19 @ 15:46)  SpO2: 97% (03-21-19 @ 15:46)  Wt(kg): --  General: Nontoxic-appearing Female in no acute distress.  HEENT: AT/NC. PERRL. EOMI. Anicteric. Conjunctiva pink and moist. Oropharynx clear. Dentition fair.  Neck: Not rigid. No sense of mass.  Nodes: None palpable.  Lungs: Clear bilaterally without rales, wheezing or rhonchi  Heart: Regular rate and rhythm. No Murmur. No rub. No gallop. No palpable thrill.  Abdomen: Bowel sounds hypoactive . Soft. mild distended. Incisional tender. No sense of mass. No organomegaly. left colostomy- brown liquid , NGT + bilious drainage   Back: No spinal tenderness. No costovertebral angle tenderness.   Extremities: No cyanosis or clubbing. No edema.   Skin: Warm. Dry. Good turgor. No rash. No vasculitic stigmata.  Psychiatric: Appropriate affect and mood for situation.         Laboratory Studies--  CBC                        10.3   13.48 )-----------( 301      ( 21 Mar 2019 06:04 )             30.5       Chemistries  03-21    148<H>  |  115<H>  |  12  ----------------------------<  167<H>  3.7   |  27  |  0.58    Ca    7.6<L>      21 Mar 2019 06:04  Phos  0.6     03-21  Mg     2.1     03-21    TPro  5.5<L>  /  Alb  1.9<L>  /  TBili  0.4  /  DBili  x   /  AST  20  /  ALT  18  /  AlkPhos  57  03-20      Culture Data    Culture - Surgical Swab (collected 20 Mar 2019 01:10)  Source: .Surgical Swab peritoneal fluid  Preliminary Report (20 Mar 2019 19:50):    No growth to date.    Culture - Blood (collected 17 Mar 2019 14:27)  Source: .Blood Blood  Preliminary Report (18 Mar 2019 15:00):    No growth to date.    Culture - Blood (collected 17 Mar 2019 14:27)  Source: .Blood Blood  Preliminary Report (18 Mar 2019 15:00):    No growth to date.    Culture - Urine (collected 15 Mar 2019 12:31)  Source: .Urine Clean Catch (Midstream)  Final Report (16 Mar 2019 12:02):    No growth    RADIOLOGY:  CT Abdomen and Pelvis w/ Oral Cont and w/ IV Cont (03.16.19 @ 17:07) >  FINDINGS:  The lung bases are clear and there is no evidence of a pleural effusion.    There is a small amount of ascites which is a new finding since the prior   examination.    The liver and gallbladder demonstrate no abnormality. The spleen is not   enlarged and demonstrates no focal abnormality. The pancreatic contour is   unremarkable without evidence of mass, inflammation or ductal dilatation.    The adrenal glands demonstrate normal size and contour.    Small cyst in the upper pole of the right kidney. The right kidney is   otherwise unremarkable. The left kidney is unremarkable.    Small hiatus hernia.    No evidence of retroperitoneal or pelvic lymphadenopathy.    The bladder is unremarkable. No evidence of pelvic mass.    There is been no significant change in the appearance of the sigmoid   colon since the prior study. Inflammatory changes are noted in the   pericolonic fat at this site. No evidence of abscess. No evidence of   pneumoperitoneum.    Degenerative changes in the spine.    IMPRESSION:    Persistent abnormality in the region of the sigmoid colon without change   in appearance since the prior study. There are pericolonic inflammatory   changes in this region suggesting diverticulitis. No evidence of   associated abscess. There is now a small amount of ascites in the upper   abdomen adjacent to the liver which is a new finding since the prior   examination..        Assessment--    79 year old female hx of diverticulitis , last episode in Dec 2017, admitted with recurrent sigmoid diverticulitis not improved on po antibiotics x 6 days , with worsening abdominal pain and low grade fevers. Unfortunately no blood cs done . Now with increased WBC, low grade fever and worsening pain. Repeat ct scan of abdomen did not show any changes in the inflammatory reaction or abscess development .    She is POD # 2 . s/p left sigmoid colon resection with Florez's procedure     Plan :   - will continue with  Meropenem 1 gram q 8 hours pending cs results    - keep NPO   - incentive spirometry   - trend cbc  - increase activity   - colostomy teaching     D/W with Dr. Spencer and Dr. Contreras     Continue with present regime .  Appropriate use of antibiotics and adverse effects reviewed.      I have discussed the above plan of care with patient and family in detail. They expressed understanding of the treatment plan . Risks, benefits and alternatives discussed in detail. I have asked if they have any questions or concerns and appropriately addressed them to the best of my ability .      > 35 minutes spent in direct patient care reviewing  the notes, lab data/ imaging , discussion with multidisciplinary team. All questions were addressed and answered to the best of my capacity .    Thank you for allowing me to participate in the care of your patient .        Phoebe Urban MD  [phone]

## 2019-03-22 LAB
ANION GAP SERPL CALC-SCNC: 6 MMOL/L — SIGNIFICANT CHANGE UP (ref 5–17)
BUN SERPL-MCNC: 8 MG/DL — SIGNIFICANT CHANGE UP (ref 7–23)
CALCIUM SERPL-MCNC: 7.6 MG/DL — LOW (ref 8.5–10.1)
CHLORIDE SERPL-SCNC: 108 MMOL/L — SIGNIFICANT CHANGE UP (ref 96–108)
CO2 SERPL-SCNC: 29 MMOL/L — SIGNIFICANT CHANGE UP (ref 22–31)
CREAT SERPL-MCNC: 0.5 MG/DL — SIGNIFICANT CHANGE UP (ref 0.5–1.3)
CULTURE RESULTS: SIGNIFICANT CHANGE UP
CULTURE RESULTS: SIGNIFICANT CHANGE UP
GLUCOSE SERPL-MCNC: 109 MG/DL — HIGH (ref 70–99)
HCT VFR BLD CALC: 30 % — LOW (ref 34.5–45)
HGB BLD-MCNC: 10 G/DL — LOW (ref 11.5–15.5)
MAGNESIUM SERPL-MCNC: 1.9 MG/DL — SIGNIFICANT CHANGE UP (ref 1.6–2.6)
MCHC RBC-ENTMCNC: 31.3 PG — SIGNIFICANT CHANGE UP (ref 27–34)
MCHC RBC-ENTMCNC: 33.3 GM/DL — SIGNIFICANT CHANGE UP (ref 32–36)
MCV RBC AUTO: 94 FL — SIGNIFICANT CHANGE UP (ref 80–100)
NRBC # BLD: 0 /100 WBCS — SIGNIFICANT CHANGE UP (ref 0–0)
PHOSPHATE SERPL-MCNC: 2 MG/DL — LOW (ref 2.5–4.5)
PLATELET # BLD AUTO: 273 K/UL — SIGNIFICANT CHANGE UP (ref 150–400)
POTASSIUM SERPL-MCNC: 3.5 MMOL/L — SIGNIFICANT CHANGE UP (ref 3.5–5.3)
POTASSIUM SERPL-SCNC: 3.5 MMOL/L — SIGNIFICANT CHANGE UP (ref 3.5–5.3)
RBC # BLD: 3.19 M/UL — LOW (ref 3.8–5.2)
RBC # FLD: 13.1 % — SIGNIFICANT CHANGE UP (ref 10.3–14.5)
SODIUM SERPL-SCNC: 143 MMOL/L — SIGNIFICANT CHANGE UP (ref 135–145)
SPECIMEN SOURCE: SIGNIFICANT CHANGE UP
SPECIMEN SOURCE: SIGNIFICANT CHANGE UP
WBC # BLD: 9.39 K/UL — SIGNIFICANT CHANGE UP (ref 3.8–10.5)
WBC # FLD AUTO: 9.39 K/UL — SIGNIFICANT CHANGE UP (ref 3.8–10.5)

## 2019-03-22 RX ORDER — ERTAPENEM SODIUM 1 G/1
1000 INJECTION, POWDER, LYOPHILIZED, FOR SOLUTION INTRAMUSCULAR; INTRAVENOUS EVERY 24 HOURS
Qty: 0 | Refills: 0 | Status: COMPLETED | OUTPATIENT
Start: 2019-03-23 | End: 2019-03-25

## 2019-03-22 RX ORDER — POTASSIUM PHOSPHATE, MONOBASIC POTASSIUM PHOSPHATE, DIBASIC 236; 224 MG/ML; MG/ML
15 INJECTION, SOLUTION INTRAVENOUS ONCE
Qty: 0 | Refills: 0 | Status: COMPLETED | OUTPATIENT
Start: 2019-03-22 | End: 2019-03-22

## 2019-03-22 RX ORDER — POTASSIUM PHOSPHATE, MONOBASIC POTASSIUM PHOSPHATE, DIBASIC 236; 224 MG/ML; MG/ML
15 INJECTION, SOLUTION INTRAVENOUS ONCE
Qty: 0 | Refills: 0 | Status: DISCONTINUED | OUTPATIENT
Start: 2019-03-22 | End: 2019-03-22

## 2019-03-22 RX ORDER — SODIUM,POTASSIUM PHOSPHATES 278-250MG
1 POWDER IN PACKET (EA) ORAL ONCE
Qty: 0 | Refills: 0 | Status: COMPLETED | OUTPATIENT
Start: 2019-03-22 | End: 2019-03-22

## 2019-03-22 RX ADMIN — Medication 1 TABLET(S): at 08:02

## 2019-03-22 RX ADMIN — MEROPENEM 100 MILLIGRAM(S): 1 INJECTION INTRAVENOUS at 21:40

## 2019-03-22 RX ADMIN — PANTOPRAZOLE SODIUM 40 MILLIGRAM(S): 20 TABLET, DELAYED RELEASE ORAL at 13:04

## 2019-03-22 RX ADMIN — Medication 25 MICROGRAM(S): at 05:58

## 2019-03-22 RX ADMIN — Medication 1 TABLET(S): at 17:13

## 2019-03-22 RX ADMIN — ATORVASTATIN CALCIUM 10 MILLIGRAM(S): 80 TABLET, FILM COATED ORAL at 21:40

## 2019-03-22 RX ADMIN — MEROPENEM 100 MILLIGRAM(S): 1 INJECTION INTRAVENOUS at 13:04

## 2019-03-22 RX ADMIN — MEROPENEM 100 MILLIGRAM(S): 1 INJECTION INTRAVENOUS at 05:58

## 2019-03-22 RX ADMIN — Medication 1 TABLET(S): at 17:12

## 2019-03-22 RX ADMIN — POTASSIUM PHOSPHATE, MONOBASIC POTASSIUM PHOSPHATE, DIBASIC 62.5 MILLIMOLE(S): 236; 224 INJECTION, SOLUTION INTRAVENOUS at 10:04

## 2019-03-22 RX ADMIN — OXYCODONE HYDROCHLORIDE 5 MILLIGRAM(S): 5 TABLET ORAL at 21:00

## 2019-03-22 RX ADMIN — OXYCODONE HYDROCHLORIDE 5 MILLIGRAM(S): 5 TABLET ORAL at 19:57

## 2019-03-22 RX ADMIN — Medication 1 TABLET(S): at 13:04

## 2019-03-22 RX ADMIN — ENOXAPARIN SODIUM 40 MILLIGRAM(S): 100 INJECTION SUBCUTANEOUS at 13:04

## 2019-03-22 RX ADMIN — SODIUM CHLORIDE 75 MILLILITER(S): 9 INJECTION, SOLUTION INTRAVENOUS at 13:05

## 2019-03-22 NOTE — PROGRESS NOTE ADULT - ASSESSMENT
78 yo female s/p Mandeep's       -cont NPO for now        -may have some clears        -encourage ambulation         -cont abx

## 2019-03-22 NOTE — PROGRESS NOTE ADULT - SUBJECTIVE AND OBJECTIVE BOX
ID progress note     Name: MARIO HARRY  Age: 79y  Gender: Female  MRN: [id number]    Interval History-- Events noted, doing well, afebrile , POD # 3 , started on clear liquids , patient very nervous wants ot take it slowly, still with NGT   Notes reviewed    Past Medical History--  Diverticulosis  Hyperlipidemia  Hypothyroidism  H/O arthroscopic knee surgery  No significant past surgical history      For details regarding the patient's social history, family history, and other miscellaneous elements, please refer the initial infectious diseases consultation and/or the admitting history and physical examination for this admission.    Allergies--  Allergies    clindamycin (Stomach Upset)    Intolerances        Medications--  Antibiotics:  meropenem  IVPB 1000 milliGRAM(s) IV Intermittent every 8 hours    Immunologic:    Other:  ALPRAZolam PRN  artificial  tears Solution PRN  atorvastatin  dextrose 5% + sodium chloride 0.45%.  enoxaparin Injectable  lactobacillus acidophilus  levothyroxine  morphine  - Injectable PRN  naloxone Injectable PRN  ondansetron Injectable PRN  oxyCODONE    IR PRN  oxyCODONE    IR PRN  pantoprazole  Injectable  potassium acid phosphate/sodium acid phosphate tablet (K-PHOS No. 2)      Review of Systems--  A 10-point review of systems was obtained.     Pertinent positives and negatives--  Constitutional: No fevers. No Chills. No Rigors.   Cardiovascular: No chest pain. No palpitations.  Respiratory: No shortness of breath. No cough.  Gastrointestinal: No nausea or vomiting. No diarrhea or constipation.   Psychiatric: Pleasant. Appropriate affect.    Review of systems otherwise negative except as previously noted.    Physical Examination--  Vital Signs: T(F): 98 (03-22-19 @ 15:26), Max: 98.4 (03-22-19 @ 12:40)  HR: 75 (03-22-19 @ 15:26)  BP: 132/71 (03-22-19 @ 15:26)  RR: 18 (03-22-19 @ 15:26)  SpO2: 95% (03-22-19 @ 15:26)  Wt(kg): --  General: Nontoxic-appearing Female in no acute distress.  HEENT: AT/NC. PERRL. EOMI. Anicteric. Conjunctiva pink and moist. Oropharynx clear. Dentition fair.  Neck: Not rigid. No sense of mass.  Nodes: None palpable.  Lungs: Clear bilaterally without rales, wheezing or rhonchi  Heart: Regular rate and rhythm. No Murmur. No rub. No gallop. No palpable thrill.  Abdomen: Bowel sounds hypoactive . Soft. mild distended. Incisional tender. No sense of mass. No organomegaly. left colostomy- brown liquid , NGT + bilious drainage   Back: No spinal tenderness. No costovertebral angle tenderness.   Extremities: No cyanosis or clubbing. No edema.   Skin: Warm. Dry. Good turgor. No rash. No vasculitic stigmata.  Psychiatric: Appropriate affect and mood for situation.         Laboratory Studies--  CBC                        10.0   9.39  )-----------( 273      ( 22 Mar 2019 06:45 )             30.0       Chemistries  03-22    143  |  108  |  8   ----------------------------<  109<H>  3.5   |  29  |  0.50    Ca    7.6<L>      22 Mar 2019 06:45  Phos  2.0     03-22  Mg     1.9     03-22        Culture Data    Culture - Surgical Swab (collected 20 Mar 2019 01:10)  Source: .Surgical Swab peritoneal fluid  Preliminary Report (20 Mar 2019 19:50):    No growth to date.    Culture - Blood (collected 17 Mar 2019 14:27)  Source: .Blood Blood  Final Report (22 Mar 2019 15:00):    No growth at 5 days.    Culture - Blood (collected 17 Mar 2019 14:27)  Source: .Blood Blood  Final Report (22 Mar 2019 15:00):    No growth at 5 days.        RADIOLOGY:    CT Abdomen and Pelvis w/ Oral Cont and w/ IV Cont (03.16.19 @ 17:07) >  IMPRESSION:    Persistent abnormality in the region of the sigmoid colon without change   in appearance since the prior study. There are pericolonic inflammatory   changes in this region suggesting diverticulitis. No evidence of   associated abscess. There is now a small amount of ascites in the upper   abdomen adjacent to the liver which is a new finding since the prior   examination..        Assessment--    79 year old female hx of diverticulitis , last episode in Dec 2017, admitted with recurrent sigmoid diverticulitis not improved on po antibiotics x 6 days , with worsening abdominal pain and low grade fevers. Unfortunately no blood cs done . Now with increased WBC, low grade fever and worsening pain. Repeat ct scan of abdomen did not show any changes in the inflammatory reaction or abscess development .    She is POD # 3 . s/p left sigmoid colon resection with Florez's procedure     Plan :   - will change to IV Invanz 1 gram daily from am  x 3 more days then dc   - discussed with Dr. Spencer will dc NGT  - incentive spirometry   - trend cbc  - increase activity   - colostomy teaching     D/W with Dr. Spencer and Dr. Contreras     Continue with present regime .  Appropriate use of antibiotics and adverse effects reviewed.    I have discussed the above plan of care with patient/family in detail. They expressed understanding of the treatment plan . Risks, benefits and alternatives discussed in detail. I have asked if they have any questions or concerns and appropriately addressed them to the best of my ability .      > 35 minutes spent in direct patient care reviewing  the notes, lab data/ imaging , discussion with multidisciplinary team. All questions were addressed and answered to the best of my capacity .    Thank you for allowing me to participate in the care of your patient .        Phoebe Urban MD  [phone]

## 2019-03-22 NOTE — PROGRESS NOTE ADULT - SUBJECTIVE AND OBJECTIVE BOX
SURGERY            79y    SUBJECTIVE:   Patient seen at bedside, states she slept well overnight and feels more comfortable today, NGT clamped since 12pm yesterday.  No overnight events, no complaints noted and pain is controlled at this time.   Patient admits to bowel movement, ambulation and voiding  Patient denies any headaches, chest pain, shortness of breath, nausea, vomiting, fever, chills, weakness, dysuria  Patient A+Ox3 in NAD at time of visit.    OBJECTIVE:   T(C): 36.8 (03-22-19 @ 07:53), Max: 36.8 (03-21-19 @ 15:46)  HR: 68 (03-22-19 @ 07:53) (64 - 78)  BP: 125/75 (03-22-19 @ 07:53) (123/70 - 143/78)  RR: 18 (03-22-19 @ 07:53) (16 - 18)  SpO2: 96% (03-22-19 @ 07:53) (96% - 97%)  Wt(kg): --  CAPILLARY BLOOD GLUCOSE      POCT Blood Glucose.: 108 mg/dL (22 Mar 2019 08:01)  POCT Blood Glucose.: 119 mg/dL (22 Mar 2019 00:29)  POCT Blood Glucose.: 138 mg/dL (21 Mar 2019 11:41)    I&O's Detail    21 Mar 2019 07:01  -  22 Mar 2019 07:00  --------------------------------------------------------  IN:    dextrose 5% + sodium chloride 0.45%.: 1875 mL    Solution: 150 mL    Solution: 438 mL  Total IN: 2463 mL    OUT:    Colostomy: 10 mL    Nasoenteral Tube: 200 mL    Voided: 950 mL  Total OUT: 1160 mL    Total NET: 1303 mL          Physical exam:  General: A+O x 3 in NAD  HEENT: PERRLA, EOM intact, NGT present  Neck: trachea midline    Abdomen: soft mildly distended, mild tenderness to palpation, mildly tympanic, BS x 4, no guarding noted, ostomy pink with no air in bag, dressing intact  Extremities: no edema noted, warm,  no calf tenderness     MEDICATIONS  (STANDING):  atorvastatin 10 milliGRAM(s) Oral at bedtime  dextrose 5% + sodium chloride 0.45%. 1000 milliLiter(s) (75 mL/Hr) IV Continuous <Continuous>  enoxaparin Injectable 40 milliGRAM(s) SubCutaneous every 24 hours  lactobacillus acidophilus 1 Tablet(s) Oral three times a day with meals  levothyroxine 25 MICROGram(s) Oral daily  meropenem  IVPB 1000 milliGRAM(s) IV Intermittent every 8 hours  pantoprazole  Injectable 40 milliGRAM(s) IV Push daily    MEDICATIONS  (PRN):  ALPRAZolam 0.25 milliGRAM(s) Oral every 6 hours PRN anxiety  artificial  tears Solution 1 Drop(s) Both EYES two times a day PRN Dry Eyes  morphine  - Injectable 4 milliGRAM(s) IV Push every 4 hours PRN Severe Pain (7 - 10)  naloxone Injectable 0.1 milliGRAM(s) IV Push every 3 minutes PRN For ANY of the following changes in patient status:  A. RR LESS THAN 10 breaths per minute, B. Oxygen saturation LESS THAN 90%, C. Sedation score of 6  ondansetron Injectable 4 milliGRAM(s) IV Push every 6 hours PRN Nausea and/or Vomiting  oxyCODONE    IR 5 milliGRAM(s) Oral every 4 hours PRN Mild Pain (1 - 3)  oxyCODONE    IR 10 milliGRAM(s) Oral every 4 hours PRN Moderate Pain (4 - 6)      LABS:                        10.0   9.39  )-----------( 273      ( 22 Mar 2019 06:45 )             30.0     03-22    143  |  108  |  8   ----------------------------<  109<H>  3.5   |  29  |  0.50    Ca    7.6<L>      22 Mar 2019 06:45  Phos  2.0     03-22  Mg     1.9     03-22            RADIOLOGY & ADDITIONAL STUDIES:    Assessment  Patient is a 79y old  Female who presents with a chief complaint of Recurrent diverticulitis s/p Hartmanns procedure POD 3 with Bowel sounds present, no air in ostomy, denies nausea/vomiting, improved leukocytosis    Plan  - will discuss NGT plan with Dr Spencer, possible removal  - incentive spirometer  - replace K / Phos  - BMP in am  - ambulation  - follow up GI fxn  - dressing change with Dr. Spencer  - continue meropenem  - DVT prophylaxis    Surgical Team Contact Information  Spectralink: Ext: [phone] or [phone]  Pager: [phone]

## 2019-03-22 NOTE — PROGRESS NOTE ADULT - SUBJECTIVE AND OBJECTIVE BOX
pt seen   feeling much better today  less pain  ambulating  voiding  -n-v with NGT clamped  ICU Vital Signs Last 24 Hrs  T(C): 36.8 (22 Mar 2019 07:53), Max: 36.8 (21 Mar 2019 15:46)  T(F): 98.2 (22 Mar 2019 07:53), Max: 98.2 (21 Mar 2019 15:46)  HR: 68 (22 Mar 2019 07:53) (64 - 78)  BP: 125/75 (22 Mar 2019 07:53) (123/70 - 143/78)  BP(mean): --  ABP: --  ABP(mean): --  RR: 18 (22 Mar 2019 07:53) (16 - 18)  SpO2: 96% (22 Mar 2019 07:53) (96% - 97%)  gen-NAD  resp-clear  abd-soft ND, mild tenderness  dressing changed                          10.0   9.39  )-----------( 273      ( 22 Mar 2019 06:45 )             30.0   03-22    143  |  108  |  8   ----------------------------<  109<H>  3.5   |  29  |  0.50    Ca    7.6<L>      22 Mar 2019 06:45  Phos  2.0     03-22  Mg     1.9     03-22

## 2019-03-22 NOTE — PROVIDER CONTACT NOTE (CHANGE IN STATUS NOTIFICATION) - ASSESSMENT
Patient is a 78yo F presents with new colostomy day 4 entire pouch changed BS hypoactive, no gas no stool. Education provided: what to expect during hospital say, opening, closing and burping the pouch, frequent ambulation, Food choices, showering with and without the pouch, Kristina secure start ordered 3/20/19

## 2019-03-22 NOTE — PROGRESS NOTE ADULT - SUBJECTIVE AND OBJECTIVE BOX
Patient is a 79y old  Female who presents with a chief complaint of Recurrent diverticulitis (21 Mar 2019 17:19)      INTERVAL HPI/OVERNIGHT EVENTS: Patient seen and examined at bedside. POD#3 Florez procedure. Patient states abdominal pain has improved. Denies N/V. Currently NPO.       T(C): 36.8 (03-22-19 @ 07:53), Max: 36.8 (03-21-19 @ 15:46)  HR: 68 (03-22-19 @ 07:53) (64 - 78)  BP: 125/75 (03-22-19 @ 07:53) (123/70 - 143/78)  RR: 18 (03-22-19 @ 07:53) (16 - 18)  SpO2: 96% (03-22-19 @ 07:53) (96% - 97%)  Wt(kg): --  I&O's Summary    21 Mar 2019 07:01  -  22 Mar 2019 07:00  --------------------------------------------------------  IN: 2463 mL / OUT: 1160 mL / NET: 1303 mL        LABS:                        10.0   9.39  )-----------( 273      ( 22 Mar 2019 06:45 )             30.0     03-22    143  |  108  |  8   ----------------------------<  109<H>  3.5   |  29  |  0.50    Ca    7.6<L>      22 Mar 2019 06:45  Phos  2.0     03-22  Mg     1.9     03-22          CAPILLARY BLOOD GLUCOSE      POCT Blood Glucose.: 108 mg/dL (22 Mar 2019 08:01)  POCT Blood Glucose.: 119 mg/dL (22 Mar 2019 00:29)  POCT Blood Glucose.: 138 mg/dL (21 Mar 2019 11:41)      MEDICATIONS  (STANDING):  atorvastatin 10 milliGRAM(s) Oral at bedtime  dextrose 5% + sodium chloride 0.45%. 1000 milliLiter(s) (75 mL/Hr) IV Continuous <Continuous>  enoxaparin Injectable 40 milliGRAM(s) SubCutaneous every 24 hours  lactobacillus acidophilus 1 Tablet(s) Oral three times a day with meals  levothyroxine 25 MICROGram(s) Oral daily  meropenem  IVPB 1000 milliGRAM(s) IV Intermittent every 8 hours  pantoprazole  Injectable 40 milliGRAM(s) IV Push daily    MEDICATIONS  (PRN):  ALPRAZolam 0.25 milliGRAM(s) Oral every 6 hours PRN anxiety  artificial  tears Solution 1 Drop(s) Both EYES two times a day PRN Dry Eyes  morphine  - Injectable 4 milliGRAM(s) IV Push every 4 hours PRN Severe Pain (7 - 10)  naloxone Injectable 0.1 milliGRAM(s) IV Push every 3 minutes PRN For ANY of the following changes in patient status:  A. RR LESS THAN 10 breaths per minute, B. Oxygen saturation LESS THAN 90%, C. Sedation score of 6  ondansetron Injectable 4 milliGRAM(s) IV Push every 6 hours PRN Nausea and/or Vomiting  oxyCODONE    IR 5 milliGRAM(s) Oral every 4 hours PRN Mild Pain (1 - 3)  oxyCODONE    IR 10 milliGRAM(s) Oral every 4 hours PRN Moderate Pain (4 - 6)      REVIEW OF SYSTEMS:  CONSTITUTIONAL: No fever, weight loss, or fatigue  ENMT:  No throat pain  RESPIRATORY: No cough, wheezing, chills or hemoptysis; No shortness of breath  CARDIOVASCULAR: No chest pain, palpitations, dizziness, or leg swelling  GASTROINTESTINAL: abdominal pain improving, no nausea, vomiting, or hematemesis  GENITOURINARY: No dysuria, frequency, hematuria, or incontinence   NEUROLOGICAL: No headaches, memory loss, loss of strength, numbness, or tremors    RADIOLOGY & ADDITIONAL TESTS: NONE     Imaging Personally Reviewed:  [ ] YES  [ ] NO    Consultant(s) Notes Reviewed:  [X ] YES  [ ] NO    PHYSICAL EXAM:  GENERAL: anxious   HEAD: atraumatic, Normocephalic  EYES:  PERRL, conjunctiva and sclera clear  NERVOUS SYSTEM:  Alert & Oriented, Good concentration  CHEST/LUNG: Clear to percussion bilaterally; No rales, rhonchi, wheezing, or rubs  HEART: Regular rate and rhythm; No murmurs, rubs, or gallops  ABDOMEN: +left colostomy bag, surgical dressing dry and intact, nontender  EXTREMITIES: + edema b/l lower extremity improved from yesterday, No clubbing, cyanosis, or calf tenderness, + left arm swelling improved from yesterday     Care Discussed with Consultants/Other Providers [ ] YES  [ ] NO

## 2019-03-22 NOTE — PROGRESS NOTE ADULT - ATTENDING COMMENTS
above appreciated   all consults appreciated.  pt s/p pj procedire  no stool in the bag yet  currently on clears----with ngt in place  will advance diet when ok with surgery
Pt seen and dw housestaff  there are no medical contraindications to proposed surgical procedure

## 2019-03-22 NOTE — PROGRESS NOTE ADULT - ASSESSMENT
This is a 79 F comes with hypothyroidism, HLD, hx of diverticulitis, who presented to the ED with abdominal pain for 1 week, admitted for sigmoid diverticulitis. Patient now on Meropenem (Day 4), s/p  POD#3 Florez procedure.     ACUTE SIGMOID DIVERTICULITIS WITHOUT PERFORATION  -CT abdomen: persistent abnormality in the region of the sigmoid colon, pericolonic inflammatory   changes in this region suggesting diverticulitis   -POD#3 Florez procedure.   -WBC wnl today,  afebrile, Tylenol for fever   - currently NPO, NG tube clamped, surgery Dr. Spencer following, F/u recs  -Continue IVF: D5/1/2NS @ 75, BS checks Q8 hrs while NPO  -Continue with Meropenum (Day 5), ID DrBonnie (WINIFRED Urban) following, f/u recs   -Pain management: Mild: Oxycodone IR 5 mg Q4 Moderate: Oxycodone IR 10 mg Q4, Severe: morphine 4 IV Q4   -Zofran for nausea   -Blood culture prelim NGTD   -Surgical path: prelim NGTD   -Continue with protonix 40 IV daily   -GI  Dr. Burgos, f/u recs     Hypophosphatemia:    -Phosphate 2.0 this AM, improved, repleted with Kphosphate IV   -F/u AM labs     HYPOTHYROID  Chronic issue. Continue synthroid.     DYSLIPIDEMIA  Chronic issue. Home medication pravastatin, atorvastatin while inpatient (therapeutic interchange).       Need for prophylactic measures:   -Patient with b/l LE swelling, now improving, if worsening, of calf tenderness will consider dopplers    -advance care planning discussed- pt to provide health care proxy w forms w appointed health care agent documented  -Continue home medications of restasis and systane for dry eye  -Lovenox for DVT prophylaxis   BB IMPROVE VTE Individual Risk Assessment          RISK                                                          Points    [  ] Previous VTE                                                3  [  ] Thrombophilia                                             2  [  ] Lower limb paralysis                                   2        (unable to hold up >15 seconds)    [  ] Current Cancer                                            2         (within 6 months)  [  ] Immobilization > 24 hrs                              1  [  ] ICU/CCU stay > 24 hours                            1  [ 1 ] Age > 60                                                    1    IMPROVE VTE Score __1_______

## 2019-03-23 LAB
ANION GAP SERPL CALC-SCNC: 6 MMOL/L — SIGNIFICANT CHANGE UP (ref 5–17)
BASOPHILS # BLD AUTO: 0.02 K/UL — SIGNIFICANT CHANGE UP (ref 0–0.2)
BASOPHILS NFR BLD AUTO: 0.3 % — SIGNIFICANT CHANGE UP (ref 0–2)
BUN SERPL-MCNC: 7 MG/DL — SIGNIFICANT CHANGE UP (ref 7–23)
CALCIUM SERPL-MCNC: 8.1 MG/DL — LOW (ref 8.5–10.1)
CHLORIDE SERPL-SCNC: 105 MMOL/L — SIGNIFICANT CHANGE UP (ref 96–108)
CO2 SERPL-SCNC: 32 MMOL/L — HIGH (ref 22–31)
CREAT SERPL-MCNC: 0.62 MG/DL — SIGNIFICANT CHANGE UP (ref 0.5–1.3)
EOSINOPHIL # BLD AUTO: 0.22 K/UL — SIGNIFICANT CHANGE UP (ref 0–0.5)
EOSINOPHIL NFR BLD AUTO: 3 % — SIGNIFICANT CHANGE UP (ref 0–6)
GLUCOSE SERPL-MCNC: 110 MG/DL — HIGH (ref 70–99)
HCT VFR BLD CALC: 31.3 % — LOW (ref 34.5–45)
HGB BLD-MCNC: 10.4 G/DL — LOW (ref 11.5–15.5)
IMM GRANULOCYTES NFR BLD AUTO: 0.8 % — SIGNIFICANT CHANGE UP (ref 0–1.5)
LYMPHOCYTES # BLD AUTO: 1.35 K/UL — SIGNIFICANT CHANGE UP (ref 1–3.3)
LYMPHOCYTES # BLD AUTO: 18.2 % — SIGNIFICANT CHANGE UP (ref 13–44)
MAGNESIUM SERPL-MCNC: 1.9 MG/DL — SIGNIFICANT CHANGE UP (ref 1.6–2.6)
MCHC RBC-ENTMCNC: 31 PG — SIGNIFICANT CHANGE UP (ref 27–34)
MCHC RBC-ENTMCNC: 33.2 GM/DL — SIGNIFICANT CHANGE UP (ref 32–36)
MCV RBC AUTO: 93.2 FL — SIGNIFICANT CHANGE UP (ref 80–100)
MONOCYTES # BLD AUTO: 0.59 K/UL — SIGNIFICANT CHANGE UP (ref 0–0.9)
MONOCYTES NFR BLD AUTO: 8 % — SIGNIFICANT CHANGE UP (ref 2–14)
NEUTROPHILS # BLD AUTO: 5.17 K/UL — SIGNIFICANT CHANGE UP (ref 1.8–7.4)
NEUTROPHILS NFR BLD AUTO: 69.7 % — SIGNIFICANT CHANGE UP (ref 43–77)
NRBC # BLD: 0 /100 WBCS — SIGNIFICANT CHANGE UP (ref 0–0)
PHOSPHATE SERPL-MCNC: 3.1 MG/DL — SIGNIFICANT CHANGE UP (ref 2.5–4.5)
PLATELET # BLD AUTO: 292 K/UL — SIGNIFICANT CHANGE UP (ref 150–400)
POTASSIUM SERPL-MCNC: 3.6 MMOL/L — SIGNIFICANT CHANGE UP (ref 3.5–5.3)
POTASSIUM SERPL-SCNC: 3.6 MMOL/L — SIGNIFICANT CHANGE UP (ref 3.5–5.3)
RBC # BLD: 3.36 M/UL — LOW (ref 3.8–5.2)
RBC # FLD: 12.8 % — SIGNIFICANT CHANGE UP (ref 10.3–14.5)
SODIUM SERPL-SCNC: 143 MMOL/L — SIGNIFICANT CHANGE UP (ref 135–145)
WBC # BLD: 7.41 K/UL — SIGNIFICANT CHANGE UP (ref 3.8–10.5)
WBC # FLD AUTO: 7.41 K/UL — SIGNIFICANT CHANGE UP (ref 3.8–10.5)

## 2019-03-23 RX ADMIN — SODIUM CHLORIDE 75 MILLILITER(S): 9 INJECTION, SOLUTION INTRAVENOUS at 03:40

## 2019-03-23 RX ADMIN — Medication 1 TABLET(S): at 17:19

## 2019-03-23 RX ADMIN — OXYCODONE HYDROCHLORIDE 5 MILLIGRAM(S): 5 TABLET ORAL at 02:44

## 2019-03-23 RX ADMIN — ATORVASTATIN CALCIUM 10 MILLIGRAM(S): 80 TABLET, FILM COATED ORAL at 21:29

## 2019-03-23 RX ADMIN — ERTAPENEM SODIUM 120 MILLIGRAM(S): 1 INJECTION, POWDER, LYOPHILIZED, FOR SOLUTION INTRAMUSCULAR; INTRAVENOUS at 13:04

## 2019-03-23 RX ADMIN — OXYCODONE HYDROCHLORIDE 5 MILLIGRAM(S): 5 TABLET ORAL at 17:15

## 2019-03-23 RX ADMIN — OXYCODONE HYDROCHLORIDE 5 MILLIGRAM(S): 5 TABLET ORAL at 21:32

## 2019-03-23 RX ADMIN — ENOXAPARIN SODIUM 40 MILLIGRAM(S): 100 INJECTION SUBCUTANEOUS at 12:48

## 2019-03-23 RX ADMIN — Medication 1 TABLET(S): at 12:49

## 2019-03-23 RX ADMIN — PANTOPRAZOLE SODIUM 40 MILLIGRAM(S): 20 TABLET, DELAYED RELEASE ORAL at 12:49

## 2019-03-23 RX ADMIN — Medication 25 MICROGRAM(S): at 05:39

## 2019-03-23 RX ADMIN — SODIUM CHLORIDE 75 MILLILITER(S): 9 INJECTION, SOLUTION INTRAVENOUS at 12:48

## 2019-03-23 RX ADMIN — OXYCODONE HYDROCHLORIDE 5 MILLIGRAM(S): 5 TABLET ORAL at 22:27

## 2019-03-23 RX ADMIN — OXYCODONE HYDROCHLORIDE 5 MILLIGRAM(S): 5 TABLET ORAL at 03:45

## 2019-03-23 NOTE — PROGRESS NOTE ADULT - SUBJECTIVE AND OBJECTIVE BOX
Patient is a 79y old  Female who presents with a chief complaint of diverticulitis (22 Mar 2019 11:00)  ngt out. tolerating clears.  complains of diffuse edema    INTERVAL HPI/OVERNIGHT EVENTS:  T(C): 36.9 (03-23-19 @ 07:44), Max: 36.9 (03-23-19 @ 07:44)  HR: 65 (03-23-19 @ 07:44) (65 - 75)  BP: 137/80 (03-23-19 @ 07:44) (132/71 - 144/78)  RR: 17 (03-23-19 @ 07:44) (16 - 18)  SpO2: 98% (03-23-19 @ 07:44) (95% - 98%)  Wt(kg): --  I&O's Summary    22 Mar 2019 07:01  -  23 Mar 2019 07:00  --------------------------------------------------------  IN: 950 mL / OUT: 2450 mL / NET: -1500 mL        LABS:                        10.4   7.41  )-----------( 292      ( 23 Mar 2019 05:01 )             31.3     03-23    143  |  105  |  7   ----------------------------<  110<H>  3.6   |  32<H>  |  0.62    Ca    8.1<L>      23 Mar 2019 05:01  Phos  3.1     03-23  Mg     1.9     03-23          CAPILLARY BLOOD GLUCOSE      POCT Blood Glucose.: 110 mg/dL (22 Mar 2019 16:24)            MEDICATIONS  (STANDING):  atorvastatin 10 milliGRAM(s) Oral at bedtime  enoxaparin Injectable 40 milliGRAM(s) SubCutaneous every 24 hours  ertapenem  IVPB 1000 milliGRAM(s) IV Intermittent every 24 hours  lactobacillus acidophilus 1 Tablet(s) Oral three times a day with meals  levothyroxine 25 MICROGram(s) Oral daily  pantoprazole  Injectable 40 milliGRAM(s) IV Push daily    MEDICATIONS  (PRN):  ALPRAZolam 0.25 milliGRAM(s) Oral every 6 hours PRN anxiety  artificial  tears Solution 1 Drop(s) Both EYES two times a day PRN Dry Eyes  morphine  - Injectable 4 milliGRAM(s) IV Push every 4 hours PRN Severe Pain (7 - 10)  naloxone Injectable 0.1 milliGRAM(s) IV Push every 3 minutes PRN For ANY of the following changes in patient status:  A. RR LESS THAN 10 breaths per minute, B. Oxygen saturation LESS THAN 90%, C. Sedation score of 6  ondansetron Injectable 4 milliGRAM(s) IV Push every 6 hours PRN Nausea and/or Vomiting  oxyCODONE    IR 5 milliGRAM(s) Oral every 4 hours PRN Mild Pain (1 - 3)  oxyCODONE    IR 10 milliGRAM(s) Oral every 4 hours PRN Moderate Pain (4 - 6)      REVIEW OF SYSTEMS:  CONSTITUTIONAL: No fever, weight loss, or fatigue  EYES: No eye pain, visual disturbances, or discharge  ENMT:  No difficulty hearing, tinnitus, vertigo; No sinus or throat pain  NECK: No pain or stiffness  RESPIRATORY: No cough, wheezing, chills or hemoptysis; No shortness of breath  CARDIOVASCULAR: No chest pain, palpitations, dizziness, or leg swelling  GASTROINTESTINAL: No abdominal or epigastric pain. No nausea, vomiting, or hematemesis; No diarrhea or constipation. No melena or hematochezia.  GENITOURINARY: No dysuria, frequency, hematuria, or incontinence  NEUROLOGICAL: No headaches, memory loss, loss of strength, numbness, or tremors  SKIN: No itching, burning, rashes, or lesions   LYMPH NODES: No enlarged glands  ENDOCRINE: No heat or cold intolerance; No hair loss  MUSCULOSKELETAL: No joint pain or swelling; No muscle, back, or extremity pain  PSYCHIATRIC: No depression, anxiety, mood swings, or difficulty sleeping  HEME/LYMPH: No easy bruising, or bleeding gums  ALLERY AND IMMUNOLOGIC: No hives or eczema    RADIOLOGY & ADDITIONAL TESTS:    Imaging Personally Reviewed:  [ ] YES  [ ] NO    Consultant(s) Notes Reviewed:  [ ] YES  [ ] NO    PHYSICAL EXAM:  GENERAL: NAD, well-groomed, well-developed  HEAD:  Atraumatic, Normocephalic  EYES: EOMI, PERRLA, conjunctiva and sclera clear  ENMT: No tonsillar erythema, exudates, or enlargement; Moist mucous membranes, Good dentition, No lesions  NECK: Supple, No JVD, Normal thyroid  NERVOUS SYSTEM:  Alert & Oriented X3, Good concentration; Motor Strength 5/5 B/L upper and lower extremities; DTRs 2+ intact and symmetric  CHEST/LUNG: Clear to percussion bilaterally; No rales, rhonchi, wheezing, or rubs  HEART: Regular rate and rhythm; No murmurs, rubs, or gallops  ABDOMEN: Soft, Nontender, Nondistended; Bowel sounds present  EXTREMITIES: trace le edema  LYMPH: No lymphadenopathy noted  SKIN: No rashes or lesions    Care Discussed with Consultants/Other Providers [ ] YES  [ ] NO

## 2019-03-23 NOTE — PROGRESS NOTE ADULT - ASSESSMENT
This is a 79 F comes with hypothyroidism, HLD, hx of diverticulitis, who presented to the ED with abdominal pain for 1 week, admitted for sigmoid diverticulitis. Patient now on Meropenem (Day 4), s/p  POD#3 Florez procedure.     ACUTE SIGMOID DIVERTICULITIS WITHOUT PERFORATION  -s/p Melania.  ngt out, tolerating clears.  dc ivf re edema.     HYPOTHYROID  Chronic issue. Continue synthroid.     DYSLIPIDEMIA  Chronic issue. Home medication pravastatin, atorvastatin while inpatient (therapeutic interchange).       Need for prophylactic measures:   -advance care planning discussed- pt to provide health care proxy w forms w appointed health care agent documented  -Continue home medications of restasis and systane for dry eye  -Lovenox for DVT prophylaxis   BB IMPROVE VTE Individual Risk Assessment          RISK                                                          Points    [  ] Previous VTE                                                3  [  ] Thrombophilia                                             2  [  ] Lower limb paralysis                                   2        (unable to hold up >15 seconds)    [  ] Current Cancer                                            2         (within 6 months)  [  ] Immobilization > 24 hrs                              1  [  ] ICU/CCU stay > 24 hours                            1  [ 1 ] Age > 60                                                    1    IMPROVE VTE Score __1_______

## 2019-03-23 NOTE — PROGRESS NOTE ADULT - SUBJECTIVE AND OBJECTIVE BOX
Subjective: Pt c/o pain.  NGT out and tolerating clear liquids.    Objective:  Vital Signs Last 24 Hrs  T(C): 36.9 (23 Mar 2019 07:44), Max: 36.9 (23 Mar 2019 07:44)  T(F): 98.5 (23 Mar 2019 07:44), Max: 98.5 (23 Mar 2019 07:44)  HR: 65 (23 Mar 2019 07:44) (65 - 75)  BP: 137/80 (23 Mar 2019 07:44) (132/71 - 144/78)  BP(mean): --  RR: 17 (23 Mar 2019 07:44) (16 - 18)  SpO2: 98% (23 Mar 2019 07:44) (95% - 98%)    Heent: AZUCENA AUSTIN  Pul: clear  Cor: RSR, without murmurs  Abdomen: normal bowel sounds, without distension  Incision clean with two opened areas  Colostomy without functioning  Extremities: without edema, motor/sensory intact, without calf pain, Homans sign negative.                        10.4   7.41  )-----------( 292      ( 23 Mar 2019 05:01 )             31.3       03-23    143  |  105  |  7   ----------------------------<  110<H>  3.6   |  32<H>  |  0.62    Ca    8.1<L>      23 Mar 2019 05:01  Phos  3.1     03-23  Mg     1.9     03-23 03-22 @ 07:01  -  03-23 @ 07:00  --------------------------------------------------------  IN:    dextrose 5% + sodium chloride 0.45%.: 900 mL    Solution: 50 mL  Total IN: 950 mL    OUT:    Voided: 2450 mL  Total OUT: 2450 mL    Total NET: -1500 mL      03-23 @ 07:01  -  03-23 @ 15:01  --------------------------------------------------------  IN:  Total IN: 0 mL    OUT:    Voided: 700 mL  Total OUT: 700 mL    Total NET: -700 mL

## 2019-03-23 NOTE — PROGRESS NOTE ADULT - SUBJECTIVE AND OBJECTIVE BOX
ID progress note     Name: MARIO HARRY  Age: 79y  Gender: Female  MRN: [id number]    Interval History-- Events noted , doing well, NGT removed this am, tolerating clear liquids . No gas  or stool in colostomy  Notes reviewed    Past Medical History--  Diverticulosis  Hyperlipidemia  Hypothyroidism  H/O arthroscopic knee surgery  No significant past surgical history      For details regarding the patient's social history, family history, and other miscellaneous elements, please refer the initial infectious diseases consultation and/or the admitting history and physical examination for this admission.    Allergies--  Allergies    clindamycin (Stomach Upset)    Intolerances        Medications--  Antibiotics:  ertapenem  IVPB 1000 milliGRAM(s) IV Intermittent every 24 hours    Immunologic:    Other:  ALPRAZolam PRN  artificial  tears Solution PRN  atorvastatin  enoxaparin Injectable  lactobacillus acidophilus  levothyroxine  morphine  - Injectable PRN  naloxone Injectable PRN  ondansetron Injectable PRN  oxyCODONE    IR PRN  oxyCODONE    IR PRN  pantoprazole  Injectable      Review of Systems--  A 10-point review of systems was obtained.     Pertinent positives and negatives--  Constitutional: No fevers. No Chills. No Rigors.   Cardiovascular: No chest pain. No palpitations.  Respiratory: No shortness of breath. No cough.  Gastrointestinal: No nausea or vomiting. No diarrhea or constipation.   Psychiatric: Pleasant. Appropriate affect.    Review of systems otherwise negative except as previously noted.    Physical Examination--  Vital Signs: T(F): 98.5 (03-23-19 @ 07:44), Max: 98.5 (03-23-19 @ 07:44)  HR: 65 (03-23-19 @ 07:44)  BP: 137/80 (03-23-19 @ 07:44)  RR: 17 (03-23-19 @ 07:44)  SpO2: 98% (03-23-19 @ 07:44)  Wt(kg): --  General: Nontoxic-appearing Female in no acute distress.  HEENT: AT/NC. PERRL. EOMI. Anicteric. Conjunctiva pink and moist. Oropharynx clear. Dentition fair.  Neck: Not rigid. No sense of mass.  Nodes: None palpable.  Lungs: Clear bilaterally without rales, wheezing or rhonchi  Heart: Regular rate and rhythm. No Murmur. No rub. No gallop. No palpable thrill.  Abdomen: Bowel sounds present and normoactive. Soft. Nondistended. Nontender. No sense of mass. No organomegaly. left colostomy - dry,   Back: No spinal tenderness. No costovertebral angle tenderness.   Extremities: No cyanosis or clubbing. No edema.   Skin: Warm. Dry. Good turgor. No rash. No vasculitic stigmata.  Psychiatric: Appropriate affect and mood for situation.         Laboratory Studies--  CBC                        10.4   7.41  )-----------( 292      ( 23 Mar 2019 05:01 )             31.3       Chemistries  03-23    143  |  105  |  7   ----------------------------<  110<H>  3.6   |  32<H>  |  0.62    Ca    8.1<L>      23 Mar 2019 05:01  Phos  3.1     03-23  Mg     1.9     03-23        Culture Data    Culture - Surgical Swab (collected 20 Mar 2019 01:10)  Source: .Surgical Swab peritoneal fluid  Preliminary Report (20 Mar 2019 19:50):    No growth to date.    Culture - Blood (collected 17 Mar 2019 14:27)  Source: .Blood Blood  Final Report (22 Mar 2019 15:00):    No growth at 5 days.    Culture - Blood (collected 17 Mar 2019 14:27)  Source: .Blood Blood  Final Report (22 Mar 2019 15:00):    No growth at 5 days.        RADIOLOGY:    CT Abdomen and Pelvis w/ Oral Cont and w/ IV Cont (03.16.19 @ 17:07) >  IMPRESSION:    Persistent abnormality in the region of the sigmoid colon without change   in appearance since the prior study. There are pericolonic inflammatory   changes in this region suggesting diverticulitis. No evidence of   associated abscess. There is now a small amount of ascites in the upper   abdomen adjacent to the liver which is a new finding since the prior   examination..        Assessment--    79 year old female hx of diverticulitis , last episode in Dec 2017, admitted with recurrent sigmoid diverticulitis not improved on po antibiotics x 6 days , with worsening abdominal pain and low grade fevers. Unfortunately no blood cs done . Now with increased WBC, low grade fever and worsening pain. Repeat ct scan of abdomen did not show any changes in the inflammatory reaction or abscess development .    She is POD # 4 . s/p left sigmoid colon resection with Florez's procedure     Plan :   - will continue with  IV Invanz 1 gram daily  x 3 more days then dc   - advance diet when ok with surgery   - incentive spirometry   - trend cbc  - increase activity   - colostomy teaching     Continue with present regime .  Appropriate use of antibiotics and adverse effects reviewed.    I have discussed the above plan of care with patient and family in detail. They expressed understanding of the treatment plan . Risks, benefits and alternatives discussed in detail. I have asked if they have any questions or concerns and appropriately addressed them to the best of my ability .      > 35 minutes spent in direct patient care reviewing  the notes, lab data/ imaging , discussion with multidisciplinary team. All questions were addressed and answered to the best of my capacity .    Thank you for allowing me to participate in the care of your patient .        Phoebe Urban MD  [phone]

## 2019-03-24 LAB
ANION GAP SERPL CALC-SCNC: 6 MMOL/L — SIGNIFICANT CHANGE UP (ref 5–17)
BUN SERPL-MCNC: 7 MG/DL — SIGNIFICANT CHANGE UP (ref 7–23)
CALCIUM SERPL-MCNC: 8.8 MG/DL — SIGNIFICANT CHANGE UP (ref 8.5–10.1)
CHLORIDE SERPL-SCNC: 103 MMOL/L — SIGNIFICANT CHANGE UP (ref 96–108)
CO2 SERPL-SCNC: 33 MMOL/L — HIGH (ref 22–31)
CREAT SERPL-MCNC: 0.68 MG/DL — SIGNIFICANT CHANGE UP (ref 0.5–1.3)
CULTURE RESULTS: SIGNIFICANT CHANGE UP
GLUCOSE SERPL-MCNC: 92 MG/DL — SIGNIFICANT CHANGE UP (ref 70–99)
HCT VFR BLD CALC: 35.8 % — SIGNIFICANT CHANGE UP (ref 34.5–45)
HGB BLD-MCNC: 11.6 G/DL — SIGNIFICANT CHANGE UP (ref 11.5–15.5)
MCHC RBC-ENTMCNC: 30.7 PG — SIGNIFICANT CHANGE UP (ref 27–34)
MCHC RBC-ENTMCNC: 32.4 GM/DL — SIGNIFICANT CHANGE UP (ref 32–36)
MCV RBC AUTO: 94.7 FL — SIGNIFICANT CHANGE UP (ref 80–100)
NRBC # BLD: 0 /100 WBCS — SIGNIFICANT CHANGE UP (ref 0–0)
PLATELET # BLD AUTO: 341 K/UL — SIGNIFICANT CHANGE UP (ref 150–400)
POTASSIUM SERPL-MCNC: 3.8 MMOL/L — SIGNIFICANT CHANGE UP (ref 3.5–5.3)
POTASSIUM SERPL-SCNC: 3.8 MMOL/L — SIGNIFICANT CHANGE UP (ref 3.5–5.3)
RBC # BLD: 3.78 M/UL — LOW (ref 3.8–5.2)
RBC # FLD: 12.8 % — SIGNIFICANT CHANGE UP (ref 10.3–14.5)
SODIUM SERPL-SCNC: 142 MMOL/L — SIGNIFICANT CHANGE UP (ref 135–145)
SPECIMEN SOURCE: SIGNIFICANT CHANGE UP
WBC # BLD: 6.44 K/UL — SIGNIFICANT CHANGE UP (ref 3.8–10.5)
WBC # FLD AUTO: 6.44 K/UL — SIGNIFICANT CHANGE UP (ref 3.8–10.5)

## 2019-03-24 RX ORDER — DOCUSATE SODIUM 100 MG
100 CAPSULE ORAL THREE TIMES A DAY
Qty: 0 | Refills: 0 | Status: DISCONTINUED | OUTPATIENT
Start: 2019-03-24 | End: 2019-03-29

## 2019-03-24 RX ADMIN — Medication 1 TABLET(S): at 18:45

## 2019-03-24 RX ADMIN — OXYCODONE HYDROCHLORIDE 5 MILLIGRAM(S): 5 TABLET ORAL at 11:14

## 2019-03-24 RX ADMIN — ENOXAPARIN SODIUM 40 MILLIGRAM(S): 100 INJECTION SUBCUTANEOUS at 11:56

## 2019-03-24 RX ADMIN — Medication 25 MICROGRAM(S): at 06:07

## 2019-03-24 RX ADMIN — OXYCODONE HYDROCHLORIDE 5 MILLIGRAM(S): 5 TABLET ORAL at 20:24

## 2019-03-24 RX ADMIN — Medication 1 TABLET(S): at 14:13

## 2019-03-24 RX ADMIN — OXYCODONE HYDROCHLORIDE 5 MILLIGRAM(S): 5 TABLET ORAL at 19:24

## 2019-03-24 RX ADMIN — ERTAPENEM SODIUM 120 MILLIGRAM(S): 1 INJECTION, POWDER, LYOPHILIZED, FOR SOLUTION INTRAMUSCULAR; INTRAVENOUS at 11:58

## 2019-03-24 RX ADMIN — PANTOPRAZOLE SODIUM 40 MILLIGRAM(S): 20 TABLET, DELAYED RELEASE ORAL at 11:56

## 2019-03-24 RX ADMIN — ATORVASTATIN CALCIUM 10 MILLIGRAM(S): 80 TABLET, FILM COATED ORAL at 21:24

## 2019-03-24 RX ADMIN — Medication 1 TABLET(S): at 11:56

## 2019-03-24 RX ADMIN — Medication 100 MILLIGRAM(S): at 21:24

## 2019-03-24 NOTE — PROGRESS NOTE ADULT - SUBJECTIVE AND OBJECTIVE BOX
Subjective: Pt tolerating clear liquids, with colostomy starting to function.    Objective:  Vital Signs Last 24 Hrs  T(C): 36.6 (24 Mar 2019 07:49), Max: 36.7 (24 Mar 2019 00:10)  T(F): 97.8 (24 Mar 2019 07:49), Max: 98.1 (24 Mar 2019 00:10)  HR: 75 (24 Mar 2019 07:49) (71 - 75)  BP: 118/72 (24 Mar 2019 07:49) (116/70 - 118/72)  BP(mean): --  RR: 18 (24 Mar 2019 07:49) (16 - 18)  SpO2: 98% (24 Mar 2019 07:49) (95% - 98%)    Heent: GEOVANNA, FREOM  Pul: clear  Cor: RSR, without murmurs  Abdomen: normal bowel sounds, without distension  colostomy with tiny amount of stool  Extremities: without edema, motor/sensory intact, without calf pain, Homans sign negative.                        11.6   6.44  )-----------( 341      ( 24 Mar 2019 05:10 )             35.8       03-24    142  |  103  |  7   ----------------------------<  92  3.8   |  33<H>  |  0.68    Ca    8.8      24 Mar 2019 05:10  Phos  3.1     03-23  Mg     1.9     03-23 03-23 @ 07:01  -  03-24 @ 07:00  --------------------------------------------------------  IN:  Total IN: 0 mL    OUT:    Voided: 700 mL  Total OUT: 700 mL    Total NET: -700 mL

## 2019-03-24 NOTE — PROGRESS NOTE ADULT - ASSESSMENT
This is a 79 F comes with hypothyroidism, HLD, hx of diverticulitis, who presented to the ED with abdominal pain for 1 week, admitted for sigmoid diverticulitis. Patient now on Invanz (through 3/26), s/p POD#5 Florez procedure.     ACUTE SIGMOID DIVERTICULITIS WITHOUT PERFORATION  -s/p Melania.  ngt out, tolerating clears, off ivf.  stool in colostomy.  would advance diet as bowel function returning, if ok with surgery-awaiting recs. cont invanz through 3/26 per ID.    HYPOTHYROID  Chronic issue. Continue synthroid.     DYSLIPIDEMIA  Chronic issue. Home medication pravastatin, atorvastatin while inpatient (therapeutic interchange).       Need for prophylactic measures:   -advance care planning discussed- pt to provide health care proxy w forms w appointed health care agent documented  -Continue home medications of restasis and systane for dry eye  -Lovenox for DVT prophylaxis

## 2019-03-24 NOTE — PROGRESS NOTE ADULT - SUBJECTIVE AND OBJECTIVE BOX
Patient is a 79y old  Female who presents with a chief complaint of LBO (23 Mar 2019 15:00)      INTERVAL HPI/OVERNIGHT EVENTS:  feels well, states that her abdomen still feels "tight." Stool seen in colostomy bag. Tolerating clears well, but states she is not eating as much. having lemon ice and apple juice.    T(C): 36.6 (03-24-19 @ 07:49), Max: 36.7 (03-24-19 @ 00:10)  HR: 75 (03-24-19 @ 07:49) (71 - 75)  BP: 118/72 (03-24-19 @ 07:49) (116/70 - 118/72)  RR: 18 (03-24-19 @ 07:49) (16 - 18)  SpO2: 98% (03-24-19 @ 07:49) (95% - 98%)  Wt(kg): --  I&O's Summary    23 Mar 2019 07:01  -  24 Mar 2019 07:00  --------------------------------------------------------  IN: 0 mL / OUT: 700 mL / NET: -700 mL        LABS:                        11.6   6.44  )-----------( 341      ( 24 Mar 2019 05:10 )             35.8     03-24    142  |  103  |  7   ----------------------------<  92  3.8   |  33<H>  |  0.68    Ca    8.8      24 Mar 2019 05:10  Phos  3.1     03-23  Mg     1.9     03-23          CAPILLARY BLOOD GLUCOSE                MEDICATIONS  (STANDING):  atorvastatin 10 milliGRAM(s) Oral at bedtime  enoxaparin Injectable 40 milliGRAM(s) SubCutaneous every 24 hours  ertapenem  IVPB 1000 milliGRAM(s) IV Intermittent every 24 hours  lactobacillus acidophilus 1 Tablet(s) Oral three times a day with meals  levothyroxine 25 MICROGram(s) Oral daily  pantoprazole  Injectable 40 milliGRAM(s) IV Push daily    MEDICATIONS  (PRN):  ALPRAZolam 0.25 milliGRAM(s) Oral every 6 hours PRN anxiety  artificial  tears Solution 1 Drop(s) Both EYES two times a day PRN Dry Eyes  morphine  - Injectable 4 milliGRAM(s) IV Push every 4 hours PRN Severe Pain (7 - 10)  naloxone Injectable 0.1 milliGRAM(s) IV Push every 3 minutes PRN For ANY of the following changes in patient status:  A. RR LESS THAN 10 breaths per minute, B. Oxygen saturation LESS THAN 90%, C. Sedation score of 6  ondansetron Injectable 4 milliGRAM(s) IV Push every 6 hours PRN Nausea and/or Vomiting  oxyCODONE    IR 5 milliGRAM(s) Oral every 4 hours PRN Mild Pain (1 - 3)  oxyCODONE    IR 10 milliGRAM(s) Oral every 4 hours PRN Moderate Pain (4 - 6)      REVIEW OF SYSTEMS:  CONSTITUTIONAL: No fever, weight loss, or fatigue  EYES: No eye pain, visual disturbances, or discharge  ENMT:  No difficulty hearing, tinnitus, vertigo; No sinus or throat pain  NECK: No pain or stiffness  RESPIRATORY: No cough, wheezing, chills or hemoptysis; No shortness of breath  CARDIOVASCULAR: No chest pain, palpitations, dizziness, or leg swelling  GASTROINTESTINAL: No nausea, vomiting, or hematemesis; No diarrhea or constipation. No melena or hematochezia.; +abdominal tightness/pain controlled w meds, +stool in colostomy  GENITOURINARY: No dysuria, frequency, hematuria, or incontinence  NEUROLOGICAL: No headaches, memory loss, loss of strength, numbness, or tremors  SKIN: No itching, burning, rashes, or lesions   LYMPH NODES: No enlarged glands  ENDOCRINE: No heat or cold intolerance; No hair loss  MUSCULOSKELETAL: No joint pain or swelling; No muscle, back, or extremity pain  PSYCHIATRIC: No depression, anxiety, mood swings, or difficulty sleeping  HEME/LYMPH: No easy bruising, or bleeding gums  ALLERGY AND IMMUNOLOGIC: No hives or eczema    RADIOLOGY & ADDITIONAL TESTS: no new imaging    PHYSICAL EXAM:  GENERAL: NAD, well-groomed, well-developed  HEAD:  Atraumatic, Normocephalic  EYES: EOMI, PERRLA, conjunctiva and sclera clear  ENMT: No tonsillar erythema, exudates, or enlargement; Moist mucous membranes, Good dentition, No lesions  NECK: Supple, No JVD, Normal thyroid  NERVOUS SYSTEM:  Alert & Oriented X3, Good concentration; nonfocal  CHEST/LUNG: clear to auscultation bilaterally, mildly diminished bibasilar breath sounds  HEART: Regular rate and rhythm; No murmurs, rubs, or gallops  ABDOMEN: Soft, with mild distention/tenderness around surgical site; Bowel sounds present, stool in colostomy bag  EXTREMITIES:  2+ Peripheral Pulses, No clubbing, cyanosis; trace LE edema, nontender to palpation  LYMPH: No lymphadenopathy noted  SKIN: No rashes or lesions    Care Discussed with Consultants/Other Providers [x] YES  [ ] NO

## 2019-03-24 NOTE — PROGRESS NOTE ADULT - ASSESSMENT
IMPRESSION pt improving  PLAN increase to full liquids with supplements           encourage OOB           colostomy teaching

## 2019-03-24 NOTE — PROGRESS NOTE ADULT - SUBJECTIVE AND OBJECTIVE BOX
ID progress note     Name: MARIO HARRY  Age: 79y  Gender: Female  MRN: [id number]    Interval History-- Events noted, doing well, stool seen in colostomy . Still on clear liquid diet. No nausea or vomiting   Notes reviewed    Past Medical History--  Diverticulosis  Hyperlipidemia  Hypothyroidism  H/O arthroscopic knee surgery  No significant past surgical history      For details regarding the patient's social history, family history, and other miscellaneous elements, please refer the initial infectious diseases consultation and/or the admitting history and physical examination for this admission.    Allergies--  Allergies    clindamycin (Stomach Upset)    Intolerances        Medications--  Antibiotics:  ertapenem  IVPB 1000 milliGRAM(s) IV Intermittent every 24 hours    Immunologic:    Other:  ALPRAZolam PRN  artificial  tears Solution PRN  atorvastatin  enoxaparin Injectable  lactobacillus acidophilus  levothyroxine  morphine  - Injectable PRN  naloxone Injectable PRN  ondansetron Injectable PRN  oxyCODONE    IR PRN  oxyCODONE    IR PRN  pantoprazole  Injectable      Review of Systems--  A 10-point review of systems was obtained.     Pertinent positives and negatives--  Constitutional: No fevers. No Chills. No Rigors.   Cardiovascular: No chest pain. No palpitations.  Respiratory: No shortness of breath. No cough.  Gastrointestinal: No nausea or vomiting. No diarrhea or constipation.   Psychiatric: Pleasant. Appropriate affect.    Review of systems otherwise negative except as previously noted.    Physical Examination--  Vital Signs: T(F): 97.8 (03-24-19 @ 07:49), Max: 98.1 (03-24-19 @ 00:10)  HR: 75 (03-24-19 @ 07:49)  BP: 118/72 (03-24-19 @ 07:49)  RR: 18 (03-24-19 @ 07:49)  SpO2: 98% (03-24-19 @ 07:49)  Wt(kg): --  General: Nontoxic-appearing Female in no acute distress.  HEENT: AT/NC. PERRL. EOMI. Anicteric. Conjunctiva pink and moist. Oropharynx clear. Dentition fair.  Neck: Not rigid. No sense of mass.  Nodes: None palpable.  Lungs: Clear bilaterally without rales, wheezing or rhonchi  Heart: Regular rate and rhythm. No Murmur. No rub. No gallop. No palpable thrill.  Abdomen: Bowel sounds present and normoactive. Soft. Nondistended. Nontender. left colostomy- stoma pink, soft stool noted .  Back: No spinal tenderness. No costovertebral angle tenderness.   Extremities: No cyanosis or clubbing. No edema.   Skin: Warm. Dry. Good turgor. No rash. No vasculitic stigmata.  Psychiatric: Appropriate affect and mood for situation.         Laboratory Studies--  CBC                        11.6   6.44  )-----------( 341      ( 24 Mar 2019 05:10 )             35.8       Chemistries  03-24    142  |  103  |  7   ----------------------------<  92  3.8   |  33<H>  |  0.68    Ca    8.8      24 Mar 2019 05:10  Phos  3.1     03-23  Mg     1.9     03-23        Culture Data    Culture - Surgical Swab (collected 20 Mar 2019 01:10)  Source: .Surgical Swab peritoneal fluid  Preliminary Report (20 Mar 2019 19:50):    No growth to date.    Culture - Blood (collected 17 Mar 2019 14:27)  Source: .Blood Blood  Final Report (22 Mar 2019 15:00):    No growth at 5 days.    Culture - Blood (collected 17 Mar 2019 14:27)  Source: .Blood Blood  Final Report (22 Mar 2019 15:00):    No growth at 5 days.    RADIOLOGY:    CT Abdomen and Pelvis w/ Oral Cont and w/ IV Cont (03.16.19 @ 17:07) >  IMPRESSION:    Persistent abnormality in the region of the sigmoid colon without change   in appearance since the prior study. There are pericolonic inflammatory   changes in this region suggesting diverticulitis. No evidence of   associated abscess. There is now a small amount of ascites in the upper   abdomen adjacent to the liver which is a new finding since the prior   examination..        Assessment--    79 year old female hx of diverticulitis , last episode in Dec 2017, admitted with recurrent sigmoid diverticulitis not improved on po antibiotics x 6 days , with worsening abdominal pain and low grade fevers. Unfortunately no blood cs done . Now with increased WBC, low grade fever and worsening pain. Repeat ct scan of abdomen did not show any changes in the inflammatory reaction or abscess development .    She is POD # 5 . s/p left sigmoid colon resection with Florez's procedure , colostomy functional     Plan :   - will continue with  IV Invanz 1 gram daily  to end on 3/26/19  - advance diet if ok with surgery   - incentive spirometry   - trend cbc  - increase activity   - colostomy teaching     Continue with present regime .  Appropriate use of antibiotics and adverse effects reviewed.    I have discussed the above plan of care with patient and family in detail. They expressed understanding of the treatment plan . Risks, benefits and alternatives discussed in detail. I have asked if they have any questions or concerns and appropriately addressed them to the best of my ability .      > 35 minutes spent in direct patient care reviewing  the notes, lab data/ imaging , discussion with multidisciplinary team. All questions were addressed and answered to the best of my capacity .    Thank you for allowing me to participate in the care of your patient .        Phoebe Urban MD  [phone]

## 2019-03-25 LAB
ANION GAP SERPL CALC-SCNC: 6 MMOL/L — SIGNIFICANT CHANGE UP (ref 5–17)
BUN SERPL-MCNC: 10 MG/DL — SIGNIFICANT CHANGE UP (ref 7–23)
CALCIUM SERPL-MCNC: 8.3 MG/DL — LOW (ref 8.5–10.1)
CHLORIDE SERPL-SCNC: 105 MMOL/L — SIGNIFICANT CHANGE UP (ref 96–108)
CO2 SERPL-SCNC: 31 MMOL/L — SIGNIFICANT CHANGE UP (ref 22–31)
CREAT SERPL-MCNC: 0.63 MG/DL — SIGNIFICANT CHANGE UP (ref 0.5–1.3)
GLUCOSE SERPL-MCNC: 95 MG/DL — SIGNIFICANT CHANGE UP (ref 70–99)
HCT VFR BLD CALC: 31.4 % — LOW (ref 34.5–45)
HGB BLD-MCNC: 10.4 G/DL — LOW (ref 11.5–15.5)
MCHC RBC-ENTMCNC: 31 PG — SIGNIFICANT CHANGE UP (ref 27–34)
MCHC RBC-ENTMCNC: 33.1 GM/DL — SIGNIFICANT CHANGE UP (ref 32–36)
MCV RBC AUTO: 93.7 FL — SIGNIFICANT CHANGE UP (ref 80–100)
NRBC # BLD: 0 /100 WBCS — SIGNIFICANT CHANGE UP (ref 0–0)
PLATELET # BLD AUTO: 310 K/UL — SIGNIFICANT CHANGE UP (ref 150–400)
POTASSIUM SERPL-MCNC: 3.9 MMOL/L — SIGNIFICANT CHANGE UP (ref 3.5–5.3)
POTASSIUM SERPL-SCNC: 3.9 MMOL/L — SIGNIFICANT CHANGE UP (ref 3.5–5.3)
RBC # BLD: 3.35 M/UL — LOW (ref 3.8–5.2)
RBC # FLD: 12.7 % — SIGNIFICANT CHANGE UP (ref 10.3–14.5)
SODIUM SERPL-SCNC: 142 MMOL/L — SIGNIFICANT CHANGE UP (ref 135–145)
WBC # BLD: 4.8 K/UL — SIGNIFICANT CHANGE UP (ref 3.8–10.5)
WBC # FLD AUTO: 4.8 K/UL — SIGNIFICANT CHANGE UP (ref 3.8–10.5)

## 2019-03-25 RX ORDER — SODIUM CHLORIDE 9 MG/ML
500 INJECTION INTRAMUSCULAR; INTRAVENOUS; SUBCUTANEOUS ONCE
Qty: 0 | Refills: 0 | Status: COMPLETED | OUTPATIENT
Start: 2019-03-25 | End: 2019-03-25

## 2019-03-25 RX ORDER — LACTOBACILLUS ACIDOPHILUS 100MM CELL
1 CAPSULE ORAL
Qty: 42 | Refills: 0
Start: 2019-03-25 | End: 2019-04-07

## 2019-03-25 RX ORDER — POLYETHYLENE GLYCOL 3350 17 G/17G
17 POWDER, FOR SOLUTION ORAL ONCE
Qty: 0 | Refills: 0 | Status: COMPLETED | OUTPATIENT
Start: 2019-03-25 | End: 2019-03-25

## 2019-03-25 RX ADMIN — POLYETHYLENE GLYCOL 3350 17 GRAM(S): 17 POWDER, FOR SOLUTION ORAL at 22:04

## 2019-03-25 RX ADMIN — OXYCODONE HYDROCHLORIDE 5 MILLIGRAM(S): 5 TABLET ORAL at 08:08

## 2019-03-25 RX ADMIN — PANTOPRAZOLE SODIUM 40 MILLIGRAM(S): 20 TABLET, DELAYED RELEASE ORAL at 12:52

## 2019-03-25 RX ADMIN — Medication 1 TABLET(S): at 12:47

## 2019-03-25 RX ADMIN — OXYCODONE HYDROCHLORIDE 5 MILLIGRAM(S): 5 TABLET ORAL at 22:04

## 2019-03-25 RX ADMIN — SODIUM CHLORIDE 250 MILLILITER(S): 9 INJECTION INTRAMUSCULAR; INTRAVENOUS; SUBCUTANEOUS at 15:00

## 2019-03-25 RX ADMIN — Medication 25 MICROGRAM(S): at 05:14

## 2019-03-25 RX ADMIN — Medication 100 MILLIGRAM(S): at 13:29

## 2019-03-25 RX ADMIN — Medication 1 TABLET(S): at 08:12

## 2019-03-25 RX ADMIN — ATORVASTATIN CALCIUM 10 MILLIGRAM(S): 80 TABLET, FILM COATED ORAL at 22:04

## 2019-03-25 RX ADMIN — OXYCODONE HYDROCHLORIDE 5 MILLIGRAM(S): 5 TABLET ORAL at 22:30

## 2019-03-25 RX ADMIN — Medication 1 TABLET(S): at 12:53

## 2019-03-25 RX ADMIN — Medication 100 MILLIGRAM(S): at 05:14

## 2019-03-25 RX ADMIN — SODIUM CHLORIDE 250 MILLILITER(S): 9 INJECTION INTRAMUSCULAR; INTRAVENOUS; SUBCUTANEOUS at 08:08

## 2019-03-25 RX ADMIN — ERTAPENEM SODIUM 120 MILLIGRAM(S): 1 INJECTION, POWDER, LYOPHILIZED, FOR SOLUTION INTRAMUSCULAR; INTRAVENOUS at 12:52

## 2019-03-25 RX ADMIN — Medication 100 MILLIGRAM(S): at 22:04

## 2019-03-25 RX ADMIN — ENOXAPARIN SODIUM 40 MILLIGRAM(S): 100 INJECTION SUBCUTANEOUS at 12:47

## 2019-03-25 NOTE — PROGRESS NOTE ADULT - SUBJECTIVE AND OBJECTIVE BOX
Patient is a 79y old  Female who presents with a chief complaint of s/p colon resection (24 Mar 2019 14:57)  feels well, without complaints  tolerating po.  no abd pain    INTERVAL HPI/OVERNIGHT EVENTS:  T(C): 37 (03-25-19 @ 10:24), Max: 37.7 (03-25-19 @ 07:23)  HR: 76 (03-25-19 @ 10:24) (72 - 78)  BP: 98/59 (03-25-19 @ 10:24) (96/64 - 110/60)  RR: 15 (03-25-19 @ 10:24) (15 - 16)  SpO2: 98% (03-25-19 @ 10:24) (96% - 99%)  Wt(kg): --  I&O's Summary      LABS:                        10.4   4.80  )-----------( 310      ( 25 Mar 2019 04:34 )             31.4     03-25    142  |  105  |  10  ----------------------------<  95  3.9   |  31  |  0.63    Ca    8.3<L>      25 Mar 2019 04:34          CAPILLARY BLOOD GLUCOSE                MEDICATIONS  (STANDING):  atorvastatin 10 milliGRAM(s) Oral at bedtime  docusate sodium 100 milliGRAM(s) Oral three times a day  enoxaparin Injectable 40 milliGRAM(s) SubCutaneous every 24 hours  ertapenem  IVPB 1000 milliGRAM(s) IV Intermittent every 24 hours  lactobacillus acidophilus 1 Tablet(s) Oral three times a day with meals  levothyroxine 25 MICROGram(s) Oral daily  pantoprazole  Injectable 40 milliGRAM(s) IV Push daily  sodium chloride 0.9% Bolus 500 milliLiter(s) IV Bolus once    MEDICATIONS  (PRN):  ALPRAZolam 0.25 milliGRAM(s) Oral every 6 hours PRN anxiety  artificial  tears Solution 1 Drop(s) Both EYES two times a day PRN Dry Eyes  morphine  - Injectable 4 milliGRAM(s) IV Push every 4 hours PRN Severe Pain (7 - 10)  naloxone Injectable 0.1 milliGRAM(s) IV Push every 3 minutes PRN For ANY of the following changes in patient status:  A. RR LESS THAN 10 breaths per minute, B. Oxygen saturation LESS THAN 90%, C. Sedation score of 6  ondansetron Injectable 4 milliGRAM(s) IV Push every 6 hours PRN Nausea and/or Vomiting  oxyCODONE    IR 5 milliGRAM(s) Oral every 4 hours PRN Mild Pain (1 - 3)  oxyCODONE    IR 10 milliGRAM(s) Oral every 4 hours PRN Moderate Pain (4 - 6)      REVIEW OF SYSTEMS:  CONSTITUTIONAL: No fever, weight loss, or fatigue  EYES: No eye pain, visual disturbances, or discharge  ENMT:  No difficulty hearing, tinnitus, vertigo; No sinus or throat pain  NECK: No pain or stiffness  RESPIRATORY: No cough, wheezing, chills or hemoptysis; No shortness of breath  CARDIOVASCULAR: No chest pain, palpitations, dizziness, or leg swelling  GASTROINTESTINAL: No abdominal or epigastric pain. No nausea, vomiting, or hematemesis; No diarrhea or constipation. No melena or hematochezia.  GENITOURINARY: No dysuria, frequency, hematuria, or incontinence  NEUROLOGICAL: No headaches, memory loss, loss of strength, numbness, or tremors  SKIN: No itching, burning, rashes, or lesions   LYMPH NODES: No enlarged glands  ENDOCRINE: No heat or cold intolerance; No hair loss  MUSCULOSKELETAL: No joint pain or swelling; No muscle, back, or extremity pain  PSYCHIATRIC: No depression, anxiety, mood swings, or difficulty sleeping  HEME/LYMPH: No easy bruising, or bleeding gums  ALLERY AND IMMUNOLOGIC: No hives or eczema    RADIOLOGY & ADDITIONAL TESTS:    Imaging Personally Reviewed:  [ ] YES  [ ] NO    Consultant(s) Notes Reviewed:  [ ] YES  [ ] NO    PHYSICAL EXAM:  GENERAL: NAD, well-groomed, well-developed  HEAD:  Atraumatic, Normocephalic  EYES: EOMI, PERRLA, conjunctiva and sclera clear  ENMT: No tonsillar erythema, exudates, or enlargement; Moist mucous membranes, Good dentition, No lesions  NECK: Supple, No JVD, Normal thyroid  NERVOUS SYSTEM:  Alert & Oriented X3, Good concentration; Motor Strength 5/5 B/L upper and lower extremities; DTRs 2+ intact and symmetric  CHEST/LUNG: Clear to percussion bilaterally; No rales, rhonchi, wheezing, or rubs  HEART: Regular rate and rhythm; No murmurs, rubs, or gallops  ABDOMEN: Soft, Nontender, Nondistended; Bowel sounds present  EXTREMITIES:  2+ Peripheral Pulses, No clubbing, cyanosis, or edema  LYMPH: No lymphadenopathy noted  SKIN: No rashes or lesions    Care Discussed with Consultants/Other Providers [ ] YES  [ ] NO

## 2019-03-25 NOTE — PROGRESS NOTE ADULT - ASSESSMENT
This is a 79 F comes with hypothyroidism, HLD, hx of diverticulitis, who presented to the ED with abdominal pain for 1 week, admitted for sigmoid diverticulitis. Patient now on Invanz (through 3/26), s/p POD#5 Florez procedure.     ACUTE SIGMOID DIVERTICULITIS WITHOUT PERFORATION  -s/p Melania.  ngt out, tolerating clears, off ivf.  stool in colostomy.  started on reg diet today. cont invanz through 3/26 per ID.  dc home in next 1-2 days per surgery.    HYPOTHYROID  Chronic issue. Continue synthroid.     DYSLIPIDEMIA  Chronic issue. Home medication pravastatin, atorvastatin while inpatient (therapeutic interchange).       Need for prophylactic measures:   -advance care planning discussed- pt to provide health care proxy w forms w appointed health care agent documented  -Continue home medications of restasis and systane for dry eye  -Lovenox for DVT prophylaxis

## 2019-03-25 NOTE — PROVIDER CONTACT NOTE (CHANGE IN STATUS NOTIFICATION) - ASSESSMENT
patient reinstructed In care of her ostomy:   Colostomy Education	  1.Demonstrates independence opening, emptying, and securing pouch closure  2.Educated in foods to add to diet increased proteins for healing and foods to avoid.  3.Educated to increase fluids 8 glasses of water daily  4.Demonstrates an understanding how to change entire appliance  5.Educated after showering or swimming to use hairdryer on low heat to flange

## 2019-03-25 NOTE — PROGRESS NOTE ADULT - SUBJECTIVE AND OBJECTIVE BOX
ID progress note    Name: MARIO HARRY  Age: 79y  Gender: Female  MRN: [id number]    Interval History-- Events noted, doing well, diet advanced to regular diet .   Notes reviewed    Past Medical History--  Diverticulosis  Hyperlipidemia  Hypothyroidism  H/O arthroscopic knee surgery  No significant past surgical history      For details regarding the patient's social history, family history, and other miscellaneous elements, please refer the initial infectious diseases consultation and/or the admitting history and physical examination for this admission.    Allergies--  Allergies    clindamycin (Stomach Upset)    Intolerances        Medications--  Antibiotics:  ertapenem  IVPB 1000 milliGRAM(s) IV Intermittent every 24 hours    Immunologic:    Other:  ALPRAZolam PRN  artificial  tears Solution PRN  atorvastatin  docusate sodium  enoxaparin Injectable  lactobacillus acidophilus  levothyroxine  morphine  - Injectable PRN  naloxone Injectable PRN  ondansetron Injectable PRN  oxyCODONE    IR PRN  oxyCODONE    IR PRN  pantoprazole  Injectable  sodium chloride 0.9% Bolus      Review of Systems--  A 10-point review of systems was obtained.     Pertinent positives and negatives--  Constitutional: No fevers. No Chills. No Rigors.   Cardiovascular: No chest pain. No palpitations.  Respiratory: No shortness of breath. No cough.  Gastrointestinal: No nausea or vomiting. No diarrhea or constipation.   Psychiatric: Pleasant. Appropriate affect.    Review of systems otherwise negative except as previously noted.    Physical Examination--  Vital Signs: T(F): 98.6 (03-25-19 @ 10:24), Max: 99.8 (03-25-19 @ 07:23)  HR: 76 (03-25-19 @ 10:24)  BP: 98/59 (03-25-19 @ 10:24)  RR: 15 (03-25-19 @ 10:24)  SpO2: 98% (03-25-19 @ 10:24)  Wt(kg): --  General: Nontoxic-appearing Female in no acute distress.  HEENT: AT/NC. PERRL. EOMI. Anicteric. Conjunctiva pink and moist. Oropharynx clear. Dentition fair.  Neck: Not rigid. No sense of mass.  Nodes: None palpable.  Lungs: Clear bilaterally without rales, wheezing or rhonchi  Heart: Regular rate and rhythm. No Murmur. No rub. No gallop. No palpable thrill.  Abdomen: Bowel sounds present and normoactive. Soft. Nondistended. Nontender. colostomy functional   Back: No spinal tenderness. No costovertebral angle tenderness.   Extremities: No cyanosis or clubbing. No edema.   Skin: Warm. Dry. Good turgor. No rash. No vasculitic stigmata.  Psychiatric: Appropriate affect and mood for situation.         Laboratory Studies--  CBC                        10.4   4.80  )-----------( 310      ( 25 Mar 2019 04:34 )             31.4       Chemistries  03-25    142  |  105  |  10  ----------------------------<  95  3.9   |  31  |  0.63    Ca    8.3<L>      25 Mar 2019 04:34        Culture Data    Culture - Surgical Swab (collected 20 Mar 2019 01:10)  Source: .Surgical Swab peritoneal fluid  Final Report (24 Mar 2019 19:55):    No growth at 5 days    RADIOLOGY:    CT Abdomen and Pelvis w/ Oral Cont and w/ IV Cont (03.16.19 @ 17:07) >  IMPRESSION:    Persistent abnormality in the region of the sigmoid colon without change   in appearance since the prior study. There are pericolonic inflammatory   changes in this region suggesting diverticulitis. No evidence of   associated abscess. There is now a small amount of ascites in the upper   abdomen adjacent to the liver which is a new finding since the prior   examination..        Assessment--    79 year old female hx of diverticulitis , last episode in Dec 2017, admitted with recurrent sigmoid diverticulitis not improved on po antibiotics x 6 days , with worsening abdominal pain and low grade fevers. Unfortunately no blood cs done . Now with increased WBC, low grade fever and worsening pain. Repeat ct scan of abdomen did not show any changes in the inflammatory reaction or abscess development .    She is POD # 6 ,  s/p left sigmoid colon resection with Florez's procedure , colostomy functional     Plan :   - will continue with  IV Invanz 1 gram daily  to end on 3/26/19  - diet advanced by  surgery   - incentive spirometry   - trend cbc  - increase activity   - colostomy teaching     Continue with present regime .  Appropriate use of antibiotics and adverse effects reviewed.    I have discussed the above plan of care with patient and her family in detail. They expressed understanding of the treatment plan . Risks, benefits and alternatives discussed in detail. I have asked if they have any questions or concerns and appropriately addressed them to the best of my ability .      > 35 minutes spent in direct patient care reviewing  the notes, lab data/ imaging , discussion with multidisciplinary team. All questions were addressed and answered to the best of my capacity .    Thank you for allowing me to participate in the care of your patient .        Phoebe Urban MD  [phone]

## 2019-03-25 NOTE — PROGRESS NOTE ADULT - SUBJECTIVE AND OBJECTIVE BOX
pt seen  feeling good  ambulating  ICU Vital Signs Last 24 Hrs  T(C): 37.7 (25 Mar 2019 07:23), Max: 37.7 (25 Mar 2019 07:23)  T(F): 99.8 (25 Mar 2019 07:23), Max: 99.8 (25 Mar 2019 07:23)  HR: 78 (25 Mar 2019 07:23) (72 - 78)  BP: 96/64 (25 Mar 2019 07:23) (96/64 - 110/60)  BP(mean): --  ABP: --  ABP(mean): --  RR: 16 (25 Mar 2019 07:23) (16 - 16)  SpO2: 96% (25 Mar 2019 07:23) (96% - 99%)  gen-NAD  resp-clear  abd-soft ND, NT, incision c/d/i  ostomy +fas, +stool                          10.4   4.80  )-----------( 310      ( 25 Mar 2019 04:34 )             31.4   03-25    142  |  105  |  10  ----------------------------<  95  3.9   |  31  |  0.63    Ca    8.3<L>      25 Mar 2019 04:34

## 2019-03-26 LAB
ANION GAP SERPL CALC-SCNC: 7 MMOL/L — SIGNIFICANT CHANGE UP (ref 5–17)
BUN SERPL-MCNC: 10 MG/DL — SIGNIFICANT CHANGE UP (ref 7–23)
CALCIUM SERPL-MCNC: 8.1 MG/DL — LOW (ref 8.5–10.1)
CHLORIDE SERPL-SCNC: 107 MMOL/L — SIGNIFICANT CHANGE UP (ref 96–108)
CO2 SERPL-SCNC: 28 MMOL/L — SIGNIFICANT CHANGE UP (ref 22–31)
CREAT SERPL-MCNC: 0.62 MG/DL — SIGNIFICANT CHANGE UP (ref 0.5–1.3)
GLUCOSE SERPL-MCNC: 93 MG/DL — SIGNIFICANT CHANGE UP (ref 70–99)
HCT VFR BLD CALC: 31.1 % — LOW (ref 34.5–45)
HGB BLD-MCNC: 10.4 G/DL — LOW (ref 11.5–15.5)
MCHC RBC-ENTMCNC: 31.5 PG — SIGNIFICANT CHANGE UP (ref 27–34)
MCHC RBC-ENTMCNC: 33.4 GM/DL — SIGNIFICANT CHANGE UP (ref 32–36)
MCV RBC AUTO: 94.2 FL — SIGNIFICANT CHANGE UP (ref 80–100)
NRBC # BLD: 0 /100 WBCS — SIGNIFICANT CHANGE UP (ref 0–0)
PLATELET # BLD AUTO: 296 K/UL — SIGNIFICANT CHANGE UP (ref 150–400)
POTASSIUM SERPL-MCNC: 3.9 MMOL/L — SIGNIFICANT CHANGE UP (ref 3.5–5.3)
POTASSIUM SERPL-SCNC: 3.9 MMOL/L — SIGNIFICANT CHANGE UP (ref 3.5–5.3)
RBC # BLD: 3.3 M/UL — LOW (ref 3.8–5.2)
RBC # FLD: 13 % — SIGNIFICANT CHANGE UP (ref 10.3–14.5)
SODIUM SERPL-SCNC: 142 MMOL/L — SIGNIFICANT CHANGE UP (ref 135–145)
WBC # BLD: 9.06 K/UL — SIGNIFICANT CHANGE UP (ref 3.8–10.5)
WBC # FLD AUTO: 9.06 K/UL — SIGNIFICANT CHANGE UP (ref 3.8–10.5)

## 2019-03-26 RX ADMIN — Medication 25 MICROGRAM(S): at 09:13

## 2019-03-26 RX ADMIN — OXYCODONE HYDROCHLORIDE 5 MILLIGRAM(S): 5 TABLET ORAL at 14:44

## 2019-03-26 RX ADMIN — Medication 100 MILLIGRAM(S): at 18:16

## 2019-03-26 RX ADMIN — Medication 100 MILLIGRAM(S): at 09:14

## 2019-03-26 RX ADMIN — Medication 1 TABLET(S): at 10:08

## 2019-03-26 RX ADMIN — Medication 1 TABLET(S): at 18:15

## 2019-03-26 RX ADMIN — OXYCODONE HYDROCHLORIDE 5 MILLIGRAM(S): 5 TABLET ORAL at 08:51

## 2019-03-26 RX ADMIN — ENOXAPARIN SODIUM 40 MILLIGRAM(S): 100 INJECTION SUBCUTANEOUS at 12:12

## 2019-03-26 RX ADMIN — PANTOPRAZOLE SODIUM 40 MILLIGRAM(S): 20 TABLET, DELAYED RELEASE ORAL at 12:12

## 2019-03-26 RX ADMIN — Medication 100 MILLIGRAM(S): at 21:09

## 2019-03-26 RX ADMIN — ATORVASTATIN CALCIUM 10 MILLIGRAM(S): 80 TABLET, FILM COATED ORAL at 21:08

## 2019-03-26 RX ADMIN — Medication 0.25 MILLIGRAM(S): at 22:46

## 2019-03-26 RX ADMIN — Medication 1 TABLET(S): at 12:13

## 2019-03-26 NOTE — PROGRESS NOTE ADULT - SUBJECTIVE AND OBJECTIVE BOX
Patient is a 79y old  Female who presents with a chief complaint of s/p colon resection (24 Mar 2019 14:57)      INTERVAL HPI/OVERNIGHT EVENTS: Patient seen and examined at bedside. Denies N/V. Tolerating regular diet. Stool in colostomy bag.  Denies any new complaints.        T(C): 37.1 (03-26-19 @ 07:31), Max: 37.7 (03-25-19 @ 20:24)  HR: 74 (03-26-19 @ 07:31) (74 - 85)  BP: 116/69 (03-26-19 @ 07:31) (98/59 - 116/69)  RR: 16 (03-26-19 @ 07:31) (14 - 16)  SpO2: 95% (03-26-19 @ 07:31) (95% - 98%)  Wt(kg): --  I&O's Summary      LABS:                        10.4   9.06  )-----------( 296      ( 26 Mar 2019 07:22 )             31.1     03-26    142  |  107  |  10  ----------------------------<  93  3.9   |  28  |  0.62    Ca    8.1<L>      26 Mar 2019 07:22          CAPILLARY BLOOD GLUCOSE      MEDICATIONS  (STANDING):  atorvastatin 10 milliGRAM(s) Oral at bedtime  docusate sodium 100 milliGRAM(s) Oral three times a day  enoxaparin Injectable 40 milliGRAM(s) SubCutaneous every 24 hours  lactobacillus acidophilus 1 Tablet(s) Oral three times a day with meals  levothyroxine 25 MICROGram(s) Oral daily  pantoprazole  Injectable 40 milliGRAM(s) IV Push daily    MEDICATIONS  (PRN):  ALPRAZolam 0.25 milliGRAM(s) Oral every 6 hours PRN anxiety  artificial  tears Solution 1 Drop(s) Both EYES two times a day PRN Dry Eyes  morphine  - Injectable 4 milliGRAM(s) IV Push every 4 hours PRN Severe Pain (7 - 10)  naloxone Injectable 0.1 milliGRAM(s) IV Push every 3 minutes PRN For ANY of the following changes in patient status:  A. RR LESS THAN 10 breaths per minute, B. Oxygen saturation LESS THAN 90%, C. Sedation score of 6  ondansetron Injectable 4 milliGRAM(s) IV Push every 6 hours PRN Nausea and/or Vomiting  oxyCODONE    IR 5 milliGRAM(s) Oral every 4 hours PRN Mild Pain (1 - 3)  oxyCODONE    IR 10 milliGRAM(s) Oral every 4 hours PRN Moderate Pain (4 - 6)      REVIEW OF SYSTEMS:  CONSTITUTIONAL: No fever, weight loss, or fatigue  EYES: No eye pain, visual disturbances, or discharge  RESPIRATORY: No cough, wheezing, chills or hemoptysis; No shortness of breath  CARDIOVASCULAR: No chest pain, palpitations, dizziness, or leg swelling  GASTROINTESTINAL: minimal occasional cramping abdominal, no epigastric pain. No nausea, vomiting, or hematemesis  GENITOURINARY: No dysuria, frequency, hematuria, or incontinence  NEUROLOGICAL: No headaches, memory loss, loss of strength, numbness, or tremors      RADIOLOGY & ADDITIONAL TESTS: NONE     Imaging Personally Reviewed:  [ ] YES  [ ] NO    Consultant(s) Notes Reviewed:  [X ] YES  [ ] NO    PHYSICAL EXAM:  GENERAL: NAD, well-groomed, well-developed  HEAD:  Atraumatic, Normocephalic  EYES: EOMI, PERRL, conjunctiva and sclera clear  ENMT: No tonsillar erythema, exudates, or enlargement; Moist mucous membranes, Good dentition, No lesions  NERVOUS SYSTEM:  Alert & Oriented X3, Good concentration; nonfocal  CHEST/LUNG: clear to auscultation bilaterally, mildly diminished bibasilar breath sounds  HEART: Regular rate and rhythm; No murmurs, rubs, or gallops  ABDOMEN: Soft, surgical site clean, dry, stool in colostomy bag  EXTREMITIES:  No clubbing, cyanosis; trace LE edema, nontender to palpation    Care Discussed with Consultants/Other Providers [ ] YES  [ ] NO

## 2019-03-26 NOTE — PROGRESS NOTE ADULT - ASSESSMENT
This is a 79 F comes with hypothyroidism, HLD, hx of diverticulitis, who presented to the ED with abdominal pain for 1 week, admitted for sigmoid diverticulitis. Patient now on Invanz (through 3/26), s/p POD#7 Florez procedure.     ACUTE SIGMOID DIVERTICULITIS WITHOUT PERFORATION  -s/p Melania. POD #7. ngt out, tolerating regular diet,  stool in colostomy.   -S/p 3 days of Invanz   -ID (Dr. WINIFRED Urban) following, f/u recs   -surgery (Dr. Spencer) following, f/u recs     HYPOTHYROID  Chronic issue. Continue synthroid.     DYSLIPIDEMIA  Chronic issue. Home medication pravastatin, atorvastatin while inpatient (therapeutic interchange).       Need for prophylactic measures:   -advance care planning discussed- pt to provide health care proxy w forms w appointed health care agent documented  -Continue home medications of restasis and systane for dry eye  -Lovenox for DVT prophylaxis

## 2019-03-26 NOTE — PROGRESS NOTE ADULT - SUBJECTIVE AND OBJECTIVE BOX
ID progress note     Name: MARIO HARRY  Age: 79y  Gender: Female  MRN: [id number]    Interval History-- Events noted, doing well, now off antibiotics . Tolerating diet, colostomy functional with stool   Notes reviewed    Past Medical History--  Diverticulosis  Hyperlipidemia  Hypothyroidism  H/O arthroscopic knee surgery  No significant past surgical history      For details regarding the patient's social history, family history, and other miscellaneous elements, please refer the initial infectious diseases consultation and/or the admitting history and physical examination for this admission.    Allergies--  Allergies    clindamycin (Stomach Upset)    Intolerances        Medications--  Antibiotics:    Immunologic:    Other:  ALPRAZolam PRN  artificial  tears Solution PRN  atorvastatin  docusate sodium  enoxaparin Injectable  lactobacillus acidophilus  levothyroxine  morphine  - Injectable PRN  naloxone Injectable PRN  ondansetron Injectable PRN  oxyCODONE    IR PRN  oxyCODONE    IR PRN  pantoprazole  Injectable      Review of Systems--  A 10-point review of systems was obtained.     Pertinent positives and negatives--  Constitutional: No fevers. No Chills. No Rigors.   Cardiovascular: No chest pain. No palpitations.  Respiratory: No shortness of breath. No cough.  Gastrointestinal: No nausea or vomiting. No diarrhea or constipation.   Psychiatric: Pleasant. Appropriate affect.    Review of systems otherwise negative except as previously noted.    Physical Examination--  Vital Signs: T(F): 98.8 (03-26-19 @ 07:31), Max: 99.9 (03-25-19 @ 20:24)  HR: 74 (03-26-19 @ 07:31)  BP: 116/69 (03-26-19 @ 07:31)  RR: 16 (03-26-19 @ 07:31)  SpO2: 95% (03-26-19 @ 07:31)  Wt(kg): --  General: Nontoxic-appearing Female in no acute distress.  HEENT: AT/NC. PERRL. EOMI. Anicteric. Conjunctiva pink and moist. Oropharynx clear. Dentition fair.  Neck: Not rigid. No sense of mass.  Nodes: None palpable.  Lungs: Clear bilaterally without rales, wheezing or rhonchi  Heart: Regular rate and rhythm. No Murmur. No rub. No gallop. No palpable thrill.  Abdomen: Bowel sounds present and normoactive. Soft. Nondistended. Mild incisional tender. left colostomy  No organomegaly.  Back: No spinal tenderness. No costovertebral angle tenderness.   Extremities: No cyanosis or clubbing. No edema.   Skin: Warm. Dry. Good turgor. No rash. No vasculitic stigmata.  Psychiatric: Appropriate affect and mood for situation.         Laboratory Studies--  CBC                        10.4   9.06  )-----------( 296      ( 26 Mar 2019 07:22 )             31.1       Chemistries  03-26    142  |  107  |  10  ----------------------------<  93  3.9   |  28  |  0.62    Ca    8.1<L>      26 Mar 2019 07:22        Culture Data    Culture - Surgical Swab (collected 20 Mar 2019 01:10)  Source: .Surgical Swab peritoneal fluid  Final Report (24 Mar 2019 19:55):    No growth at 5 days      RADIOLOGY:  CT Abdomen and Pelvis w/ Oral Cont and w/ IV Cont (03.16.19 @ 17:07) >  IMPRESSION:    Persistent abnormality in the region of the sigmoid colon without change   in appearance since the prior study. There are pericolonic inflammatory   changes in this region suggesting diverticulitis. No evidence of   associated abscess. There is now a small amount of ascites in the upper   abdomen adjacent to the liver which is a new finding since the prior   examination..        Assessment--    79 year old female hx of diverticulitis , last episode in Dec 2017, admitted with recurrent sigmoid diverticulitis not improved on po antibiotics x 6 days , with worsening abdominal pain and low grade fevers. Unfortunately no blood cs done . Now with increased WBC, low grade fever and worsening pain. Repeat ct scan of abdomen did not show any changes in the inflammatory reaction or abscess development .    She is POD # 7 ,  s/p left sigmoid colon resection with Florez's procedure , colostomy functional     Plan :   - will observe off antibiotics   - diet advanced by  surgery   - incentive spirometry   - trend cbc  - increase activity   - colostomy teaching   - dc planning     Continue with present regime .  Appropriate use of antibiotics and adverse effects reviewed.    I have discussed the above plan of care with patient/family in detail. They expressed understanding of the treatment plan . Risks, benefits and alternatives discussed in detail. I have asked if they have any questions or concerns and appropriately addressed them to the best of my ability .      > 35 minutes spent in direct patient care reviewing  the notes, lab data/ imaging , discussion with multidisciplinary team. All questions were addressed and answered to the best of my capacity .    Thank you for allowing me to participate in the care of your patient .        Phoebe Urban MD  [phone]

## 2019-03-26 NOTE — PROGRESS NOTE ADULT - SUBJECTIVE AND OBJECTIVE BOX
pt seen  doing well  no complaints  ambulating  voiding  minimal pain  ICU Vital Signs Last 24 Hrs  T(C): 37.1 (26 Mar 2019 07:31), Max: 37.7 (25 Mar 2019 20:24)  T(F): 98.8 (26 Mar 2019 07:31), Max: 99.9 (25 Mar 2019 20:24)  HR: 74 (26 Mar 2019 07:31) (74 - 85)  BP: 116/69 (26 Mar 2019 07:31) (99/60 - 116/69)  BP(mean): --  ABP: --  ABP(mean): --  RR: 16 (26 Mar 2019 07:31) (14 - 16)  SpO2: 95% (26 Mar 2019 07:31) (95% - 96%)  NAD  resp-clear  abd-soft NT/ND  wound clean, repacked  ostomy pink, +stool                          10.4   9.06  )-----------( 296      ( 26 Mar 2019 07:22 )             31.1   03-26    142  |  107  |  10  ----------------------------<  93  3.9   |  28  |  0.62    Ca    8.1<L>      26 Mar 2019 07:22    Surgical Pathology Report (03.19.19 @ 06:42)    Surgical Pathology Report:   ACCESSION No:  [id number]    MARIO HARRY M                    2        Surgical Final Report          Final Diagnosis    Sigmoid colon, segmental resection:  -Diverticulosis with multiple diverticula and multifocal  diverticular  disease-associated colitis, accompanied by acute and  chronic diverticulitis and  mucosal ulceration.  -Focal severe acute diverticulitis with rupture and  perforation into subserosa  and non-peritonealized pericolonic adipose tissue, with  extrusion of fecal  material, accompanied by abscess formation, acute and  chronically inflamed  granulation tissue, fibrosis and fat necrosis.  -Acute and chronic serositis with inflammatory exudate.  -Uninvolved colonic mucosa showing focal hyperplastic  change.  -Benign, viable surgical resection margins are  demonstrating mild acute and  chronic serositis (both segments) with focal hyperplastic  change.  -Fifteen benign lymph nodes identified.    Verified by: Harsh Mclaughlin M.D.  (Electronic Signature)  Reported on: 03/25/19 12:26 EDT, [address] [place] [address]  _________________________________________________________________    Clinical History  Procedure: Exploratory laparotomy, Melania's procedure    Specimen(s) Submitted  Sigmoid colon    Gross Description  The specimen is received in formalin and the specimen container  is labeled: Sigmoid colon. It consists of a partially incised  segment of colon measuring 27.0 cm in length x 2.7 up to 4.0 cm  in diameter. The intestinal serosa is gray-tan, dull and  extensively surfaced by gray-white to pale yellow exudate. The  attached pericolic soft tissue measures 3.7 cm in width. The  previously incised area is inked green and on sectioning  communicated with a large diverticulum. The colon is opened; the  lumen contains fecal material. The specimen shows multiple deep  flask-shaped diverticula. The wall is corrugated and thickened.  The remaining mucosa is tan with focal areas showing            BAGNASCO, MARIO M 2        Surgical Final Report          effacement of the normal folds. The pericolic soft tissue shows  multiple lymph nodes. Separately received is a shorter segment of  colon measuring 4.2 cm in length x 2.5 in diameter. The  intestinal serosa isgray-tan to brown and dull. Upon opening,  there are a few small diverticula noted. The wall is focally  corrugated and thickened. The remaining mucosa is tan-brown to  pale yellow, nodular and finely granular. The specimen is  representatively submitted. 15 cassettes.  A,B - margins of resection, longer segment (one perpendicular  section from each end)  C-E - full cross section of previously incised area, longer  segment  F-J  - diverticula, longer segment  K,L - lymph nodes  M - margins of resection, shorter segment (perpendicular  sections)  N,O - diverticula, shorter segment    In addition to other data that may appear on the specimen  container, the label has been inspected to confirm the presence  of the patient's name and date of birth.  DN 3/22/2019 7:51 AM    Perioperative Diagnosis  Diverticulitis    Postoperative Diagnosis  Same

## 2019-03-27 LAB
ANION GAP SERPL CALC-SCNC: 7 MMOL/L — SIGNIFICANT CHANGE UP (ref 5–17)
BUN SERPL-MCNC: 10 MG/DL — SIGNIFICANT CHANGE UP (ref 7–23)
CALCIUM SERPL-MCNC: 8.3 MG/DL — LOW (ref 8.5–10.1)
CHLORIDE SERPL-SCNC: 103 MMOL/L — SIGNIFICANT CHANGE UP (ref 96–108)
CO2 SERPL-SCNC: 31 MMOL/L — SIGNIFICANT CHANGE UP (ref 22–31)
CREAT SERPL-MCNC: 0.73 MG/DL — SIGNIFICANT CHANGE UP (ref 0.5–1.3)
GLUCOSE SERPL-MCNC: 93 MG/DL — SIGNIFICANT CHANGE UP (ref 70–99)
HCT VFR BLD CALC: 30.7 % — LOW (ref 34.5–45)
HGB BLD-MCNC: 10 G/DL — LOW (ref 11.5–15.5)
MCHC RBC-ENTMCNC: 30.9 PG — SIGNIFICANT CHANGE UP (ref 27–34)
MCHC RBC-ENTMCNC: 32.6 GM/DL — SIGNIFICANT CHANGE UP (ref 32–36)
MCV RBC AUTO: 94.8 FL — SIGNIFICANT CHANGE UP (ref 80–100)
NRBC # BLD: 0 /100 WBCS — SIGNIFICANT CHANGE UP (ref 0–0)
PLATELET # BLD AUTO: 293 K/UL — SIGNIFICANT CHANGE UP (ref 150–400)
POTASSIUM SERPL-MCNC: 3.7 MMOL/L — SIGNIFICANT CHANGE UP (ref 3.5–5.3)
POTASSIUM SERPL-SCNC: 3.7 MMOL/L — SIGNIFICANT CHANGE UP (ref 3.5–5.3)
RBC # BLD: 3.24 M/UL — LOW (ref 3.8–5.2)
RBC # FLD: 12.7 % — SIGNIFICANT CHANGE UP (ref 10.3–14.5)
SODIUM SERPL-SCNC: 141 MMOL/L — SIGNIFICANT CHANGE UP (ref 135–145)
WBC # BLD: 8.24 K/UL — SIGNIFICANT CHANGE UP (ref 3.8–10.5)
WBC # FLD AUTO: 8.24 K/UL — SIGNIFICANT CHANGE UP (ref 3.8–10.5)

## 2019-03-27 RX ORDER — OXYCODONE HYDROCHLORIDE 5 MG/1
10 TABLET ORAL EVERY 4 HOURS
Qty: 0 | Refills: 0 | Status: DISCONTINUED | OUTPATIENT
Start: 2019-03-27 | End: 2019-03-29

## 2019-03-27 RX ORDER — PANTOPRAZOLE SODIUM 20 MG/1
40 TABLET, DELAYED RELEASE ORAL DAILY
Qty: 0 | Refills: 0 | Status: DISCONTINUED | OUTPATIENT
Start: 2019-03-27 | End: 2019-03-29

## 2019-03-27 RX ORDER — ACETAMINOPHEN 500 MG
500 TABLET ORAL ONCE
Qty: 0 | Refills: 0 | Status: COMPLETED | OUTPATIENT
Start: 2019-03-27 | End: 2019-03-27

## 2019-03-27 RX ORDER — OXYCODONE HYDROCHLORIDE 5 MG/1
5 TABLET ORAL EVERY 4 HOURS
Qty: 0 | Refills: 0 | Status: DISCONTINUED | OUTPATIENT
Start: 2019-03-27 | End: 2019-03-29

## 2019-03-27 RX ADMIN — Medication 100 MILLIGRAM(S): at 05:37

## 2019-03-27 RX ADMIN — Medication 100 MILLIGRAM(S): at 21:13

## 2019-03-27 RX ADMIN — Medication 1 TABLET(S): at 08:05

## 2019-03-27 RX ADMIN — Medication 0.25 MILLIGRAM(S): at 21:13

## 2019-03-27 RX ADMIN — ENOXAPARIN SODIUM 40 MILLIGRAM(S): 100 INJECTION SUBCUTANEOUS at 13:22

## 2019-03-27 RX ADMIN — OXYCODONE HYDROCHLORIDE 10 MILLIGRAM(S): 5 TABLET ORAL at 18:08

## 2019-03-27 RX ADMIN — Medication 25 MICROGRAM(S): at 05:37

## 2019-03-27 RX ADMIN — Medication 1 TABLET(S): at 13:22

## 2019-03-27 RX ADMIN — ATORVASTATIN CALCIUM 10 MILLIGRAM(S): 80 TABLET, FILM COATED ORAL at 21:13

## 2019-03-27 RX ADMIN — Medication 500 MILLIGRAM(S): at 13:22

## 2019-03-27 RX ADMIN — Medication 1 TABLET(S): at 18:08

## 2019-03-27 NOTE — PROGRESS NOTE ADULT - SUBJECTIVE AND OBJECTIVE BOX
pt seen  slight pain  -n-v  tolerating diet  ICU Vital Signs Last 24 Hrs  T(C): 36.7 (27 Mar 2019 08:06), Max: 37.7 (26 Mar 2019 23:15)  T(F): 98 (27 Mar 2019 08:06), Max: 99.8 (26 Mar 2019 23:15)  HR: 76 (27 Mar 2019 08:06) (76 - 80)  BP: 104/64 (27 Mar 2019 08:06) (103/64 - 112/66)  BP(mean): --  ABP: --  ABP(mean): --  RR: 16 (27 Mar 2019 08:06) (15 - 16)  SpO2: 92% (27 Mar 2019 08:06) (92% - 93%)  gen-NAD  resp-clear  abd-soft NT/ND, incision clean, ostomy functioning                          10.0   8.24  )-----------( 293      ( 27 Mar 2019 06:58 )             30.7

## 2019-03-27 NOTE — PROGRESS NOTE ADULT - SUBJECTIVE AND OBJECTIVE BOX
ID progress note     Name: MARIO HARRY  Age: 79y  Gender: Female  MRN: [id number]    Interval History-- Events noted , doing well , tolerating diet well. Going to [facility] as too weak . Afebrile off abx   Notes reviewed    Past Medical History--  Diverticulosis  Hyperlipidemia  Hypothyroidism  H/O arthroscopic knee surgery  No significant past surgical history      For details regarding the patient's social history, family history, and other miscellaneous elements, please refer the initial infectious diseases consultation and/or the admitting history and physical examination for this admission.    Allergies--  Allergies    clindamycin (Stomach Upset)    Intolerances        Medications--  Antibiotics:    Immunologic:    Other:  ALPRAZolam PRN  artificial  tears Solution PRN  atorvastatin  docusate sodium  enoxaparin Injectable  lactobacillus acidophilus  levothyroxine  naloxone Injectable PRN  ondansetron Injectable PRN  oxyCODONE    IR PRN  oxyCODONE    IR PRN  pantoprazole    Tablet      Review of Systems--  A 10-point review of systems was obtained.     Pertinent positives and negatives--  Constitutional: No fevers. No Chills. No Rigors.   Cardiovascular: No chest pain. No palpitations.  Respiratory: No shortness of breath. No cough.  Gastrointestinal: No nausea or vomiting. No diarrhea or constipation.   Psychiatric: Pleasant. Appropriate affect.    Review of systems otherwise negative except as previously noted.    Physical Examination--  Vital Signs: T(F): 98 (03-27-19 @ 08:06), Max: 99.8 (03-26-19 @ 23:15)  HR: 76 (03-27-19 @ 08:06)  BP: 104/64 (03-27-19 @ 08:06)  RR: 16 (03-27-19 @ 08:06)  SpO2: 92% (03-27-19 @ 08:06)  Wt(kg): --  General: Nontoxic-appearing Female in no acute distress.  HEENT: AT/NC. PERRL. EOMI. Anicteric. Conjunctiva pink and moist. Oropharynx clear. Dentition fair.  Neck: Not rigid. No sense of mass.  Nodes: None palpable.  Lungs: Clear bilaterally without rales, wheezing or rhonchi  Heart: Regular rate and rhythm. No Murmur. No rub. No gallop. No palpable thrill.  Abdomen: Bowel sounds present and normoactive. Soft. Nondistended. mild incisional tenderness , left colostomy functional   Back: No spinal tenderness. No costovertebral angle tenderness.   Extremities: No cyanosis or clubbing. No edema.   Skin: Warm. Dry. Good turgor. No rash. No vasculitic stigmata.  Psychiatric: Appropriate affect and mood for situation.         Laboratory Studies--  CBC                        10.0   8.24  )-----------( 293      ( 27 Mar 2019 06:58 )             30.7       Chemistries  03-27    141  |  103  |  10  ----------------------------<  93  3.7   |  31  |  0.73    Ca    8.3<L>      27 Mar 2019 06:58    Surgical Pathology Report (03.19.19 @ 06:42)    Surgical Pathology Report:   ACCESSION No:  [id number]    MARIO HARRY M                    2  Surgical Final Report  Final Diagnosis    Sigmoid colon, segmental resection:  -Diverticulosis with multiple diverticula and multifocal  diverticular disease-associated colitis, accompanied by acute and  chronic diverticulitis and mucosal ulceration.  -Focal severe acute diverticulitis with rupture and  perforation into subserosa  and non-peritonealized pericolonic adipose tissue, with  extrusion of fecal material, accompanied by abscess formation, acute and  chronically inflamed granulation tissue, fibrosis and fat necrosis.  -Acute and chronic serositis with inflammatory exudate.  -Uninvolved colonic mucosa showing focal hyperplastic  change.  -Benign, viable surgical resection margins are  demonstrating mild acute and chronic serositis (both segments)   with focal hyperplastic change.  -Fifteen benign lymph nodes identified.    Verified by: Harsh Mclaughlin M.D.  (Electronic Signature)  Reported on: 03/25/19 12:26 EDT, [address] [place] [address]    RADIOLOGY:    CT Abdomen and Pelvis w/ Oral Cont and w/ IV Cont (03.16.19 @ 17:07) >  IMPRESSION:    Persistent abnormality in the region of the sigmoid colon without change   in appearance since the prior study. There are pericolonic inflammatory   changes in this region suggesting diverticulitis. No evidence of   associated abscess. There is now a small amount of ascites in the upper   abdomen adjacent to the liver which is a new finding since the prior   examination..        Assessment--    79 year old female hx of diverticulitis , last episode in Dec 2017, admitted with recurrent sigmoid diverticulitis not improved on po antibiotics x 6 days , with worsening abdominal pain and low grade fevers. Unfortunately no blood cs done . Now with increased WBC, low grade fever and worsening pain. Repeat ct scan of abdomen did not show any changes in the inflammatory reaction or abscess development .    She is POD # 7 ,  s/p left sigmoid colon resection with Florez's procedure , colostomy functional     Pathology results noted     Plan :   - will observe off antibiotics   - diet advanced by  surgery   - incentive spirometry   - trend cbc  - increase activity   - colostomy teaching   - dc planning to LONG    Continue with present regime .  Appropriate use of antibiotics and adverse effects reviewed.    I have discussed the above plan of care with patient and her family in detail. They expressed understanding of the treatment plan . Risks, benefits and alternatives discussed in detail. I have asked if they have any questions or concerns and appropriately addressed them to the best of my ability .      > 15 minutes spent in direct patient care reviewing  the notes, lab data/ imaging , discussion with multidisciplinary team. All questions were addressed and answered to the best of my capacity .    Thank you for allowing me to participate in the care of your patient .        Phoebe Urban MD  [phone]

## 2019-03-27 NOTE — PROGRESS NOTE ADULT - ASSESSMENT
This is a 79 F comes with hypothyroidism, HLD, hx of diverticulitis, who presented to the ED with abdominal pain for 1 week, admitted for sigmoid diverticulitis. Patient now on Invanz (through 3/26), s/p POD#8 Florez procedure.     ACUTE SIGMOID DIVERTICULITIS WITHOUT PERFORATION  -s/p Melania. POD #8. tolerating regular diet, stool in colostomy.   -S/p 3 days of Invanz, off antibiotics   -ID (Dr. WINIFRED Urban) following, f/u recs   -surgery (Dr. Spencer) following, f/u recs   -D/C planning for tomorrow, seen by PT, now for rehab    HYPOTHYROID  Chronic issue. Continue synthroid.     DYSLIPIDEMIA  Chronic issue. Home medication pravastatin, atorvastatin while inpatient (therapeutic interchange).       Need for prophylactic measures:   -advance care planning discussed- pt to provide health care proxy w forms w appointed health care agent documented  -Continue home medications of restasis and systane for dry eye  -Lovenox for DVT prophylaxis This is a 79 F comes with hypothyroidism, HLD, hx of diverticulitis, who presented to the ED with abdominal pain for 1 week, admitted for sigmoid diverticulitis. Patient now on Invanz (through 3/26), s/p POD#8 Florez procedure.     ACUTE SIGMOID DIVERTICULITIS WITHOUT PERFORATION  -s/p Melania. POD #8. tolerating regular diet, stool in colostomy.   -S/p 3 days of Invanz, off antibiotics   -continue with pain management   -ID (Dr. WINIFRED Urban) following, f/u recs   -surgery (Dr. Spencer) following, f/u recs   -D/C planning for tomorrow, seen by PT, now for rehab    HYPOTHYROID  Chronic issue. Continue synthroid.     DYSLIPIDEMIA  Chronic issue. Home medication pravastatin, atorvastatin while inpatient (therapeutic interchange).       Need for prophylactic measures:   -advance care planning discussed- pt to provide health care proxy w forms w appointed health care agent documented  -Continue home medications of restasis and systane for dry eye  -Lovenox for DVT prophylaxis

## 2019-03-27 NOTE — PROGRESS NOTE ADULT - SUBJECTIVE AND OBJECTIVE BOX
Patient is a 79y old  Female who presents with a chief complaint of diverticulitis (26 Mar 2019 09:08)      INTERVAL HPI/OVERNIGHT EVENTS: Patient seen and examined at bedside. Denies N/V. Tolerating regular diet. Stool in colostomy bag.  Denies any new complaints.  Admits to occasional abdominal pain.      T(C): 36.7 (03-27-19 @ 08:06), Max: 37.7 (03-26-19 @ 23:15)  HR: 76 (03-27-19 @ 08:06) (76 - 80)  BP: 104/64 (03-27-19 @ 08:06) (103/64 - 112/66)  RR: 16 (03-27-19 @ 08:06) (15 - 16)  SpO2: 92% (03-27-19 @ 08:06) (92% - 93%)  Wt(kg): --  I&O's Summary      LABS:                        10.0   8.24  )-----------( 293      ( 27 Mar 2019 06:58 )             30.7     03-27    141  |  103  |  10  ----------------------------<  93  3.7   |  31  |  0.73    Ca    8.3<L>      27 Mar 2019 06:58          CAPILLARY BLOOD GLUCOSE      MEDICATIONS  (STANDING):  acetaminophen   Tablet .. 500 milliGRAM(s) Oral once  atorvastatin 10 milliGRAM(s) Oral at bedtime  docusate sodium 100 milliGRAM(s) Oral three times a day  enoxaparin Injectable 40 milliGRAM(s) SubCutaneous every 24 hours  lactobacillus acidophilus 1 Tablet(s) Oral three times a day with meals  levothyroxine 25 MICROGram(s) Oral daily  pantoprazole  Injectable 40 milliGRAM(s) IV Push daily    MEDICATIONS  (PRN):  ALPRAZolam 0.25 milliGRAM(s) Oral every 6 hours PRN anxiety  artificial  tears Solution 1 Drop(s) Both EYES two times a day PRN Dry Eyes  naloxone Injectable 0.1 milliGRAM(s) IV Push every 3 minutes PRN For ANY of the following changes in patient status:  A. RR LESS THAN 10 breaths per minute, B. Oxygen saturation LESS THAN 90%, C. Sedation score of 6  ondansetron Injectable 4 milliGRAM(s) IV Push every 6 hours PRN Nausea and/or Vomiting  oxyCODONE    IR 5 milliGRAM(s) Oral every 4 hours PRN Mild Pain (1 - 3)  oxyCODONE    IR 10 milliGRAM(s) Oral every 4 hours PRN Moderate Pain (4 - 6)      REVIEW OF SYSTEMS:  CONSTITUTIONAL: No fever, weight loss, or fatigue  EYES: No eye pain, visual disturbances, or discharge  RESPIRATORY: No cough, wheezing, chills or hemoptysis; No shortness of breath  CARDIOVASCULAR: No chest pain, palpitations, dizziness, or leg swelling  GASTROINTESTINAL: minimal occasional  abdominal pain, no epigastric pain. No nausea, vomiting, or hematemesis  GENITOURINARY: No dysuria, frequency, hematuria, or incontinence  NEUROLOGICAL: No headaches, memory loss, loss of strength, numbness, or tremors    RADIOLOGY & ADDITIONAL TESTS: NONE     Imaging Personally Reviewed:  [ ] YES  [ ] NO    Consultant(s) Notes Reviewed:  [X ] YES  [ ] NO    PHYSICAL EXAM:  GENERAL: NAD, well-groomed, well-developed  HEAD:  Atraumatic, Normocephalic  EYES: EOMI, PERRL, conjunctiva and sclera clear  ENMT: No tonsillar erythema, exudates, or enlargement; Moist mucous membranes, Good dentition, No lesions  NERVOUS SYSTEM:  Alert & Oriented X3, Good concentration; nonfocal  CHEST/LUNG: clear to auscultation bilaterally, mildly diminished bibasilar breath sounds  HEART: Regular rate and rhythm; No murmurs, rubs, or gallops  ABDOMEN: Soft, surgical site clean, dry, stool in colostomy bag  EXTREMITIES:  No clubbing, cyanosis; trace LE edema, nontender to palpation      Care Discussed with Consultants/Other Providers [ ] YES  [ ] NO

## 2019-03-27 NOTE — CHART NOTE - NSCHARTNOTEFT_GEN_A_CORE
Assessment: POD#3 post Hartmans procedure 2/2 diverticulitis. NGT clamped. Pt seen by surgery this morning. Possible clears today.     Factors impacting intake: [ ] none [ ] nausea  [ ] vomiting [ ] diarrhea [ ] constipation  [ ]chewing problems [ ] swallowing issues  [x] other: NPO status    Diet Presciption: Diet, NPO:   Except Medications  With Ice Chips/Sips of Water (03-22-19 @ 11:31)    Intake: NPO    Current Weight: no new wt available    Pertinent Medications: MEDICATIONS  (STANDING):  atorvastatin 10 milliGRAM(s) Oral at bedtime  dextrose 5% + sodium chloride 0.45%. 1000 milliLiter(s) (75 mL/Hr) IV Continuous <Continuous>  enoxaparin Injectable 40 milliGRAM(s) SubCutaneous every 24 hours  lactobacillus acidophilus 1 Tablet(s) Oral three times a day with meals  levothyroxine 25 MICROGram(s) Oral daily  meropenem  IVPB 1000 milliGRAM(s) IV Intermittent every 8 hours  pantoprazole  Injectable 40 milliGRAM(s) IV Push daily  potassium acid phosphate/sodium acid phosphate tablet (K-PHOS No. 2) 1 Tablet(s) Oral once    MEDICATIONS  (PRN):  ALPRAZolam 0.25 milliGRAM(s) Oral every 6 hours PRN anxiety  artificial  tears Solution 1 Drop(s) Both EYES two times a day PRN Dry Eyes  morphine  - Injectable 4 milliGRAM(s) IV Push every 4 hours PRN Severe Pain (7 - 10)  naloxone Injectable 0.1 milliGRAM(s) IV Push every 3 minutes PRN For ANY of the following changes in patient status:  A. RR LESS THAN 10 breaths per minute, B. Oxygen saturation LESS THAN 90%, C. Sedation score of 6  ondansetron Injectable 4 milliGRAM(s) IV Push every 6 hours PRN Nausea and/or Vomiting  oxyCODONE    IR 5 milliGRAM(s) Oral every 4 hours PRN Mild Pain (1 - 3)  oxyCODONE    IR 10 milliGRAM(s) Oral every 4 hours PRN Moderate Pain (4 - 6)    Pertinent Labs: 03-22 Na143 mmol/L Glu 109 mg/dL<H> K+ 3.5 mmol/L Cr  0.50 mg/dL BUN 8 mg/dL 03-22 Phos 2.0 mg/dL<L> 03-20 Alb 1.9 g/dL<L> 03-20 [id number] 5.4 %     CAPILLARY BLOOD GLUCOSE      POCT Blood Glucose.: 108 mg/dL (22 Mar 2019 08:01)  POCT Blood Glucose.: 119 mg/dL (22 Mar 2019 00:29)    Skin: no pressure injuries  Edema: 1+ legs, 2+ L elbow    Estimated Needs:   [x] no change since previous assessment  [ ] recalculated:     Previous Nutrition Diagnosis:   [ ] Inadequate Energy Intake [ ]Inadequate Oral Intake [ ] Excessive Energy Intake   [ ] Underweight [ ] Increased Nutrient Needs [ ] Overweight/Obesity   [x] Altered GI Function [ ] Unintended Weight Loss [ ] Food & Nutrition Related Knowledge Deficit [ ] Malnutrition     Nutrition Diagnosis is [x] ongoing  [ ] resolved [ ] not applicable     New Nutrition Diagnosis: [x] not applicable - diet advance anticipated today/tomorrow      Interventions:   Recommend  [ ] Change Diet To: advance to clears with Ensure Clear tid when medically feasible  [ ] Nutrition Supplement  [ ] Nutrition Support  [ ] Other:     Monitoring and Evaluation:   [ ] PO intake [ x ] Tolerance to diet prescription [ x ] weights [ x ] labs[ x ] follow up per protocol  [x] other: ostomy function
Assessment: patient seen with fairly good PO intake. had cereal this AM. food preferences noted . colostomy functioning     Factors impacting intake: [x ] none [ ] nausea  [ ] vomiting [ ] diarrhea [ ] constipation  [ ]chewing problems [ ] swallowing issues  [ ] other:     Diet Presciption: Diet, Regular (03-25-19 @ 10:11)    Intake: fair PO reported     Current Weight: 3/27 114.4#      Pertinent Medications: MEDICATIONS  (STANDING):  acetaminophen   Tablet .. 500 milliGRAM(s) Oral once  atorvastatin 10 milliGRAM(s) Oral at bedtime  docusate sodium 100 milliGRAM(s) Oral three times a day  enoxaparin Injectable 40 milliGRAM(s) SubCutaneous every 24 hours  lactobacillus acidophilus 1 Tablet(s) Oral three times a day with meals  levothyroxine 25 MICROGram(s) Oral daily  pantoprazole  Injectable 40 milliGRAM(s) IV Push daily    MEDICATIONS  (PRN):  ALPRAZolam 0.25 milliGRAM(s) Oral every 6 hours PRN anxiety  artificial  tears Solution 1 Drop(s) Both EYES two times a day PRN Dry Eyes  naloxone Injectable 0.1 milliGRAM(s) IV Push every 3 minutes PRN For ANY of the following changes in patient status:  A. RR LESS THAN 10 breaths per minute, B. Oxygen saturation LESS THAN 90%, C. Sedation score of 6  ondansetron Injectable 4 milliGRAM(s) IV Push every 6 hours PRN Nausea and/or Vomiting    Pertinent Labs: 03-27 Na141 mmol/L Glu 93 mg/dL K+ 3.7 mmol/L Cr  0.73 mg/dL BUN 10 mg/dL 03-23 Phos 3.1 mg/dL 03-20 [id number] 5.4 %      Skin: bennett 20 no pressure injury    Estimated Needs:   [x ] no change since previous assessment  [ ] recalculated:     Previous Nutrition Diagnosis:   [x ] Altered GI Function     Nutrition Diagnosis is [x ] ongoing with colostomy   [ ] resolved [ ] not applicable     New Nutrition Diagnosis: [x ] not applicable       Interventions:   Recommend  [x ] Change Diet To: consider low fiber diet   [ ] Nutrition Supplement  [ ] Nutrition Support  [ x] Other: will provide food preferences     Monitoring and Evaluation:   [x ] PO intake [ x ] Tolerance to diet prescription [ x ] weights [ x ] labs[ x ] follow up per protocol  [ ] other:
called by RN as patient has not brought in her eye drop medications from home, but the eye drop medications that were ordered had a description saying "to be brought in from home". Since patient has not brought them in from home, eye drops have been D/C'd.     - day team to re-evaluate need for eye drops.  - will sign out to day-team  - will follow, RN to call if any changes

## 2019-03-28 LAB
ANION GAP SERPL CALC-SCNC: 7 MMOL/L — SIGNIFICANT CHANGE UP (ref 5–17)
BUN SERPL-MCNC: 12 MG/DL — SIGNIFICANT CHANGE UP (ref 7–23)
CALCIUM SERPL-MCNC: 8.3 MG/DL — LOW (ref 8.5–10.1)
CHLORIDE SERPL-SCNC: 104 MMOL/L — SIGNIFICANT CHANGE UP (ref 96–108)
CO2 SERPL-SCNC: 30 MMOL/L — SIGNIFICANT CHANGE UP (ref 22–31)
CREAT SERPL-MCNC: 0.58 MG/DL — SIGNIFICANT CHANGE UP (ref 0.5–1.3)
GLUCOSE SERPL-MCNC: 98 MG/DL — SIGNIFICANT CHANGE UP (ref 70–99)
HCT VFR BLD CALC: 29.8 % — LOW (ref 34.5–45)
HGB BLD-MCNC: 9.9 G/DL — LOW (ref 11.5–15.5)
MCHC RBC-ENTMCNC: 31.3 PG — SIGNIFICANT CHANGE UP (ref 27–34)
MCHC RBC-ENTMCNC: 33.2 GM/DL — SIGNIFICANT CHANGE UP (ref 32–36)
MCV RBC AUTO: 94.3 FL — SIGNIFICANT CHANGE UP (ref 80–100)
NRBC # BLD: 0 /100 WBCS — SIGNIFICANT CHANGE UP (ref 0–0)
PLATELET # BLD AUTO: 306 K/UL — SIGNIFICANT CHANGE UP (ref 150–400)
POTASSIUM SERPL-MCNC: 3.7 MMOL/L — SIGNIFICANT CHANGE UP (ref 3.5–5.3)
POTASSIUM SERPL-SCNC: 3.7 MMOL/L — SIGNIFICANT CHANGE UP (ref 3.5–5.3)
RBC # BLD: 3.16 M/UL — LOW (ref 3.8–5.2)
RBC # FLD: 12.5 % — SIGNIFICANT CHANGE UP (ref 10.3–14.5)
SODIUM SERPL-SCNC: 141 MMOL/L — SIGNIFICANT CHANGE UP (ref 135–145)
WBC # BLD: 7.89 K/UL — SIGNIFICANT CHANGE UP (ref 3.8–10.5)
WBC # FLD AUTO: 7.89 K/UL — SIGNIFICANT CHANGE UP (ref 3.8–10.5)

## 2019-03-28 RX ORDER — ENOXAPARIN SODIUM 100 MG/ML
40 INJECTION SUBCUTANEOUS
Qty: 0 | Refills: 0 | DISCHARGE
Start: 2019-03-28

## 2019-03-28 RX ORDER — DOCUSATE SODIUM 100 MG
1 CAPSULE ORAL
Qty: 0 | Refills: 0 | DISCHARGE
Start: 2019-03-28

## 2019-03-28 RX ORDER — ATORVASTATIN CALCIUM 80 MG/1
1 TABLET, FILM COATED ORAL
Qty: 0 | Refills: 0 | DISCHARGE
Start: 2019-03-28

## 2019-03-28 RX ORDER — ONDANSETRON 8 MG/1
4 TABLET, FILM COATED ORAL
Qty: 0 | Refills: 0 | DISCHARGE
Start: 2019-03-28

## 2019-03-28 RX ORDER — ASPIRIN/CALCIUM CARB/MAGNESIUM 324 MG
1 TABLET ORAL
Qty: 0 | Refills: 0 | COMMUNITY

## 2019-03-28 RX ORDER — BRIMONIDINE TARTRATE 2 MG/MG
1 SOLUTION/ DROPS OPHTHALMIC
Qty: 0 | Refills: 0 | COMMUNITY

## 2019-03-28 RX ORDER — BIMATOPROST 0.3 MG/ML
1 SOLUTION/ DROPS OPHTHALMIC
Qty: 0 | Refills: 0 | COMMUNITY

## 2019-03-28 RX ORDER — OXYCODONE HYDROCHLORIDE 5 MG/1
1 TABLET ORAL
Qty: 0 | Refills: 0 | DISCHARGE
Start: 2019-03-28

## 2019-03-28 RX ORDER — PANTOPRAZOLE SODIUM 20 MG/1
1 TABLET, DELAYED RELEASE ORAL
Qty: 0 | Refills: 0 | DISCHARGE
Start: 2019-03-28

## 2019-03-28 RX ADMIN — OXYCODONE HYDROCHLORIDE 5 MILLIGRAM(S): 5 TABLET ORAL at 10:27

## 2019-03-28 RX ADMIN — ATORVASTATIN CALCIUM 10 MILLIGRAM(S): 80 TABLET, FILM COATED ORAL at 21:57

## 2019-03-28 RX ADMIN — Medication 1 TABLET(S): at 18:19

## 2019-03-28 RX ADMIN — Medication 100 MILLIGRAM(S): at 21:57

## 2019-03-28 RX ADMIN — PANTOPRAZOLE SODIUM 40 MILLIGRAM(S): 20 TABLET, DELAYED RELEASE ORAL at 16:31

## 2019-03-28 RX ADMIN — Medication 1 TABLET(S): at 16:30

## 2019-03-28 RX ADMIN — Medication 25 MICROGRAM(S): at 05:36

## 2019-03-28 RX ADMIN — OXYCODONE HYDROCHLORIDE 5 MILLIGRAM(S): 5 TABLET ORAL at 20:42

## 2019-03-28 RX ADMIN — Medication 1 TABLET(S): at 08:14

## 2019-03-28 RX ADMIN — OXYCODONE HYDROCHLORIDE 5 MILLIGRAM(S): 5 TABLET ORAL at 11:27

## 2019-03-28 RX ADMIN — Medication 100 MILLIGRAM(S): at 16:31

## 2019-03-28 RX ADMIN — ENOXAPARIN SODIUM 40 MILLIGRAM(S): 100 INJECTION SUBCUTANEOUS at 16:30

## 2019-03-28 RX ADMIN — OXYCODONE HYDROCHLORIDE 5 MILLIGRAM(S): 5 TABLET ORAL at 19:42

## 2019-03-28 NOTE — PROGRESS NOTE ADULT - SUBJECTIVE AND OBJECTIVE BOX
pt seen  feeling better  ambulating better  tolerating diet  ICU Vital Signs Last 24 Hrs  T(C): 36.8 (28 Mar 2019 07:41), Max: 36.9 (27 Mar 2019 15:31)  T(F): 98.2 (28 Mar 2019 07:41), Max: 98.5 (27 Mar 2019 15:31)  HR: 76 (28 Mar 2019 07:41) (68 - 76)  BP: 96/58 (28 Mar 2019 07:41) (96/58 - 145/74)  BP(mean): --  ABP: --  ABP(mean): --  RR: 16 (28 Mar 2019 07:41) (16 - 16)  SpO2: 97% (28 Mar 2019 07:41) (96% - 97%)  gen-NAD  resp-clear  abd-soft NT/ND, incision c/d/i  ostomy functioning                          9.9    7.89  )-----------( 306      ( 28 Mar 2019 07:15 )             29.8   03-28    141  |  104  |  12  ----------------------------<  98  3.7   |  30  |  0.58    Ca    8.3<L>      28 Mar 2019 07:15

## 2019-03-28 NOTE — PROGRESS NOTE ADULT - SUBJECTIVE AND OBJECTIVE BOX
Patient is a 79y old  Female who presents with a chief complaint of diverticulitis (26 Mar 2019 09:08)      INTERVAL HPI/OVERNIGHT EVENTS: Patient seen and examined at bedside. Denies N/V. Tolerating regular diet. Stool in colostomy bag.  Denies any new complaints.  Admits to occasional abdominal pain.        T(C): 36.8 (03-28-19 @ 07:41), Max: 36.8 (03-28-19 @ 07:41)  HR: 76 (03-28-19 @ 07:41) (76 - 76)  BP: 96/58 (03-28-19 @ 07:41) (96/58 - 145/74)  RR: 16 (03-28-19 @ 07:41) (16 - 16)  SpO2: 97% (03-28-19 @ 07:41) (96% - 97%)  Wt(kg): --  I&O's Summary    27 Mar 2019 07:01  -  28 Mar 2019 07:00  --------------------------------------------------------  IN: 0 mL / OUT: 25 mL / NET: -25 mL        LABS:                        9.9    7.89  )-----------( 306      ( 28 Mar 2019 07:15 )             29.8     03-28    141  |  104  |  12  ----------------------------<  98  3.7   |  30  |  0.58    Ca    8.3<L>      28 Mar 2019 07:15          CAPILLARY BLOOD GLUCOSE        MEDICATIONS  (STANDING):  atorvastatin 10 milliGRAM(s) Oral at bedtime  docusate sodium 100 milliGRAM(s) Oral three times a day  enoxaparin Injectable 40 milliGRAM(s) SubCutaneous every 24 hours  lactobacillus acidophilus 1 Tablet(s) Oral three times a day with meals  levothyroxine 25 MICROGram(s) Oral daily  pantoprazole    Tablet 40 milliGRAM(s) Oral daily    MEDICATIONS  (PRN):  artificial  tears Solution 1 Drop(s) Both EYES two times a day PRN Dry Eyes  naloxone Injectable 0.1 milliGRAM(s) IV Push every 3 minutes PRN For ANY of the following changes in patient status:  A. RR LESS THAN 10 breaths per minute, B. Oxygen saturation LESS THAN 90%, C. Sedation score of 6  ondansetron Injectable 4 milliGRAM(s) IV Push every 6 hours PRN Nausea and/or Vomiting  oxyCODONE    IR 5 milliGRAM(s) Oral every 4 hours PRN Mild Pain (1 - 3)  oxyCODONE    IR 10 milliGRAM(s) Oral every 4 hours PRN Moderate Pain (4 - 6)        REVIEW OF SYSTEMS:  CONSTITUTIONAL: No fever, weight loss, or fatigue  EYES: No eye pain, visual disturbances, or discharge  RESPIRATORY: No cough, wheezing, chills or hemoptysis; No shortness of breath  CARDIOVASCULAR: No chest pain, palpitations, dizziness, or leg swelling  GASTROINTESTINAL: minimal occasional  abdominal pain, no epigastric pain. No nausea, vomiting, or hematemesis  GENITOURINARY: No dysuria, frequency, hematuria, or incontinence  NEUROLOGICAL: No headaches, memory loss, loss of strength, numbness, or tremors    RADIOLOGY & ADDITIONAL TESTS: NONE     Imaging Personally Reviewed:  [ ] YES  [ ] NO    Consultant(s) Notes Reviewed:  [ ] YES  [ ] NO    PHYSICAL EXAM:  GENERAL: NAD, well-groomed, well-developed  HEAD:  Atraumatic, Normocephalic  EYES: EOMI, PERRL, conjunctiva and sclera clear  ENMT: No tonsillar erythema, exudates, or enlargement; Moist mucous membranes, Good dentition, No lesions  NERVOUS SYSTEM:  Alert & Oriented X3, Good concentration; nonfocal  CHEST/LUNG: clear to auscultation bilaterally, mildly diminished bibasilar breath sounds  HEART: Regular rate and rhythm; No murmurs, rubs, or gallops  ABDOMEN: Soft, surgical site clean, dry, stool in colostomy bag      Care Discussed with Consultants/Other Providers [ X] YES  [ ] NO

## 2019-03-28 NOTE — PROGRESS NOTE ADULT - ASSESSMENT
This is a 79 F comes with hypothyroidism, HLD, hx of diverticulitis, who presented to the ED with abdominal pain for 1 week, admitted for sigmoid diverticulitis. Patient now on Invanz (through 3/26), s/p POD#9 Florez procedure. Awaiting bed for LONG.     ACUTE SIGMOID DIVERTICULITIS WITHOUT PERFORATION  -s/p Melania. POD #9. tolerating regular diet, stool in colostomy.   -S/p 3 days of Invanz, off antibiotics   -continue with pain management   -ID (Dr. WINIFRED Urban) following, f/u recs   -surgery (Dr. Spencer) following, recs appreciated   -D/C planning for LONG, awaiting bed    HYPOTHYROID  Chronic issue. Continue synthroid.     DYSLIPIDEMIA  Chronic issue. Home medication pravastatin, atorvastatin while inpatient (therapeutic interchange).       Need for prophylactic measures:   -advance care planning discussed- pt to provide health care proxy w forms w appointed health care agent documented  -Continue home medications of restasis and systane for dry eye  -Lovenox for DVT prophylaxis

## 2019-03-29 VITALS
OXYGEN SATURATION: 96 % | RESPIRATION RATE: 16 BRPM | HEART RATE: 77 BPM | TEMPERATURE: 99 F | DIASTOLIC BLOOD PRESSURE: 55 MMHG | SYSTOLIC BLOOD PRESSURE: 102 MMHG

## 2019-03-29 LAB
ANION GAP SERPL CALC-SCNC: 7 MMOL/L — SIGNIFICANT CHANGE UP (ref 5–17)
BUN SERPL-MCNC: 11 MG/DL — SIGNIFICANT CHANGE UP (ref 7–23)
CALCIUM SERPL-MCNC: 8.5 MG/DL — SIGNIFICANT CHANGE UP (ref 8.5–10.1)
CHLORIDE SERPL-SCNC: 103 MMOL/L — SIGNIFICANT CHANGE UP (ref 96–108)
CO2 SERPL-SCNC: 31 MMOL/L — SIGNIFICANT CHANGE UP (ref 22–31)
CREAT SERPL-MCNC: 0.69 MG/DL — SIGNIFICANT CHANGE UP (ref 0.5–1.3)
GLUCOSE SERPL-MCNC: 90 MG/DL — SIGNIFICANT CHANGE UP (ref 70–99)
HCT VFR BLD CALC: 32.6 % — LOW (ref 34.5–45)
HGB BLD-MCNC: 10.7 G/DL — LOW (ref 11.5–15.5)
MCHC RBC-ENTMCNC: 31.4 PG — SIGNIFICANT CHANGE UP (ref 27–34)
MCHC RBC-ENTMCNC: 32.8 GM/DL — SIGNIFICANT CHANGE UP (ref 32–36)
MCV RBC AUTO: 95.6 FL — SIGNIFICANT CHANGE UP (ref 80–100)
NRBC # BLD: 0 /100 WBCS — SIGNIFICANT CHANGE UP (ref 0–0)
PLATELET # BLD AUTO: 330 K/UL — SIGNIFICANT CHANGE UP (ref 150–400)
POTASSIUM SERPL-MCNC: 4.3 MMOL/L — SIGNIFICANT CHANGE UP (ref 3.5–5.3)
POTASSIUM SERPL-SCNC: 4.3 MMOL/L — SIGNIFICANT CHANGE UP (ref 3.5–5.3)
RBC # BLD: 3.41 M/UL — LOW (ref 3.8–5.2)
RBC # FLD: 12.6 % — SIGNIFICANT CHANGE UP (ref 10.3–14.5)
SODIUM SERPL-SCNC: 141 MMOL/L — SIGNIFICANT CHANGE UP (ref 135–145)
WBC # BLD: 8.58 K/UL — SIGNIFICANT CHANGE UP (ref 3.8–10.5)
WBC # FLD AUTO: 8.58 K/UL — SIGNIFICANT CHANGE UP (ref 3.8–10.5)

## 2019-03-29 PROCEDURE — 97116 GAIT TRAINING THERAPY: CPT

## 2019-03-29 PROCEDURE — 85027 COMPLETE CBC AUTOMATED: CPT

## 2019-03-29 PROCEDURE — 82962 GLUCOSE BLOOD TEST: CPT

## 2019-03-29 PROCEDURE — 96376 TX/PRO/DX INJ SAME DRUG ADON: CPT

## 2019-03-29 PROCEDURE — 80053 COMPREHEN METABOLIC PANEL: CPT

## 2019-03-29 PROCEDURE — 86900 BLOOD TYPING SEROLOGIC ABO: CPT

## 2019-03-29 PROCEDURE — 96375 TX/PRO/DX INJ NEW DRUG ADDON: CPT

## 2019-03-29 PROCEDURE — 36415 COLL VENOUS BLD VENIPUNCTURE: CPT

## 2019-03-29 PROCEDURE — 85652 RBC SED RATE AUTOMATED: CPT

## 2019-03-29 PROCEDURE — 84100 ASSAY OF PHOSPHORUS: CPT

## 2019-03-29 PROCEDURE — 83036 HEMOGLOBIN GLYCOSYLATED A1C: CPT

## 2019-03-29 PROCEDURE — 83690 ASSAY OF LIPASE: CPT

## 2019-03-29 PROCEDURE — 87070 CULTURE OTHR SPECIMN AEROBIC: CPT

## 2019-03-29 PROCEDURE — 86850 RBC ANTIBODY SCREEN: CPT

## 2019-03-29 PROCEDURE — 99285 EMERGENCY DEPT VISIT HI MDM: CPT | Mod: 25

## 2019-03-29 PROCEDURE — 97530 THERAPEUTIC ACTIVITIES: CPT

## 2019-03-29 PROCEDURE — 87040 BLOOD CULTURE FOR BACTERIA: CPT

## 2019-03-29 PROCEDURE — 86901 BLOOD TYPING SEROLOGIC RH(D): CPT

## 2019-03-29 PROCEDURE — 83735 ASSAY OF MAGNESIUM: CPT

## 2019-03-29 PROCEDURE — 80048 BASIC METABOLIC PNL TOTAL CA: CPT

## 2019-03-29 PROCEDURE — 74177 CT ABD & PELVIS W/CONTRAST: CPT

## 2019-03-29 PROCEDURE — 97162 PT EVAL MOD COMPLEX 30 MIN: CPT

## 2019-03-29 PROCEDURE — 86140 C-REACTIVE PROTEIN: CPT

## 2019-03-29 PROCEDURE — 96374 THER/PROPH/DIAG INJ IV PUSH: CPT

## 2019-03-29 PROCEDURE — 88307 TISSUE EXAM BY PATHOLOGIST: CPT

## 2019-03-29 RX ADMIN — OXYCODONE HYDROCHLORIDE 5 MILLIGRAM(S): 5 TABLET ORAL at 14:01

## 2019-03-29 RX ADMIN — Medication 1 TABLET(S): at 07:56

## 2019-03-29 RX ADMIN — ENOXAPARIN SODIUM 40 MILLIGRAM(S): 100 INJECTION SUBCUTANEOUS at 11:33

## 2019-03-29 RX ADMIN — Medication 100 MILLIGRAM(S): at 05:59

## 2019-03-29 RX ADMIN — OXYCODONE HYDROCHLORIDE 5 MILLIGRAM(S): 5 TABLET ORAL at 08:40

## 2019-03-29 RX ADMIN — Medication 25 MICROGRAM(S): at 05:59

## 2019-03-29 RX ADMIN — Medication 100 MILLIGRAM(S): at 13:58

## 2019-03-29 RX ADMIN — Medication 1 TABLET(S): at 11:32

## 2019-03-29 RX ADMIN — OXYCODONE HYDROCHLORIDE 5 MILLIGRAM(S): 5 TABLET ORAL at 01:44

## 2019-03-29 RX ADMIN — OXYCODONE HYDROCHLORIDE 5 MILLIGRAM(S): 5 TABLET ORAL at 00:44

## 2019-03-29 RX ADMIN — PANTOPRAZOLE SODIUM 40 MILLIGRAM(S): 20 TABLET, DELAYED RELEASE ORAL at 11:32

## 2019-03-29 NOTE — PROGRESS NOTE ADULT - SUBJECTIVE AND OBJECTIVE BOX
Patient is a 79y old  Female who presents with a chief complaint of diverticulitis (28 Mar 2019 17:47)      INTERVAL HPI/OVERNIGHT EVENTS: Patient seen and examined at bedside. Denies N/V. Tolerating regular diet. Stool in colostomy bag.  Denies any new complaints.  Admits to occasional abdominal pain.        T(C): 37.1 (03-29-19 @ 08:10), Max: 37.1 (03-29-19 @ 08:10)  HR: 77 (03-29-19 @ 08:10) (75 - 77)  BP: 102/55 (03-29-19 @ 08:10) (102/55 - 108/68)  RR: 16 (03-29-19 @ 08:10) (16 - 16)  SpO2: 96% (03-29-19 @ 08:10) (96% - 96%)  Wt(kg): --  I&O's Summary    28 Mar 2019 07:01  -  29 Mar 2019 07:00  --------------------------------------------------------  IN: 0 mL / OUT: 50 mL / NET: -50 mL        LABS:                        10.7   8.58  )-----------( 330      ( 29 Mar 2019 07:18 )             32.6     03-29    141  |  103  |  11  ----------------------------<  90  4.3   |  31  |  0.69    Ca    8.5      29 Mar 2019 07:18          CAPILLARY BLOOD GLUCOSE      MEDICATIONS  (STANDING):  atorvastatin 10 milliGRAM(s) Oral at bedtime  docusate sodium 100 milliGRAM(s) Oral three times a day  enoxaparin Injectable 40 milliGRAM(s) SubCutaneous every 24 hours  lactobacillus acidophilus 1 Tablet(s) Oral three times a day with meals  levothyroxine 25 MICROGram(s) Oral daily  pantoprazole    Tablet 40 milliGRAM(s) Oral daily    MEDICATIONS  (PRN):  artificial  tears Solution 1 Drop(s) Both EYES two times a day PRN Dry Eyes  naloxone Injectable 0.1 milliGRAM(s) IV Push every 3 minutes PRN For ANY of the following changes in patient status:  A. RR LESS THAN 10 breaths per minute, B. Oxygen saturation LESS THAN 90%, C. Sedation score of 6  ondansetron Injectable 4 milliGRAM(s) IV Push every 6 hours PRN Nausea and/or Vomiting  oxyCODONE    IR 5 milliGRAM(s) Oral every 4 hours PRN Mild Pain (1 - 3)  oxyCODONE    IR 10 milliGRAM(s) Oral every 4 hours PRN Moderate Pain (4 - 6)      REVIEW OF SYSTEMS:  CONSTITUTIONAL: No fever, weight loss, or fatigue  EYES: No eye pain, visual disturbances, or discharge  RESPIRATORY: No cough, wheezing, chills or hemoptysis; No shortness of breath  CARDIOVASCULAR: No chest pain, palpitations, dizziness, or leg swelling  GASTROINTESTINAL: minimal occasional  abdominal pain, no epigastric pain. No nausea, vomiting, or hematemesis  GENITOURINARY: No dysuria, frequency, hematuria, or incontinence  NEUROLOGICAL: No headaches, memory loss, loss of strength, numbness, or tremors    RADIOLOGY & ADDITIONAL TESTS: NONE     Imaging Personally Reviewed:  [ ] YES  [ ] NO    Consultant(s) Notes Reviewed:  [ ] YES  [ ] NO    PHYSICAL EXAM:  GENERAL: NAD, well-groomed, well-developed  HEAD:  Atraumatic, Normocephalic  EYES: EOMI, PERRL, conjunctiva and sclera clear  ENMT: No tonsillar erythema, exudates, or enlargement; Moist mucous membranes, Good dentition, No lesions  NERVOUS SYSTEM:  Alert & Oriented X3, Good concentration; nonfocal  CHEST/LUNG: clear to auscultation bilaterally, mildly diminished bibasilar breath sounds  HEART: Regular rate and rhythm; No murmurs, rubs, or gallops  ABDOMEN: Soft, surgical site clean, dry, stool in colostomy bag    Care Discussed with Consultants/Other Providers [ ] YES  [ ] NO

## 2019-03-29 NOTE — PROGRESS NOTE ADULT - ASSESSMENT
This is a 79 F comes with hypothyroidism, HLD, hx of diverticulitis, who presented to the ED with abdominal pain for 1 week, admitted for sigmoid diverticulitis. Patient now on Invanz (through 3/26), s/p POD#10 Florez procedure. Awaiting bed for LONG.     ACUTE SIGMOID DIVERTICULITIS WITHOUT PERFORATION  -s/p Melania. POD #10. tolerating regular diet, stool in colostomy.   -S/p 3 days of Invanz, off antibiotics   -continue with pain management   -ID (Dr. WINIFRED Urban) following, f/u recs   -surgery (Dr. Spencer) following, recs appreciated   -D/C planning for LONG today     HYPOTHYROID  Chronic issue. Continue synthroid.     DYSLIPIDEMIA  Chronic issue. Home medication pravastatin, atorvastatin while inpatient (therapeutic interchange).       Need for prophylactic measures:   -advance care planning discussed- pt to provide health care proxy w forms w appointed health care agent documented  -Continue home medications of restasis and systane for dry eye  -Lovenox for DVT prophylaxis

## 2019-03-29 NOTE — PROGRESS NOTE ADULT - PROVIDER SPECIALTY LIST ADULT
Anesthesia
Anesthesia
Infectious Disease
Internal Medicine
Surgery
Infectious Disease
Internal Medicine
Surgery

## 2019-03-29 NOTE — DISCHARGE NOTE NURSING/CASE MANAGEMENT/SOCIAL WORK - NSDCDPATPORTLINK_GEN_ALL_CORE
You can access the [facility], offered by [facility], by registering with the following website: [email or web address]

## 2019-06-24 ENCOUNTER — OUTPATIENT (OUTPATIENT)
Dept: OUTPATIENT SERVICES | Facility: HOSPITAL | Age: 79
LOS: 1 days | End: 2019-06-24
Payer: COMMERCIAL

## 2019-06-24 VITALS
DIASTOLIC BLOOD PRESSURE: 67 MMHG | RESPIRATION RATE: 16 BRPM | OXYGEN SATURATION: 98 % | HEIGHT: 63 IN | SYSTOLIC BLOOD PRESSURE: 126 MMHG | WEIGHT: 108.03 LBS | HEART RATE: 65 BPM | TEMPERATURE: 98 F

## 2019-06-24 DIAGNOSIS — Z98.49 CATARACT EXTRACTION STATUS, UNSPECIFIED EYE: Chronic | ICD-10-CM

## 2019-06-24 DIAGNOSIS — Z98.890 OTHER SPECIFIED POSTPROCEDURAL STATES: Chronic | ICD-10-CM

## 2019-06-24 DIAGNOSIS — Z93.3 COLOSTOMY STATUS: ICD-10-CM

## 2019-06-24 DIAGNOSIS — Z85.828 PERSONAL HISTORY OF OTHER MALIGNANT NEOPLASM OF SKIN: Chronic | ICD-10-CM

## 2019-06-24 DIAGNOSIS — Z01.818 ENCOUNTER FOR OTHER PREPROCEDURAL EXAMINATION: ICD-10-CM

## 2019-06-24 DIAGNOSIS — Z90.49 ACQUIRED ABSENCE OF OTHER SPECIFIED PARTS OF DIGESTIVE TRACT: Chronic | ICD-10-CM

## 2019-06-24 LAB
ALBUMIN SERPL ELPH-MCNC: 3.6 G/DL — SIGNIFICANT CHANGE UP (ref 3.3–5)
ALP SERPL-CCNC: 61 U/L — SIGNIFICANT CHANGE UP (ref 40–120)
ALT FLD-CCNC: 23 U/L — SIGNIFICANT CHANGE UP (ref 12–78)
ANION GAP SERPL CALC-SCNC: 4 MMOL/L — LOW (ref 5–17)
AST SERPL-CCNC: 17 U/L — SIGNIFICANT CHANGE UP (ref 15–37)
BILIRUB SERPL-MCNC: 0.4 MG/DL — SIGNIFICANT CHANGE UP (ref 0.2–1.2)
BUN SERPL-MCNC: 17 MG/DL — SIGNIFICANT CHANGE UP (ref 7–23)
CALCIUM SERPL-MCNC: 8.7 MG/DL — SIGNIFICANT CHANGE UP (ref 8.5–10.1)
CHLORIDE SERPL-SCNC: 105 MMOL/L — SIGNIFICANT CHANGE UP (ref 96–108)
CO2 SERPL-SCNC: 32 MMOL/L — HIGH (ref 22–31)
CREAT SERPL-MCNC: 0.83 MG/DL — SIGNIFICANT CHANGE UP (ref 0.5–1.3)
GLUCOSE SERPL-MCNC: 91 MG/DL — SIGNIFICANT CHANGE UP (ref 70–99)
HCT VFR BLD CALC: 36.4 % — SIGNIFICANT CHANGE UP (ref 34.5–45)
HGB BLD-MCNC: 11.7 G/DL — SIGNIFICANT CHANGE UP (ref 11.5–15.5)
MCHC RBC-ENTMCNC: 31.1 PG — SIGNIFICANT CHANGE UP (ref 27–34)
MCHC RBC-ENTMCNC: 32.1 GM/DL — SIGNIFICANT CHANGE UP (ref 32–36)
MCV RBC AUTO: 96.8 FL — SIGNIFICANT CHANGE UP (ref 80–100)
NRBC # BLD: 0 /100 WBCS — SIGNIFICANT CHANGE UP (ref 0–0)
PLATELET # BLD AUTO: 223 K/UL — SIGNIFICANT CHANGE UP (ref 150–400)
POTASSIUM SERPL-MCNC: 4.3 MMOL/L — SIGNIFICANT CHANGE UP (ref 3.5–5.3)
POTASSIUM SERPL-SCNC: 4.3 MMOL/L — SIGNIFICANT CHANGE UP (ref 3.5–5.3)
PROT SERPL-MCNC: 7.3 G/DL — SIGNIFICANT CHANGE UP (ref 6–8.3)
RBC # BLD: 3.76 M/UL — LOW (ref 3.8–5.2)
RBC # FLD: 14.2 % — SIGNIFICANT CHANGE UP (ref 10.3–14.5)
SODIUM SERPL-SCNC: 141 MMOL/L — SIGNIFICANT CHANGE UP (ref 135–145)
WBC # BLD: 5.13 K/UL — SIGNIFICANT CHANGE UP (ref 3.8–10.5)
WBC # FLD AUTO: 5.13 K/UL — SIGNIFICANT CHANGE UP (ref 3.8–10.5)

## 2019-06-24 PROCEDURE — 85027 COMPLETE CBC AUTOMATED: CPT

## 2019-06-24 PROCEDURE — G0463: CPT

## 2019-06-24 PROCEDURE — 86901 BLOOD TYPING SEROLOGIC RH(D): CPT

## 2019-06-24 PROCEDURE — 80053 COMPREHEN METABOLIC PANEL: CPT

## 2019-06-24 PROCEDURE — 86900 BLOOD TYPING SEROLOGIC ABO: CPT

## 2019-06-24 PROCEDURE — 86850 RBC ANTIBODY SCREEN: CPT

## 2019-06-24 PROCEDURE — 36415 COLL VENOUS BLD VENIPUNCTURE: CPT

## 2019-06-24 RX ORDER — LEVOTHYROXINE SODIUM 125 MCG
1 TABLET ORAL
Qty: 0 | Refills: 0 | DISCHARGE

## 2019-06-24 RX ORDER — LORATADINE 10 MG/1
0 TABLET ORAL
Qty: 0 | Refills: 0 | DISCHARGE

## 2019-06-24 NOTE — H&P PST ADULT - HISTORY OF PRESENT ILLNESS
79 female with PMH of hypothyroidism HLD diverticulitis,  with flare ups in March 2019, had colon resection and colostomy in late March 2019, now scheduled for closure of colostomy. Pre op testing today. 79 female with PMH of hypothyroidism HLD and diverticulitis,  with flare ups in March 2019, had colon resection and colostomy in late March 2019, now scheduled for closure of colostomy. Denies abdominal pain , nausea or vomiting. Colostomy working well. Pre op testing today.

## 2019-06-24 NOTE — H&P PST ADULT - NSICDXPASTSURGICALHX_GEN_ALL_CORE_FT
PAST SURGICAL HISTORY:  H/O arthroscopic knee surgery     H/O breast biopsy bilateral benign 89 and 1993    History of skin cancer melanoma, left arm, left side of lip. Mohs forehead    S/P cataract surgery 2015    S/P colon resection with colostomy March 2019

## 2019-06-24 NOTE — H&P PST ADULT - NSANTHOSAYNRD_GEN_A_CORE
No. PHILL screening performed.  STOP BANG Legend: 0-2 = LOW Risk; 3-4 = INTERMEDIATE Risk; 5-8 = HIGH Risk

## 2019-06-24 NOTE — H&P PST ADULT - ASSESSMENT
79 female with PMH of hypothyroidism HLD and diverticulitis,  with flare ups in March 2019, had colon resection and colostomy in late March 2019, now scheduled for closure of colostomy. Denies abdominal pain , nausea or vomiting. Colostomy working well. Pre op testing today.  Medical eval advised.

## 2019-06-24 NOTE — H&P PST ADULT - NEGATIVE GASTROINTESTINAL SYMPTOMS
no diarrhea/no melena/no hematochezia/no change in bowel habits/no constipation no constipation/no hematochezia/no melena/no diarrhea

## 2019-06-24 NOTE — H&P PST ADULT - RS GEN PE MLT RESP DETAILS PC
respirations non-labored/breath sounds equal/clear to auscultation bilaterally/airway patent/normal/good air movement

## 2019-06-24 NOTE — H&P PST ADULT - VISION (WITH CORRECTIVE LENSES IF THE PATIENT USUALLY WEARS THEM):
Normal vision: sees adequately in most situations; can see medication labels, newsprint/bilateral cataract

## 2019-07-01 ENCOUNTER — INPATIENT (INPATIENT)
Facility: HOSPITAL | Age: 79
LOS: 5 days | Discharge: HOME | DRG: 337 | End: 2019-07-07
Attending: SURGERY | Admitting: SURGERY
Payer: COMMERCIAL

## 2019-07-01 VITALS
TEMPERATURE: 98 F | RESPIRATION RATE: 16 BRPM | HEART RATE: 62 BPM | OXYGEN SATURATION: 99 % | WEIGHT: 108.03 LBS | DIASTOLIC BLOOD PRESSURE: 68 MMHG | HEIGHT: 63 IN | SYSTOLIC BLOOD PRESSURE: 124 MMHG

## 2019-07-01 DIAGNOSIS — Z98.890 OTHER SPECIFIED POSTPROCEDURAL STATES: Chronic | ICD-10-CM

## 2019-07-01 DIAGNOSIS — Z01.818 ENCOUNTER FOR OTHER PREPROCEDURAL EXAMINATION: ICD-10-CM

## 2019-07-01 DIAGNOSIS — Z98.49 CATARACT EXTRACTION STATUS, UNSPECIFIED EYE: Chronic | ICD-10-CM

## 2019-07-01 DIAGNOSIS — Z93.3 COLOSTOMY STATUS: ICD-10-CM

## 2019-07-01 DIAGNOSIS — Z85.828 PERSONAL HISTORY OF OTHER MALIGNANT NEOPLASM OF SKIN: Chronic | ICD-10-CM

## 2019-07-01 DIAGNOSIS — Z90.49 ACQUIRED ABSENCE OF OTHER SPECIFIED PARTS OF DIGESTIVE TRACT: Chronic | ICD-10-CM

## 2019-07-01 PROCEDURE — 88304 TISSUE EXAM BY PATHOLOGIST: CPT | Mod: 26

## 2019-07-01 RX ORDER — ONDANSETRON 8 MG/1
4 TABLET, FILM COATED ORAL ONCE
Refills: 0 | Status: DISCONTINUED | OUTPATIENT
Start: 2019-07-01 | End: 2019-07-01

## 2019-07-01 RX ORDER — ACETAMINOPHEN 500 MG
1000 TABLET ORAL ONCE
Refills: 0 | Status: COMPLETED | OUTPATIENT
Start: 2019-07-02 | End: 2019-07-02

## 2019-07-01 RX ORDER — OMEGA-3 ACID ETHYL ESTERS 1 G
0 CAPSULE ORAL
Qty: 0 | Refills: 0 | DISCHARGE

## 2019-07-01 RX ORDER — HYDROMORPHONE HYDROCHLORIDE 2 MG/ML
0.5 INJECTION INTRAMUSCULAR; INTRAVENOUS; SUBCUTANEOUS
Refills: 0 | Status: DISCONTINUED | OUTPATIENT
Start: 2019-07-01 | End: 2019-07-01

## 2019-07-01 RX ORDER — OXYCODONE HYDROCHLORIDE 5 MG/1
5 TABLET ORAL ONCE
Refills: 0 | Status: DISCONTINUED | OUTPATIENT
Start: 2019-07-01 | End: 2019-07-01

## 2019-07-01 RX ORDER — CEFOTETAN DISODIUM 1 G
2 VIAL (EA) INJECTION EVERY 12 HOURS
Refills: 0 | Status: DISCONTINUED | OUTPATIENT
Start: 2019-07-01 | End: 2019-07-01

## 2019-07-01 RX ORDER — CEFOTETAN DISODIUM 1 G
2 VIAL (EA) INJECTION ONCE
Refills: 0 | Status: COMPLETED | OUTPATIENT
Start: 2019-07-01 | End: 2019-07-01

## 2019-07-01 RX ORDER — GLUCOSAMINE/MSM/CHONDROITIN A 750-375MG
0 TABLET ORAL
Qty: 0 | Refills: 0 | DISCHARGE

## 2019-07-01 RX ORDER — ONDANSETRON 8 MG/1
4 TABLET, FILM COATED ORAL EVERY 6 HOURS
Refills: 0 | Status: DISCONTINUED | OUTPATIENT
Start: 2019-07-01 | End: 2019-07-04

## 2019-07-01 RX ORDER — CYCLOSPORINE 0.5 MG/ML
1 EMULSION OPHTHALMIC
Qty: 0 | Refills: 0 | DISCHARGE

## 2019-07-01 RX ORDER — FEXOFENADINE HCL 30 MG
1 TABLET ORAL
Qty: 0 | Refills: 0 | DISCHARGE

## 2019-07-01 RX ORDER — SODIUM CHLORIDE 9 MG/ML
1000 INJECTION, SOLUTION INTRAVENOUS
Refills: 0 | Status: DISCONTINUED | OUTPATIENT
Start: 2019-07-01 | End: 2019-07-01

## 2019-07-01 RX ORDER — ALVIMOPAN 12 MG/1
12 CAPSULE ORAL ONCE
Refills: 0 | Status: DISCONTINUED | OUTPATIENT
Start: 2019-07-01 | End: 2019-07-01

## 2019-07-01 RX ORDER — LEVOTHYROXINE SODIUM 125 MCG
12.5 TABLET ORAL AT BEDTIME
Refills: 0 | Status: DISCONTINUED | OUTPATIENT
Start: 2019-07-01 | End: 2019-07-07

## 2019-07-01 RX ORDER — ACETAMINOPHEN 500 MG
1000 TABLET ORAL ONCE
Refills: 0 | Status: COMPLETED | OUTPATIENT
Start: 2019-07-01 | End: 2019-07-01

## 2019-07-01 RX ORDER — HYDROMORPHONE HYDROCHLORIDE 2 MG/ML
1 INJECTION INTRAMUSCULAR; INTRAVENOUS; SUBCUTANEOUS ONCE
Refills: 0 | Status: DISCONTINUED | OUTPATIENT
Start: 2019-07-01 | End: 2019-07-01

## 2019-07-01 RX ORDER — HYDROMORPHONE HYDROCHLORIDE 2 MG/ML
2 INJECTION INTRAMUSCULAR; INTRAVENOUS; SUBCUTANEOUS EVERY 4 HOURS
Refills: 0 | Status: DISCONTINUED | OUTPATIENT
Start: 2019-07-01 | End: 2019-07-03

## 2019-07-01 RX ORDER — DIPHENHYDRAMINE HCL 50 MG
12.5 CAPSULE ORAL EVERY 4 HOURS
Refills: 0 | Status: DISCONTINUED | OUTPATIENT
Start: 2019-07-01 | End: 2019-07-04

## 2019-07-01 RX ORDER — LEVOTHYROXINE SODIUM 125 MCG
1 TABLET ORAL
Qty: 0 | Refills: 0 | DISCHARGE

## 2019-07-01 RX ORDER — HYDROMORPHONE HYDROCHLORIDE 2 MG/ML
1 INJECTION INTRAMUSCULAR; INTRAVENOUS; SUBCUTANEOUS EVERY 4 HOURS
Refills: 0 | Status: DISCONTINUED | OUTPATIENT
Start: 2019-07-01 | End: 2019-07-03

## 2019-07-01 RX ORDER — SODIUM CHLORIDE 9 MG/ML
1000 INJECTION, SOLUTION INTRAVENOUS
Refills: 0 | Status: DISCONTINUED | OUTPATIENT
Start: 2019-07-01 | End: 2019-07-04

## 2019-07-01 RX ADMIN — Medication 100 GRAM(S): at 21:39

## 2019-07-01 RX ADMIN — SODIUM CHLORIDE 100 MILLILITER(S): 9 INJECTION, SOLUTION INTRAVENOUS at 16:02

## 2019-07-01 RX ADMIN — Medication 1000 MILLIGRAM(S): at 22:22

## 2019-07-01 RX ADMIN — HYDROMORPHONE HYDROCHLORIDE 0.5 MILLIGRAM(S): 2 INJECTION INTRAMUSCULAR; INTRAVENOUS; SUBCUTANEOUS at 16:53

## 2019-07-01 RX ADMIN — Medication 12.5 MICROGRAM(S): at 21:39

## 2019-07-01 RX ADMIN — HYDROMORPHONE HYDROCHLORIDE 0.5 MILLIGRAM(S): 2 INJECTION INTRAMUSCULAR; INTRAVENOUS; SUBCUTANEOUS at 16:23

## 2019-07-01 RX ADMIN — HYDROMORPHONE HYDROCHLORIDE 0.5 MILLIGRAM(S): 2 INJECTION INTRAMUSCULAR; INTRAVENOUS; SUBCUTANEOUS at 16:02

## 2019-07-01 RX ADMIN — Medication 400 MILLIGRAM(S): at 22:02

## 2019-07-01 RX ADMIN — HYDROMORPHONE HYDROCHLORIDE 0.5 MILLIGRAM(S): 2 INJECTION INTRAMUSCULAR; INTRAVENOUS; SUBCUTANEOUS at 16:12

## 2019-07-01 RX ADMIN — SODIUM CHLORIDE 75 MILLILITER(S): 9 INJECTION, SOLUTION INTRAVENOUS at 08:26

## 2019-07-01 NOTE — BRIEF OPERATIVE NOTE - NSICDXBRIEFPROCEDURE_GEN_ALL_CORE_FT
PROCEDURES:  Repair of multiple perforations of small intestine 01-Jul-2019 15:23:05  Armando Spencer  Reversal of colostomy after partial colectomy 01-Jul-2019 15:21:17  Armando Spencer PROCEDURES:  Mobilization, splenic flexure 01-Jul-2019 15:33:23  Armando Spencer  Repair of multiple perforations of small intestine 01-Jul-2019 15:23:05  Armando Spencer  Reversal of colostomy after partial colectomy 01-Jul-2019 15:21:17  Armando Spencer

## 2019-07-02 LAB
ANION GAP SERPL CALC-SCNC: 6 MMOL/L — SIGNIFICANT CHANGE UP (ref 5–17)
BASOPHILS # BLD AUTO: 0 K/UL — SIGNIFICANT CHANGE UP (ref 0–0.2)
BASOPHILS NFR BLD AUTO: 0 % — SIGNIFICANT CHANGE UP (ref 0–2)
BUN SERPL-MCNC: 14 MG/DL — SIGNIFICANT CHANGE UP (ref 7–23)
CALCIUM SERPL-MCNC: 8.1 MG/DL — LOW (ref 8.5–10.1)
CHLORIDE SERPL-SCNC: 108 MMOL/L — SIGNIFICANT CHANGE UP (ref 96–108)
CO2 SERPL-SCNC: 27 MMOL/L — SIGNIFICANT CHANGE UP (ref 22–31)
CREAT SERPL-MCNC: 1 MG/DL — SIGNIFICANT CHANGE UP (ref 0.5–1.3)
EOSINOPHIL # BLD AUTO: 0 K/UL — SIGNIFICANT CHANGE UP (ref 0–0.5)
EOSINOPHIL NFR BLD AUTO: 0 % — SIGNIFICANT CHANGE UP (ref 0–6)
GLUCOSE SERPL-MCNC: 97 MG/DL — SIGNIFICANT CHANGE UP (ref 70–99)
HCT VFR BLD CALC: 32 % — LOW (ref 34.5–45)
HGB BLD-MCNC: 10.5 G/DL — LOW (ref 11.5–15.5)
LYMPHOCYTES # BLD AUTO: 0.48 K/UL — LOW (ref 1–3.3)
LYMPHOCYTES # BLD AUTO: 5 % — LOW (ref 13–44)
MAGNESIUM SERPL-MCNC: 1.9 MG/DL — SIGNIFICANT CHANGE UP (ref 1.6–2.6)
MCHC RBC-ENTMCNC: 31.2 PG — SIGNIFICANT CHANGE UP (ref 27–34)
MCHC RBC-ENTMCNC: 32.8 GM/DL — SIGNIFICANT CHANGE UP (ref 32–36)
MCV RBC AUTO: 95 FL — SIGNIFICANT CHANGE UP (ref 80–100)
MONOCYTES # BLD AUTO: 0.38 K/UL — SIGNIFICANT CHANGE UP (ref 0–0.9)
MONOCYTES NFR BLD AUTO: 4 % — SIGNIFICANT CHANGE UP (ref 2–14)
NEUTROPHILS # BLD AUTO: 8.73 K/UL — HIGH (ref 1.8–7.4)
NEUTROPHILS NFR BLD AUTO: 80 % — HIGH (ref 43–77)
NRBC # BLD: SIGNIFICANT CHANGE UP /100 WBCS (ref 0–0)
PHOSPHATE SERPL-MCNC: 4.4 MG/DL — SIGNIFICANT CHANGE UP (ref 2.5–4.5)
PLATELET # BLD AUTO: 204 K/UL — SIGNIFICANT CHANGE UP (ref 150–400)
POTASSIUM SERPL-MCNC: 4.5 MMOL/L — SIGNIFICANT CHANGE UP (ref 3.5–5.3)
POTASSIUM SERPL-SCNC: 4.5 MMOL/L — SIGNIFICANT CHANGE UP (ref 3.5–5.3)
RBC # BLD: 3.37 M/UL — LOW (ref 3.8–5.2)
RBC # FLD: 13.8 % — SIGNIFICANT CHANGE UP (ref 10.3–14.5)
SODIUM SERPL-SCNC: 141 MMOL/L — SIGNIFICANT CHANGE UP (ref 135–145)
WBC # BLD: 9.59 K/UL — SIGNIFICANT CHANGE UP (ref 3.8–10.5)
WBC # FLD AUTO: 9.59 K/UL — SIGNIFICANT CHANGE UP (ref 3.8–10.5)

## 2019-07-02 PROCEDURE — 99231 SBSQ HOSP IP/OBS SF/LOW 25: CPT

## 2019-07-02 RX ORDER — ENOXAPARIN SODIUM 100 MG/ML
40 INJECTION SUBCUTANEOUS DAILY
Refills: 0 | Status: DISCONTINUED | OUTPATIENT
Start: 2019-07-02 | End: 2019-07-07

## 2019-07-02 RX ORDER — ALPRAZOLAM 0.25 MG
0.5 TABLET ORAL EVERY 8 HOURS
Refills: 0 | Status: DISCONTINUED | OUTPATIENT
Start: 2019-07-02 | End: 2019-07-07

## 2019-07-02 RX ORDER — ALPRAZOLAM 0.25 MG
0.5 TABLET ORAL EVERY 6 HOURS
Refills: 0 | Status: DISCONTINUED | OUTPATIENT
Start: 2019-07-02 | End: 2019-07-02

## 2019-07-02 RX ADMIN — HYDROMORPHONE HYDROCHLORIDE 1 MILLIGRAM(S): 2 INJECTION INTRAMUSCULAR; INTRAVENOUS; SUBCUTANEOUS at 10:48

## 2019-07-02 RX ADMIN — Medication 0.5 MILLIGRAM(S): at 19:17

## 2019-07-02 RX ADMIN — HYDROMORPHONE HYDROCHLORIDE 1 MILLIGRAM(S): 2 INJECTION INTRAMUSCULAR; INTRAVENOUS; SUBCUTANEOUS at 04:23

## 2019-07-02 RX ADMIN — HYDROMORPHONE HYDROCHLORIDE 1 MILLIGRAM(S): 2 INJECTION INTRAMUSCULAR; INTRAVENOUS; SUBCUTANEOUS at 15:17

## 2019-07-02 RX ADMIN — Medication 12.5 MICROGRAM(S): at 22:35

## 2019-07-02 RX ADMIN — HYDROMORPHONE HYDROCHLORIDE 1 MILLIGRAM(S): 2 INJECTION INTRAMUSCULAR; INTRAVENOUS; SUBCUTANEOUS at 15:02

## 2019-07-02 RX ADMIN — HYDROMORPHONE HYDROCHLORIDE 1 MILLIGRAM(S): 2 INJECTION INTRAMUSCULAR; INTRAVENOUS; SUBCUTANEOUS at 03:56

## 2019-07-02 RX ADMIN — HYDROMORPHONE HYDROCHLORIDE 1 MILLIGRAM(S): 2 INJECTION INTRAMUSCULAR; INTRAVENOUS; SUBCUTANEOUS at 11:02

## 2019-07-02 RX ADMIN — Medication 1000 MILLIGRAM(S): at 12:02

## 2019-07-02 RX ADMIN — Medication 1000 MILLIGRAM(S): at 06:02

## 2019-07-02 RX ADMIN — Medication 400 MILLIGRAM(S): at 05:51

## 2019-07-02 RX ADMIN — SODIUM CHLORIDE 70 MILLILITER(S): 9 INJECTION, SOLUTION INTRAVENOUS at 08:51

## 2019-07-02 RX ADMIN — Medication 400 MILLIGRAM(S): at 11:47

## 2019-07-02 RX ADMIN — SODIUM CHLORIDE 100 MILLILITER(S): 9 INJECTION, SOLUTION INTRAVENOUS at 11:48

## 2019-07-02 RX ADMIN — ENOXAPARIN SODIUM 40 MILLIGRAM(S): 100 INJECTION SUBCUTANEOUS at 14:17

## 2019-07-02 NOTE — PROVIDER CONTACT NOTE (CHANGE IN STATUS NOTIFICATION) - ASSESSMENT
Aware of the present date, time; and situation but she was hearing voices and feels betrayed, She also feels that her  was not being allowed to come in the hospital.

## 2019-07-02 NOTE — PROVIDER CONTACT NOTE (CHANGE IN STATUS NOTIFICATION) - SITUATION
Patient started getting paranoid; hearing voices and she keeps stating that she feels betrayed. Patient even called the [place] police department.

## 2019-07-02 NOTE — CHART NOTE - NSCHARTNOTEFT_GEN_A_CORE
Called by floor RN for patient agitation.  Patient seen and examined at bedside immediately, states she is very stressed out, does not trust the hospital staff, and is attempting to call the police.  As per nursing, patient received dilaudid a few hours ago.  Upon 's arrival, patient seemed to calm down.  On last admission, patient with similar episode, responded well to anxiolytic.  VSS.    Anxiety, possibly hospital delirium and/or side effects of pain medication  - Reassurance  - Xanax 0.5mg TID PRN  - No med/psych evaluation for now  - Will continue to monitor  - Discussed with Dr. Spencer Called by floor RN for patient agitation.  Patient seen and examined at bedside immediately, states she is very stressed out, does not trust the hospital staff, and is attempting to call the police.  As per nursing, patient received dilaudid a few hours ago.  Upon 's arrival, patient seemed to calm down.  On last admission, patient with similar episode, responded well to anxiolytic.  VSS.    Anxiety, possibly hospital delirium and/or side effects of pain medication  - Reassurance  - Xanax 0.5mg TID PRN  - No med/psych evaluation for now  - 1-to-1 observation  - Will continue to monitor  - Discussed with Dr. Spencer

## 2019-07-03 LAB
ALBUMIN SERPL ELPH-MCNC: 2.7 G/DL — LOW (ref 3.3–5)
ALP SERPL-CCNC: 44 U/L — SIGNIFICANT CHANGE UP (ref 40–120)
ALT FLD-CCNC: 17 U/L — SIGNIFICANT CHANGE UP (ref 12–78)
ANION GAP SERPL CALC-SCNC: 6 MMOL/L — SIGNIFICANT CHANGE UP (ref 5–17)
AST SERPL-CCNC: 24 U/L — SIGNIFICANT CHANGE UP (ref 15–37)
BILIRUB SERPL-MCNC: 0.4 MG/DL — SIGNIFICANT CHANGE UP (ref 0.2–1.2)
BUN SERPL-MCNC: 18 MG/DL — SIGNIFICANT CHANGE UP (ref 7–23)
CALCIUM SERPL-MCNC: 8.1 MG/DL — LOW (ref 8.5–10.1)
CHLORIDE SERPL-SCNC: 106 MMOL/L — SIGNIFICANT CHANGE UP (ref 96–108)
CO2 SERPL-SCNC: 29 MMOL/L — SIGNIFICANT CHANGE UP (ref 22–31)
CREAT SERPL-MCNC: 0.77 MG/DL — SIGNIFICANT CHANGE UP (ref 0.5–1.3)
GLUCOSE SERPL-MCNC: 96 MG/DL — SIGNIFICANT CHANGE UP (ref 70–99)
HCT VFR BLD CALC: 28.3 % — LOW (ref 34.5–45)
HGB BLD-MCNC: 9.5 G/DL — LOW (ref 11.5–15.5)
MAGNESIUM SERPL-MCNC: 1.9 MG/DL — SIGNIFICANT CHANGE UP (ref 1.6–2.6)
MCHC RBC-ENTMCNC: 31.1 PG — SIGNIFICANT CHANGE UP (ref 27–34)
MCHC RBC-ENTMCNC: 33.6 GM/DL — SIGNIFICANT CHANGE UP (ref 32–36)
MCV RBC AUTO: 92.8 FL — SIGNIFICANT CHANGE UP (ref 80–100)
NRBC # BLD: 0 /100 WBCS — SIGNIFICANT CHANGE UP (ref 0–0)
PHOSPHATE SERPL-MCNC: 2 MG/DL — LOW (ref 2.5–4.5)
PLATELET # BLD AUTO: 190 K/UL — SIGNIFICANT CHANGE UP (ref 150–400)
POTASSIUM SERPL-MCNC: 4.1 MMOL/L — SIGNIFICANT CHANGE UP (ref 3.5–5.3)
POTASSIUM SERPL-SCNC: 4.1 MMOL/L — SIGNIFICANT CHANGE UP (ref 3.5–5.3)
PROT SERPL-MCNC: 5.9 G/DL — LOW (ref 6–8.3)
RBC # BLD: 3.05 M/UL — LOW (ref 3.8–5.2)
RBC # FLD: 13.9 % — SIGNIFICANT CHANGE UP (ref 10.3–14.5)
SODIUM SERPL-SCNC: 141 MMOL/L — SIGNIFICANT CHANGE UP (ref 135–145)
WBC # BLD: 8.43 K/UL — SIGNIFICANT CHANGE UP (ref 3.8–10.5)
WBC # FLD AUTO: 8.43 K/UL — SIGNIFICANT CHANGE UP (ref 3.8–10.5)

## 2019-07-03 RX ORDER — MORPHINE SULFATE 50 MG/1
2 CAPSULE, EXTENDED RELEASE ORAL EVERY 4 HOURS
Refills: 0 | Status: DISCONTINUED | OUTPATIENT
Start: 2019-07-03 | End: 2019-07-06

## 2019-07-03 RX ORDER — PANTOPRAZOLE SODIUM 20 MG/1
40 TABLET, DELAYED RELEASE ORAL DAILY
Refills: 0 | Status: DISCONTINUED | OUTPATIENT
Start: 2019-07-03 | End: 2019-07-06

## 2019-07-03 RX ORDER — ACETAMINOPHEN 500 MG
1000 TABLET ORAL ONCE
Refills: 0 | Status: COMPLETED | OUTPATIENT
Start: 2019-07-03 | End: 2019-07-03

## 2019-07-03 RX ORDER — ACETAMINOPHEN 500 MG
1000 TABLET ORAL ONCE
Refills: 0 | Status: COMPLETED | OUTPATIENT
Start: 2019-07-04 | End: 2019-07-04

## 2019-07-03 RX ADMIN — PANTOPRAZOLE SODIUM 40 MILLIGRAM(S): 20 TABLET, DELAYED RELEASE ORAL at 13:06

## 2019-07-03 RX ADMIN — Medication 12.5 MICROGRAM(S): at 21:34

## 2019-07-03 RX ADMIN — SODIUM CHLORIDE 100 MILLILITER(S): 9 INJECTION, SOLUTION INTRAVENOUS at 15:29

## 2019-07-03 RX ADMIN — Medication 400 MILLIGRAM(S): at 11:26

## 2019-07-03 RX ADMIN — ENOXAPARIN SODIUM 40 MILLIGRAM(S): 100 INJECTION SUBCUTANEOUS at 11:27

## 2019-07-03 RX ADMIN — Medication 1000 MILLIGRAM(S): at 18:50

## 2019-07-03 RX ADMIN — Medication 400 MILLIGRAM(S): at 18:38

## 2019-07-03 RX ADMIN — Medication 1000 MILLIGRAM(S): at 11:45

## 2019-07-04 LAB
ANION GAP SERPL CALC-SCNC: 11 MMOL/L — SIGNIFICANT CHANGE UP (ref 5–17)
BUN SERPL-MCNC: 11 MG/DL — SIGNIFICANT CHANGE UP (ref 7–23)
CALCIUM SERPL-MCNC: 7.9 MG/DL — LOW (ref 8.5–10.1)
CHLORIDE SERPL-SCNC: 105 MMOL/L — SIGNIFICANT CHANGE UP (ref 96–108)
CO2 SERPL-SCNC: 26 MMOL/L — SIGNIFICANT CHANGE UP (ref 22–31)
CREAT SERPL-MCNC: 0.54 MG/DL — SIGNIFICANT CHANGE UP (ref 0.5–1.3)
GLUCOSE SERPL-MCNC: 60 MG/DL — LOW (ref 70–99)
HCT VFR BLD CALC: 28.7 % — LOW (ref 34.5–45)
HGB BLD-MCNC: 9.6 G/DL — LOW (ref 11.5–15.5)
MAGNESIUM SERPL-MCNC: 1.8 MG/DL — SIGNIFICANT CHANGE UP (ref 1.6–2.6)
MCHC RBC-ENTMCNC: 31.5 PG — SIGNIFICANT CHANGE UP (ref 27–34)
MCHC RBC-ENTMCNC: 33.4 GM/DL — SIGNIFICANT CHANGE UP (ref 32–36)
MCV RBC AUTO: 94.1 FL — SIGNIFICANT CHANGE UP (ref 80–100)
NRBC # BLD: 0 /100 WBCS — SIGNIFICANT CHANGE UP (ref 0–0)
PHOSPHATE SERPL-MCNC: 1.8 MG/DL — LOW (ref 2.5–4.5)
PLATELET # BLD AUTO: 178 K/UL — SIGNIFICANT CHANGE UP (ref 150–400)
POTASSIUM SERPL-MCNC: 3.6 MMOL/L — SIGNIFICANT CHANGE UP (ref 3.5–5.3)
POTASSIUM SERPL-SCNC: 3.6 MMOL/L — SIGNIFICANT CHANGE UP (ref 3.5–5.3)
RBC # BLD: 3.05 M/UL — LOW (ref 3.8–5.2)
RBC # FLD: 14.3 % — SIGNIFICANT CHANGE UP (ref 10.3–14.5)
SODIUM SERPL-SCNC: 142 MMOL/L — SIGNIFICANT CHANGE UP (ref 135–145)
WBC # BLD: 8.21 K/UL — SIGNIFICANT CHANGE UP (ref 3.8–10.5)
WBC # FLD AUTO: 8.21 K/UL — SIGNIFICANT CHANGE UP (ref 3.8–10.5)

## 2019-07-04 RX ORDER — DIPHENHYDRAMINE HCL 50 MG
25 CAPSULE ORAL EVERY 4 HOURS
Refills: 0 | Status: DISCONTINUED | OUTPATIENT
Start: 2019-07-04 | End: 2019-07-05

## 2019-07-04 RX ORDER — SODIUM CHLORIDE 9 MG/ML
1000 INJECTION, SOLUTION INTRAVENOUS
Refills: 0 | Status: DISCONTINUED | OUTPATIENT
Start: 2019-07-04 | End: 2019-07-06

## 2019-07-04 RX ORDER — ACETAMINOPHEN 500 MG
1000 TABLET ORAL ONCE
Refills: 0 | Status: COMPLETED | OUTPATIENT
Start: 2019-07-04 | End: 2019-07-04

## 2019-07-04 RX ORDER — NYSTATIN CREAM 100000 [USP'U]/G
1 CREAM TOPICAL
Refills: 0 | Status: DISCONTINUED | OUTPATIENT
Start: 2019-07-04 | End: 2019-07-05

## 2019-07-04 RX ORDER — POTASSIUM PHOSPHATE, MONOBASIC POTASSIUM PHOSPHATE, DIBASIC 236; 224 MG/ML; MG/ML
15 INJECTION, SOLUTION INTRAVENOUS ONCE
Refills: 0 | Status: COMPLETED | OUTPATIENT
Start: 2019-07-04 | End: 2019-07-04

## 2019-07-04 RX ORDER — SODIUM CHLORIDE 9 MG/ML
1000 INJECTION, SOLUTION INTRAVENOUS
Refills: 0 | Status: DISCONTINUED | OUTPATIENT
Start: 2019-07-04 | End: 2019-07-04

## 2019-07-04 RX ADMIN — POTASSIUM PHOSPHATE, MONOBASIC POTASSIUM PHOSPHATE, DIBASIC 62.5 MILLIMOLE(S): 236; 224 INJECTION, SOLUTION INTRAVENOUS at 16:53

## 2019-07-04 RX ADMIN — Medication 12.5 MICROGRAM(S): at 21:52

## 2019-07-04 RX ADMIN — Medication 400 MILLIGRAM(S): at 04:39

## 2019-07-04 RX ADMIN — Medication 1000 MILLIGRAM(S): at 09:30

## 2019-07-04 RX ADMIN — ENOXAPARIN SODIUM 40 MILLIGRAM(S): 100 INJECTION SUBCUTANEOUS at 11:54

## 2019-07-04 RX ADMIN — Medication 1000 MILLIGRAM(S): at 05:00

## 2019-07-04 RX ADMIN — Medication 400 MILLIGRAM(S): at 09:03

## 2019-07-04 RX ADMIN — Medication 25 MILLIGRAM(S): at 21:51

## 2019-07-04 RX ADMIN — PANTOPRAZOLE SODIUM 40 MILLIGRAM(S): 20 TABLET, DELAYED RELEASE ORAL at 11:54

## 2019-07-04 NOTE — DIETITIAN INITIAL EVALUATION ADULT. - ADD RECOMMEND
Recommend clear liquid diet with ensure clear (apple) TID when medically feasible and advance as tolerated to Low fiber. Recommend MVI with minerals daily. Recommend D5 IVF if to remain NPO to prevent hypoglycemia.

## 2019-07-04 NOTE — DIETITIAN INITIAL EVALUATION ADULT. - SIGNS/SYMPTOMS
as evidenced by s/p repair of multiple perfations of SB, colostomy reversal POD#3, NGT to LWS, -BS as evidenced by altered GI function, NPO x 4 days with NGT to LWS.

## 2019-07-04 NOTE — DIETITIAN INITIAL EVALUATION ADULT. - ETIOLOGY
related to alteration in GI tract structure/function related to decreased ability to consume sufficient energy

## 2019-07-04 NOTE — DIETITIAN INITIAL EVALUATION ADULT. - FACTORS AFF FOOD INTAKE
other (specify)/s/p mobilization of splenic flexure, repair of multiple perforations of small intestines and reversal of colostomy POD#3, NGT to LWS

## 2019-07-04 NOTE — DIETITIAN INITIAL EVALUATION ADULT. - OTHER INFO
Pt A+Ox4,  at bedside. Well known to this RD. Seen 3/19/19 during past admission. S/p mobilization of splenic flexure, repair of multiple perforations of small intestines and reversal of colostomy POD#3. NGT to LWS. NPO x 4 days. On IVF-LR @100cc/hr. Hypoglycemia noted 7/4, BS 60. Recommend D5 IVF if to remain NPO. MD Villela made aware. Hypoactive BS. -flatus -BM. Awaiting return of bowel function. Pta pt reports tolerating regular diet well with good appetite/po intake. Had lost 7lbs during past hospitalization in march however wt has been stable at 108lb for past few months. Agreeable to ensure clear(apple) when diet initiated.

## 2019-07-05 LAB
ALBUMIN SERPL ELPH-MCNC: 2.7 G/DL — LOW (ref 3.3–5)
ALP SERPL-CCNC: 46 U/L — SIGNIFICANT CHANGE UP (ref 40–120)
ALT FLD-CCNC: 19 U/L — SIGNIFICANT CHANGE UP (ref 12–78)
ANION GAP SERPL CALC-SCNC: 6 MMOL/L — SIGNIFICANT CHANGE UP (ref 5–17)
AST SERPL-CCNC: 18 U/L — SIGNIFICANT CHANGE UP (ref 15–37)
BILIRUB SERPL-MCNC: 0.5 MG/DL — SIGNIFICANT CHANGE UP (ref 0.2–1.2)
BUN SERPL-MCNC: 6 MG/DL — LOW (ref 7–23)
CALCIUM SERPL-MCNC: 8.1 MG/DL — LOW (ref 8.5–10.1)
CHLORIDE SERPL-SCNC: 106 MMOL/L — SIGNIFICANT CHANGE UP (ref 96–108)
CO2 SERPL-SCNC: 29 MMOL/L — SIGNIFICANT CHANGE UP (ref 22–31)
CREAT SERPL-MCNC: 0.58 MG/DL — SIGNIFICANT CHANGE UP (ref 0.5–1.3)
GLUCOSE SERPL-MCNC: 123 MG/DL — HIGH (ref 70–99)
HCT VFR BLD CALC: 29.4 % — LOW (ref 34.5–45)
HGB BLD-MCNC: 9.9 G/DL — LOW (ref 11.5–15.5)
MCHC RBC-ENTMCNC: 31.3 PG — SIGNIFICANT CHANGE UP (ref 27–34)
MCHC RBC-ENTMCNC: 33.7 GM/DL — SIGNIFICANT CHANGE UP (ref 32–36)
MCV RBC AUTO: 93 FL — SIGNIFICANT CHANGE UP (ref 80–100)
NRBC # BLD: 0 /100 WBCS — SIGNIFICANT CHANGE UP (ref 0–0)
PHOSPHATE SERPL-MCNC: 1.9 MG/DL — LOW (ref 2.5–4.5)
PLATELET # BLD AUTO: 198 K/UL — SIGNIFICANT CHANGE UP (ref 150–400)
POTASSIUM SERPL-MCNC: 3.2 MMOL/L — LOW (ref 3.5–5.3)
POTASSIUM SERPL-SCNC: 3.2 MMOL/L — LOW (ref 3.5–5.3)
PROT SERPL-MCNC: 5.9 G/DL — LOW (ref 6–8.3)
RBC # BLD: 3.16 M/UL — LOW (ref 3.8–5.2)
RBC # FLD: 13.8 % — SIGNIFICANT CHANGE UP (ref 10.3–14.5)
SODIUM SERPL-SCNC: 141 MMOL/L — SIGNIFICANT CHANGE UP (ref 135–145)
WBC # BLD: 6.06 K/UL — SIGNIFICANT CHANGE UP (ref 3.8–10.5)
WBC # FLD AUTO: 6.06 K/UL — SIGNIFICANT CHANGE UP (ref 3.8–10.5)

## 2019-07-05 RX ORDER — DIPHENHYDRAMINE HCL 50 MG
50 CAPSULE ORAL EVERY 4 HOURS
Refills: 0 | Status: DISCONTINUED | OUTPATIENT
Start: 2019-07-05 | End: 2019-07-07

## 2019-07-05 RX ORDER — SODIUM,POTASSIUM PHOSPHATES 278-250MG
1 POWDER IN PACKET (EA) ORAL ONCE
Refills: 0 | Status: COMPLETED | OUTPATIENT
Start: 2019-07-05 | End: 2019-07-05

## 2019-07-05 RX ORDER — DIPHENHYDRAMINE HCL 50 MG
25 CAPSULE ORAL EVERY 6 HOURS
Refills: 0 | Status: DISCONTINUED | OUTPATIENT
Start: 2019-07-05 | End: 2019-07-05

## 2019-07-05 RX ORDER — FAMOTIDINE 10 MG/ML
20 INJECTION INTRAVENOUS EVERY 24 HOURS
Refills: 0 | Status: DISCONTINUED | OUTPATIENT
Start: 2019-07-05 | End: 2019-07-07

## 2019-07-05 RX ORDER — POTASSIUM CHLORIDE 20 MEQ
40 PACKET (EA) ORAL ONCE
Refills: 0 | Status: COMPLETED | OUTPATIENT
Start: 2019-07-05 | End: 2019-07-05

## 2019-07-05 RX ADMIN — Medication 1 TABLET(S): at 21:55

## 2019-07-05 RX ADMIN — Medication 1 APPLICATION(S): at 17:10

## 2019-07-05 RX ADMIN — Medication 40 MILLIEQUIVALENT(S): at 21:55

## 2019-07-05 RX ADMIN — Medication 25 MILLIGRAM(S): at 08:28

## 2019-07-05 RX ADMIN — PANTOPRAZOLE SODIUM 40 MILLIGRAM(S): 20 TABLET, DELAYED RELEASE ORAL at 11:09

## 2019-07-05 RX ADMIN — FAMOTIDINE 20 MILLIGRAM(S): 10 INJECTION INTRAVENOUS at 17:09

## 2019-07-05 RX ADMIN — ENOXAPARIN SODIUM 40 MILLIGRAM(S): 100 INJECTION SUBCUTANEOUS at 11:08

## 2019-07-05 RX ADMIN — SODIUM CHLORIDE 75 MILLILITER(S): 9 INJECTION, SOLUTION INTRAVENOUS at 15:35

## 2019-07-05 RX ADMIN — Medication 12.5 MICROGRAM(S): at 22:03

## 2019-07-05 RX ADMIN — Medication 50 MILLIGRAM(S): at 23:16

## 2019-07-06 LAB
ANION GAP SERPL CALC-SCNC: 6 MMOL/L — SIGNIFICANT CHANGE UP (ref 5–17)
BUN SERPL-MCNC: 4 MG/DL — LOW (ref 7–23)
CALCIUM SERPL-MCNC: 8.4 MG/DL — LOW (ref 8.5–10.1)
CHLORIDE SERPL-SCNC: 106 MMOL/L — SIGNIFICANT CHANGE UP (ref 96–108)
CO2 SERPL-SCNC: 30 MMOL/L — SIGNIFICANT CHANGE UP (ref 22–31)
CREAT SERPL-MCNC: 0.79 MG/DL — SIGNIFICANT CHANGE UP (ref 0.5–1.3)
GLUCOSE SERPL-MCNC: 166 MG/DL — HIGH (ref 70–99)
HCT VFR BLD CALC: 30.6 % — LOW (ref 34.5–45)
HGB BLD-MCNC: 10.3 G/DL — LOW (ref 11.5–15.5)
MCHC RBC-ENTMCNC: 31.3 PG — SIGNIFICANT CHANGE UP (ref 27–34)
MCHC RBC-ENTMCNC: 33.7 GM/DL — SIGNIFICANT CHANGE UP (ref 32–36)
MCV RBC AUTO: 93 FL — SIGNIFICANT CHANGE UP (ref 80–100)
NRBC # BLD: 0 /100 WBCS — SIGNIFICANT CHANGE UP (ref 0–0)
PHOSPHATE SERPL-MCNC: 2.4 MG/DL — LOW (ref 2.5–4.5)
PLATELET # BLD AUTO: 206 K/UL — SIGNIFICANT CHANGE UP (ref 150–400)
POTASSIUM SERPL-MCNC: 3.8 MMOL/L — SIGNIFICANT CHANGE UP (ref 3.5–5.3)
POTASSIUM SERPL-SCNC: 3.8 MMOL/L — SIGNIFICANT CHANGE UP (ref 3.5–5.3)
RBC # BLD: 3.29 M/UL — LOW (ref 3.8–5.2)
RBC # FLD: 14 % — SIGNIFICANT CHANGE UP (ref 10.3–14.5)
SODIUM SERPL-SCNC: 142 MMOL/L — SIGNIFICANT CHANGE UP (ref 135–145)
WBC # BLD: 6.26 K/UL — SIGNIFICANT CHANGE UP (ref 3.8–10.5)
WBC # FLD AUTO: 6.26 K/UL — SIGNIFICANT CHANGE UP (ref 3.8–10.5)

## 2019-07-06 RX ORDER — POTASSIUM PHOSPHATE, MONOBASIC POTASSIUM PHOSPHATE, DIBASIC 236; 224 MG/ML; MG/ML
15 INJECTION, SOLUTION INTRAVENOUS ONCE
Refills: 0 | Status: COMPLETED | OUTPATIENT
Start: 2019-07-06 | End: 2019-07-06

## 2019-07-06 RX ADMIN — Medication 50 MILLIGRAM(S): at 21:22

## 2019-07-06 RX ADMIN — PANTOPRAZOLE SODIUM 40 MILLIGRAM(S): 20 TABLET, DELAYED RELEASE ORAL at 11:29

## 2019-07-06 RX ADMIN — ENOXAPARIN SODIUM 40 MILLIGRAM(S): 100 INJECTION SUBCUTANEOUS at 11:29

## 2019-07-06 RX ADMIN — Medication 1 APPLICATION(S): at 05:11

## 2019-07-06 RX ADMIN — FAMOTIDINE 20 MILLIGRAM(S): 10 INJECTION INTRAVENOUS at 17:27

## 2019-07-06 RX ADMIN — Medication 1 APPLICATION(S): at 17:27

## 2019-07-06 RX ADMIN — SODIUM CHLORIDE 75 MILLILITER(S): 9 INJECTION, SOLUTION INTRAVENOUS at 05:10

## 2019-07-06 RX ADMIN — POTASSIUM PHOSPHATE, MONOBASIC POTASSIUM PHOSPHATE, DIBASIC 62.5 MILLIMOLE(S): 236; 224 INJECTION, SOLUTION INTRAVENOUS at 15:20

## 2019-07-06 RX ADMIN — Medication 12.5 MICROGRAM(S): at 21:22

## 2019-07-07 VITALS
TEMPERATURE: 98 F | OXYGEN SATURATION: 98 % | DIASTOLIC BLOOD PRESSURE: 68 MMHG | SYSTOLIC BLOOD PRESSURE: 104 MMHG | HEART RATE: 73 BPM | RESPIRATION RATE: 18 BRPM

## 2019-07-07 LAB
ANION GAP SERPL CALC-SCNC: 3 MMOL/L — LOW (ref 5–17)
BUN SERPL-MCNC: 8 MG/DL — SIGNIFICANT CHANGE UP (ref 7–23)
CALCIUM SERPL-MCNC: 8.6 MG/DL — SIGNIFICANT CHANGE UP (ref 8.5–10.1)
CHLORIDE SERPL-SCNC: 104 MMOL/L — SIGNIFICANT CHANGE UP (ref 96–108)
CO2 SERPL-SCNC: 34 MMOL/L — HIGH (ref 22–31)
CREAT SERPL-MCNC: 0.75 MG/DL — SIGNIFICANT CHANGE UP (ref 0.5–1.3)
GLUCOSE SERPL-MCNC: 105 MG/DL — HIGH (ref 70–99)
HCT VFR BLD CALC: 31.5 % — LOW (ref 34.5–45)
HGB BLD-MCNC: 10.4 G/DL — LOW (ref 11.5–15.5)
MCHC RBC-ENTMCNC: 31.1 PG — SIGNIFICANT CHANGE UP (ref 27–34)
MCHC RBC-ENTMCNC: 33 GM/DL — SIGNIFICANT CHANGE UP (ref 32–36)
MCV RBC AUTO: 94.3 FL — SIGNIFICANT CHANGE UP (ref 80–100)
NRBC # BLD: 0 /100 WBCS — SIGNIFICANT CHANGE UP (ref 0–0)
PHOSPHATE SERPL-MCNC: 3.9 MG/DL — SIGNIFICANT CHANGE UP (ref 2.5–4.5)
PLATELET # BLD AUTO: 206 K/UL — SIGNIFICANT CHANGE UP (ref 150–400)
POTASSIUM SERPL-MCNC: 4.1 MMOL/L — SIGNIFICANT CHANGE UP (ref 3.5–5.3)
POTASSIUM SERPL-SCNC: 4.1 MMOL/L — SIGNIFICANT CHANGE UP (ref 3.5–5.3)
RBC # BLD: 3.34 M/UL — LOW (ref 3.8–5.2)
RBC # FLD: 13.9 % — SIGNIFICANT CHANGE UP (ref 10.3–14.5)
SODIUM SERPL-SCNC: 141 MMOL/L — SIGNIFICANT CHANGE UP (ref 135–145)
WBC # BLD: 6.27 K/UL — SIGNIFICANT CHANGE UP (ref 3.8–10.5)
WBC # FLD AUTO: 6.27 K/UL — SIGNIFICANT CHANGE UP (ref 3.8–10.5)

## 2019-07-07 PROCEDURE — 80053 COMPREHEN METABOLIC PANEL: CPT

## 2019-07-07 PROCEDURE — C1889: CPT

## 2019-07-07 PROCEDURE — 80048 BASIC METABOLIC PNL TOTAL CA: CPT

## 2019-07-07 PROCEDURE — 84100 ASSAY OF PHOSPHORUS: CPT

## 2019-07-07 PROCEDURE — 88304 TISSUE EXAM BY PATHOLOGIST: CPT

## 2019-07-07 PROCEDURE — 83735 ASSAY OF MAGNESIUM: CPT

## 2019-07-07 PROCEDURE — 36415 COLL VENOUS BLD VENIPUNCTURE: CPT

## 2019-07-07 PROCEDURE — 85027 COMPLETE CBC AUTOMATED: CPT

## 2019-07-07 PROCEDURE — 82962 GLUCOSE BLOOD TEST: CPT

## 2019-07-07 RX ORDER — DOCUSATE SODIUM 100 MG
1 CAPSULE ORAL
Qty: 90 | Refills: 0
Start: 2019-07-07 | End: 2019-08-05

## 2019-07-07 RX ADMIN — ENOXAPARIN SODIUM 40 MILLIGRAM(S): 100 INJECTION SUBCUTANEOUS at 11:22

## 2019-07-07 RX ADMIN — Medication 50 MILLIGRAM(S): at 05:32

## 2019-07-07 RX ADMIN — Medication 50 MILLIGRAM(S): at 09:42

## 2019-07-07 RX ADMIN — Medication 1 APPLICATION(S): at 05:32

## 2019-07-07 NOTE — DISCHARGE NOTE PROVIDER - HOSPITAL COURSE
Pt had a colostomy and on the day of admission had a reversal of her colostomy.  She did well post operatively but developed a rash in the area of the Surgical prep.  Her GI function gradually returned and on the day of discharge she was tolerating a regular diet with normal bowel movements.  Plans for follow up in the office in one week have been made.

## 2019-07-07 NOTE — DISCHARGE NOTE PROVIDER - CARE PROVIDER_API CALL
Armando Spencer (MD)  Surgery  [address], [address]  [place] [address]  Phone: [phone]  Fax: [phone]  Follow Up Time: 1 week

## 2019-07-07 NOTE — DISCHARGE NOTE PROVIDER - NSDCACTIVITY_GEN_ALL_CORE
Walking - Outdoors allowed/Walking - Indoors allowed/Do not drive or operate machinery/Stairs allowed/No heavy lifting/straining/Showering allowed

## 2019-07-07 NOTE — PROGRESS NOTE ADULT - ASSESSMENT
IMPRESSION pt doing well  PLAN d/c home          follow up in office
78 yo s/p colostomy reversal       -cont npo/ngt/ivf       -encourage ambulation       -changed pain meds to decrease chance of confusion
78 yo s/p jonah's reversal      d/c ngt      ambulate      cont npo/ngt
IMPRESSION pt improving  PLAN replace phosphorus           advance diet           discharge planning
IMPRESSION rash                      hypokalemia  PLAN replace kcl           start steroid cream and visteril
PLAN d/c abdominal binder            benadryl prn           clamp NGT if low residual d/c

## 2019-07-07 NOTE — DISCHARGE NOTE NURSING/CASE MANAGEMENT/SOCIAL WORK - NSDCDPATPORTLINK_GEN_ALL_CORE
You can access the [facility] Patient Portal, offered by [facility], by registering with the following website: [email or web address]

## 2019-07-07 NOTE — PROGRESS NOTE ADULT - SUBJECTIVE AND OBJECTIVE BOX
pt seen  confused overnight  labile  bloody drainage from ngt  -f-bm  slight pain  ICU Vital Signs Last 24 Hrs  T(C): 36.7 (03 Jul 2019 08:40), Max: 37.5 (02 Jul 2019 19:19)  T(F): 98 (03 Jul 2019 08:40), Max: 99.5 (02 Jul 2019 19:19)  HR: 73 (03 Jul 2019 08:40) (73 - 99)  BP: 133/68 (03 Jul 2019 08:40) (113/76 - 160/77)  BP(mean): --  ABP: --  ABP(mean): --  RR: 17 (03 Jul 2019 08:40) (17 - 18)  SpO2: 99% (03 Jul 2019 08:40) (96% - 100%)  gen-NAD  resp-clear  abd-soft NT/ND, incision c/d/i, ostomy site clean                          9.5    8.43  )-----------( 190      ( 03 Jul 2019 06:45 )             28.3   07-03    141  |  106  |  18  ----------------------------<  96  4.1   |  29  |  0.77    Ca    8.1<L>      03 Jul 2019 06:45  Phos  2.0     07-03  Mg     1.9     07-03    TPro  5.9<L>  /  Alb  2.7<L>  /  TBili  0.4  /  DBili  x   /  AST  24  /  ALT  17  /  AlkPhos  44  07-03    I&O's Detail    02 Jul 2019 07:01  -  03 Jul 2019 07:00  --------------------------------------------------------  IN:    lactated ringers.: 1080 mL    Oral Fluid: 100 mL  Total IN: 1180 mL    OUT:    Indwelling Catheter - Urethral: 300 mL    Intermittent Catheterization - Urethral: 600 mL    Nasoenteral Tube: 100 mL    Voided: 500 mL  Total OUT: 1500 mL    Total NET: -320 mL
Post Op Day _1__ of Anesthesia Pain Management Service    SUBJECTIVE: no c/o, had excellent pain relief before block wore off.  		  OBJECTIVE:     Pain Score:      1 /10- while block working    Therapy:	[  ] IV PCA	  [  ] Epidural     [  ] s/p Spinal Opioid     [x ] Peripheral nerve block-B/L TAP  	  Vital Signs Last 24 Hrs  T(C): 36.8 (02 Jul 2019 08:05), Max: 37.2 (01 Jul 2019 20:59)  T(F): 98.2 (02 Jul 2019 08:05), Max: 98.9 (01 Jul 2019 20:59)  HR: 66 (02 Jul 2019 08:05) (62 - 86)  BP: 118/65 (02 Jul 2019 08:05) (104/40 - 154/66)  BP(mean): --  RR: 18 (02 Jul 2019 08:05) (12 - 18)  SpO2: 99% (02 Jul 2019 08:05) (95% - 100%)    ( + ) Alert & Oriented       (-  ) Nausea     ( - ) Pruritis     (-  ) Headache    (  ) Catheter Site Unremarkable    (x)  N/A    Assessment of Catheter Site:	[  ] Intact   [ x  ] N/A	[  ] Other:    ( x ) Further Pain Rx Plan:  Oral/Parenteral Pain medication prn
Subjective: ' I feel better, had a BM'    Objective:  Vital Signs Last 24 Hrs  T(C): 37.1 (06 Jul 2019 08:18), Max: 37.2 (05 Jul 2019 15:58)  T(F): 98.7 (06 Jul 2019 08:18), Max: 98.9 (05 Jul 2019 15:58)  HR: 70 (06 Jul 2019 08:18) (62 - 75)  BP: 103/67 (06 Jul 2019 08:18) (103/67 - 134/72)  BP(mean): --  RR: 18 (06 Jul 2019 08:18) (17 - 18)  SpO2: 99% (06 Jul 2019 08:18) (98% - 99%)    Heent: AZUCENA AUSTIN  Pul: clear  Cor: RSR, without murmurs  Abdomen: rash slightly improved, normal bowel sounds, without distension  Extremities: without edema, motor/sensory intact, without calf pain, Homans sign negative.                        10.3   6.26  )-----------( 206      ( 06 Jul 2019 09:03 )             30.6       07-06    142  |  106  |  4<L>  ----------------------------<  166<H>  3.8   |  30  |  0.79    Ca    8.4<L>      06 Jul 2019 09:03  Phos  2.4     07-06    TPro  5.9<L>  /  Alb  2.7<L>  /  TBili  0.5  /  DBili  x   /  AST  18  /  ALT  19  /  AlkPhos  46  07-05 07-05 @ 07:01  -  07-06 @ 07:00  --------------------------------------------------------  IN:    dextrose 5% + sodium chloride 0.45%.: 1800 mL    Oral Fluid: 900 mL  Total IN: 2700 mL    OUT:    Voided: 2500 mL  Total OUT: 2500 mL    Total NET: 200 mL      07-06 @ 07:01  -  07-06 @ 13:31  --------------------------------------------------------  IN:    dextrose 5% + sodium chloride 0.45%.: 225 mL    Oral Fluid: 350 mL  Total IN: 575 mL    OUT:    Voided: 400 mL  Total OUT: 400 mL    Total NET: 175 mL
Subjective: Pt with flatus, without BM,    Objective:  Vital Signs Last 24 Hrs  T(C): 37.6 (04 Jul 2019 07:39), Max: 37.6 (04 Jul 2019 07:39)  T(F): 99.6 (04 Jul 2019 07:39), Max: 99.6 (04 Jul 2019 07:39)  HR: 75 (04 Jul 2019 07:39) (64 - 75)  BP: 134/66 (04 Jul 2019 07:39) (124/72 - 134/66)  BP(mean): --  RR: 18 (04 Jul 2019 07:39) (18 - 18)  SpO2: 96% (04 Jul 2019 07:39) (96% - 98%)    Heent: AZUCENA AUSTIN  Pul: clear  Cor: RSR, without murmurs  Abdomen: normal bowel sounds, without distension  Incision clean and closed, with rash from abdominal binder  Extremities: without edema, motor/sensory intact, without calf pain, Homans sign negative.                        9.6    8.21  )-----------( 178      ( 04 Jul 2019 03:44 )             28.7       07-04    142  |  105  |  11  ----------------------------<  60<L>  3.6   |  26  |  0.54    Ca    7.9<L>      04 Jul 2019 03:44  Phos  1.8     07-04  Mg     1.8     07-04    TPro  5.9<L>  /  Alb  2.7<L>  /  TBili  0.4  /  DBili  x   /  AST  24  /  ALT  17  /  AlkPhos  44  07-03 07-03 @ 07:01  - 07-04 @ 07:00  --------------------------------------------------------  IN:    lactated ringers.: 2400 mL    Solution: 200 mL  Total IN: 2600 mL    OUT:    Nasoenteral Tube: 500 mL    Voided: 1900 mL  Total OUT: 2400 mL    Total NET: 200 mL      07-04 @ 07:01  -  07-04 @ 13:51  --------------------------------------------------------  IN:  Total IN: 0 mL    OUT:    Voided: 1000 mL  Total OUT: 1000 mL    Total NET: -1000 mL
Subjective: Pt with small BM, still c/o rash    Objective:  Vital Signs Last 24 Hrs  T(C): 36.9 (05 Jul 2019 08:44), Max: 37.1 (04 Jul 2019 23:21)  T(F): 98.4 (05 Jul 2019 08:44), Max: 98.7 (04 Jul 2019 23:21)  HR: 72 (05 Jul 2019 08:44) (68 - 74)  BP: 145/68 (05 Jul 2019 08:44) (144/70 - 157/71)  BP(mean): --  RR: 18 (05 Jul 2019 08:44) (14 - 18)  SpO2: 96% (05 Jul 2019 08:44) (96% - 98%)    Heent: GEOVANNAAZUCENA  Pul: clear   Cor: RSR, without murmurs  Abdomen: with rash, normal bowel sounds, without distension  Incision clean and closed  Extremities: without edema, motor/sensory intact, without calf pain, Homans sign negative.                        9.9    6.06  )-----------( 198      ( 05 Jul 2019 07:25 )             29.4       07-05    141  |  106  |  6<L>  ----------------------------<  123<H>  3.2<L>   |  29  |  0.58    Ca    8.1<L>      05 Jul 2019 07:25  Phos  1.9     07-05  Mg     1.8     07-04    TPro  5.9<L>  /  Alb  2.7<L>  /  TBili  0.5  /  DBili  x   /  AST  18  /  ALT  19  /  AlkPhos  46  07-05 07-04 @ 07:01  -  07-05 @ 07:00  --------------------------------------------------------  IN:  Total IN: 0 mL    OUT:    Nasoenteral Tube: 230 mL    Stool: 1 mL    Voided: 1000 mL  Total OUT: 1231 mL    Total NET: -1231 mL      07-05 @ 07:01  -  07-05 @ 13:12  --------------------------------------------------------  IN:  Total IN: 0 mL    OUT:    Voided: 400 mL  Total OUT: 400 mL    Total NET: -400 mL
The patient was interviewed and evaluated.    79y Female, walking halls.    T(C): 36.8 (07-02-19 @ 08:05), Max: 37.2 (07-01-19 @ 20:59)  HR: 66 (07-02-19 @ 08:05) (62 - 86)  BP: 118/65 (07-02-19 @ 08:05) (104/40 - 154/66)  RR: 18 (07-02-19 @ 08:05) (12 - 18)  SpO2: 99% (07-02-19 @ 08:05) (95% - 100%)  Wt(kg): --    No Nausea/vomiting, recall, sore throat or headache.    No anesthesia related complaints or sequelae.
pt seen  c/o pain on ambulating  -n-v  -f-bm  ICU Vital Signs Last 24 Hrs  T(C): 36.8 (02 Jul 2019 08:05), Max: 37.2 (01 Jul 2019 20:59)  T(F): 98.2 (02 Jul 2019 08:05), Max: 98.9 (01 Jul 2019 20:59)  HR: 66 (02 Jul 2019 08:05) (62 - 86)  BP: 118/65 (02 Jul 2019 08:05) (104/40 - 154/66)  BP(mean): --  ABP: --  ABP(mean): --  RR: 18 (02 Jul 2019 08:05) (12 - 18)  SpO2: 99% (02 Jul 2019 08:05) (95% - 100%)  gen-NAD  resp-clear  abd-soft ND, appropriate tenderness  dressing c/d/i                          10.5   9.59  )-----------( 204      ( 02 Jul 2019 07:51 )             32.0   07-02    141  |  108  |  14  ----------------------------<  97  4.5   |  27  |  1.00    Ca    8.1<L>      02 Jul 2019 07:51  Phos  4.4     07-02  Mg     1.9     07-02      I&O's Detail    01 Jul 2019 07:01  -  02 Jul 2019 07:00  --------------------------------------------------------  IN:    lactated ringers.: 300 mL  Total IN: 300 mL    OUT:    Indwelling Catheter - Urethral: 620 mL    Nasoenteral Tube: 20 mL  Total OUT: 640 mL    Total NET: -340 mL      02 Jul 2019 07:01  -  02 Jul 2019 13:21  --------------------------------------------------------  IN:    lactated ringers.: 380 mL  Total IN: 380 mL    OUT:    Indwelling Catheter - Urethral: 300 mL  Total OUT: 300 mL    Total NET: 80 mL
Subjective: pt OOB, tolerating po, with normal bowel movements    Objective:  Vital Signs Last 24 Hrs  T(C): 36.8 (07 Jul 2019 07:48), Max: 36.9 (06 Jul 2019 15:47)  T(F): 98.3 (07 Jul 2019 07:48), Max: 98.4 (06 Jul 2019 15:47)  HR: 73 (07 Jul 2019 07:48) (72 - 81)  BP: 129/69 (07 Jul 2019 07:48) (103/64 - 129/69)  BP(mean): --  RR: 18 (07 Jul 2019 07:48) (16 - 18)  SpO2: 98% (07 Jul 2019 07:48) (95% - 98%)    Heent: GEOVANNA, FREOM  Pul: clear  Cor: RSR, without murmurs  Abdomen: normal bowel sounds, without distension or tenderness, rash improving  Incision clean and closed, 1/2 staples d/judith  Extremities: without edema, motor/sensory intact, without calf pain, Homans sign negative.                        10.4   6.27  )-----------( 206      ( 07 Jul 2019 07:13 )             31.5       07-07    141  |  104  |  8   ----------------------------<  105<H>  4.1   |  34<H>  |  0.75    Ca    8.6      07 Jul 2019 07:13  Phos  3.9     07-07 07-06 @ 07:01  -  07-07 @ 07:00  --------------------------------------------------------  IN:    dextrose 5% + sodium chloride 0.45%.: 450 mL    Oral Fluid: 1050 mL    Solution: 250 mL  Total IN: 1750 mL    OUT:    Stool: 1 mL    Voided: 1100 mL  Total OUT: 1101 mL    Total NET: 649 mL

## 2019-08-22 NOTE — DISCHARGE NOTE PROVIDER - NSDCCPCAREPLAN_GEN_ALL_CORE_FT
Forwarded to PCP
PRINCIPAL DISCHARGE DIAGNOSIS  Diagnosis: S/P colostomy  Assessment and Plan of Treatment:

## 2019-12-16 NOTE — ED PROVIDER NOTE - PROGRESS NOTE DETAILS
paged GI Dr. Oswald, awaiting call back spoke with Dr. Burgos, case discussed, plan iv abx, iv fluids, pain control, surgical consult paged Dr. Villela, awating call back Retention Suture Bite Size: 3 mm

## 2020-09-04 NOTE — DIETITIAN INITIAL EVALUATION ADULT. - NS AS NUTRI INTERV PARENTERAL
Final Anesthesia Post-op Assessment    Patient: Reggie Estrada  Procedure(s) Performed: ENTEROSCOPY  Anesthesia type: General    Vitals Value Taken Time   Temp 36.7 °C (98.1 °F) 09/04/20 1615   Pulse 45 09/04/20 1615   Resp 16 09/04/20 1615   SpO2 100 % 09/04/20 1615   /75 09/04/20 1615         Patient Location: PACU Phase 1  Post-op Vital Signs:stable  Level of Consciousness: participates in exam, awake, alert and oriented  Respiratory Status: spontaneous ventilation  Cardiovascular blood pressure returned to baseline  Hydration: euvolemic  Pain Management: well controlled  Handoff: Handoff to receiving clinician was performed and questions were answered  Vomiting: none    Airway Patency:patent  Post-op Assessment: awake, alert, appropriately conversant, or baseline, no complications, patient tolerated procedure well with no complications, evidence of recall, dentition within defined limits, moving all extremities and No Corneal Abrasion      No complications documented.    On D5NS @100cc/hr secondary to hypoglycemia.

## 2021-11-09 NOTE — ASU PREOP CHECKLIST - WEIGHT IN KG
49 Libtayo Pregnancy And Lactation Text: This medication is contraindicated in pregnancy and when breast feeding.

## 2022-10-28 NOTE — H&P PST ADULT - TEACHING/LEARNING EDUCATIONAL LEVEL
You will be notified via phone regarding your pending test results. You may also check the [employer] [facility] for test results.   Tylenol or Motrin is recommended as needed for pain or fever.   Seek medical attention for persistent or worsening symptoms.   
[facility]

## 2023-01-25 NOTE — ED PROVIDER NOTE - CARE PROVIDERS DIRECT ADDRESSES
You might feel nauseated today. Eat lightly until you are feeling better. If nausea continues for more than 24 hours, please call your doctor. If you do not feel nauseated, you may eat anything you like for the rest of the day.
,DirectAddress_Unknown

## 2023-02-12 ENCOUNTER — EMERGENCY (EMERGENCY)
Facility: HOSPITAL | Age: 83
LOS: 1 days | Discharge: HOME | End: 2023-02-12
Attending: EMERGENCY MEDICINE | Admitting: EMERGENCY MEDICINE
Payer: COMMERCIAL

## 2023-02-12 VITALS
HEART RATE: 93 BPM | HEIGHT: 63 IN | OXYGEN SATURATION: 100 % | WEIGHT: 111.99 LBS | DIASTOLIC BLOOD PRESSURE: 65 MMHG | RESPIRATION RATE: 16 BRPM | SYSTOLIC BLOOD PRESSURE: 148 MMHG | TEMPERATURE: 97 F

## 2023-02-12 VITALS
SYSTOLIC BLOOD PRESSURE: 110 MMHG | TEMPERATURE: 98 F | DIASTOLIC BLOOD PRESSURE: 70 MMHG | OXYGEN SATURATION: 97 % | RESPIRATION RATE: 16 BRPM | HEART RATE: 61 BPM

## 2023-02-12 DIAGNOSIS — Z90.49 ACQUIRED ABSENCE OF OTHER SPECIFIED PARTS OF DIGESTIVE TRACT: Chronic | ICD-10-CM

## 2023-02-12 DIAGNOSIS — Z98.49 CATARACT EXTRACTION STATUS, UNSPECIFIED EYE: Chronic | ICD-10-CM

## 2023-02-12 DIAGNOSIS — Z98.890 OTHER SPECIFIED POSTPROCEDURAL STATES: Chronic | ICD-10-CM

## 2023-02-12 DIAGNOSIS — Z85.828 PERSONAL HISTORY OF OTHER MALIGNANT NEOPLASM OF SKIN: Chronic | ICD-10-CM

## 2023-02-12 PROBLEM — C43.9 MALIGNANT MELANOMA OF SKIN, UNSPECIFIED: Chronic | Status: ACTIVE | Noted: 2019-06-24

## 2023-02-12 PROBLEM — C44.91 BASAL CELL CARCINOMA OF SKIN, UNSPECIFIED: Chronic | Status: ACTIVE | Noted: 2019-06-24

## 2023-02-12 LAB
ALBUMIN SERPL ELPH-MCNC: 3.8 G/DL — SIGNIFICANT CHANGE UP (ref 3.3–5)
ALP SERPL-CCNC: 56 U/L — SIGNIFICANT CHANGE UP (ref 40–120)
ALT FLD-CCNC: 28 U/L — SIGNIFICANT CHANGE UP (ref 12–78)
ANION GAP SERPL CALC-SCNC: 7 MMOL/L — SIGNIFICANT CHANGE UP (ref 5–17)
APPEARANCE UR: CLEAR — SIGNIFICANT CHANGE UP
AST SERPL-CCNC: 20 U/L — SIGNIFICANT CHANGE UP (ref 15–37)
BASOPHILS # BLD AUTO: 0.03 K/UL — SIGNIFICANT CHANGE UP (ref 0–0.2)
BASOPHILS NFR BLD AUTO: 0.3 % — SIGNIFICANT CHANGE UP (ref 0–2)
BILIRUB SERPL-MCNC: 0.5 MG/DL — SIGNIFICANT CHANGE UP (ref 0.2–1.2)
BILIRUB UR-MCNC: NEGATIVE — SIGNIFICANT CHANGE UP
BUN SERPL-MCNC: 16 MG/DL — SIGNIFICANT CHANGE UP (ref 7–23)
CALCIUM SERPL-MCNC: 9.8 MG/DL — SIGNIFICANT CHANGE UP (ref 8.5–10.1)
CHLORIDE SERPL-SCNC: 101 MMOL/L — SIGNIFICANT CHANGE UP (ref 96–108)
CO2 SERPL-SCNC: 33 MMOL/L — HIGH (ref 22–31)
COLOR SPEC: YELLOW — SIGNIFICANT CHANGE UP
CREAT SERPL-MCNC: 1.1 MG/DL — SIGNIFICANT CHANGE UP (ref 0.5–1.3)
DIFF PNL FLD: NEGATIVE — SIGNIFICANT CHANGE UP
EGFR: 50 ML/MIN/1.73M2 — LOW
EOSINOPHIL # BLD AUTO: 0.03 K/UL — SIGNIFICANT CHANGE UP (ref 0–0.5)
EOSINOPHIL NFR BLD AUTO: 0.3 % — SIGNIFICANT CHANGE UP (ref 0–6)
FLUAV AG NPH QL: SIGNIFICANT CHANGE UP
FLUBV AG NPH QL: SIGNIFICANT CHANGE UP
GLUCOSE SERPL-MCNC: 124 MG/DL — HIGH (ref 70–99)
GLUCOSE UR QL: NEGATIVE — SIGNIFICANT CHANGE UP
HCT VFR BLD CALC: 37.3 % — SIGNIFICANT CHANGE UP (ref 34.5–45)
HGB BLD-MCNC: 12.2 G/DL — SIGNIFICANT CHANGE UP (ref 11.5–15.5)
IMM GRANULOCYTES NFR BLD AUTO: 0.2 % — SIGNIFICANT CHANGE UP (ref 0–0.9)
KETONES UR-MCNC: NEGATIVE — SIGNIFICANT CHANGE UP
LACTATE SERPL-SCNC: 1.4 MMOL/L — SIGNIFICANT CHANGE UP (ref 0.7–2)
LEUKOCYTE ESTERASE UR-ACNC: NEGATIVE — SIGNIFICANT CHANGE UP
LIDOCAIN IGE QN: 103 U/L — SIGNIFICANT CHANGE UP (ref 73–393)
LYMPHOCYTES # BLD AUTO: 0.61 K/UL — LOW (ref 1–3.3)
LYMPHOCYTES # BLD AUTO: 6.5 % — LOW (ref 13–44)
MCHC RBC-ENTMCNC: 31.9 PG — SIGNIFICANT CHANGE UP (ref 27–34)
MCHC RBC-ENTMCNC: 32.7 GM/DL — SIGNIFICANT CHANGE UP (ref 32–36)
MCV RBC AUTO: 97.6 FL — SIGNIFICANT CHANGE UP (ref 80–100)
MONOCYTES # BLD AUTO: 0.32 K/UL — SIGNIFICANT CHANGE UP (ref 0–0.9)
MONOCYTES NFR BLD AUTO: 3.4 % — SIGNIFICANT CHANGE UP (ref 2–14)
NEUTROPHILS # BLD AUTO: 8.44 K/UL — HIGH (ref 1.8–7.4)
NEUTROPHILS NFR BLD AUTO: 89.3 % — HIGH (ref 43–77)
NITRITE UR-MCNC: NEGATIVE — SIGNIFICANT CHANGE UP
NRBC # BLD: 0 /100 WBCS — SIGNIFICANT CHANGE UP (ref 0–0)
PH UR: 8 — SIGNIFICANT CHANGE UP (ref 5–8)
PLATELET # BLD AUTO: 214 K/UL — SIGNIFICANT CHANGE UP (ref 150–400)
POTASSIUM SERPL-MCNC: 3.8 MMOL/L — SIGNIFICANT CHANGE UP (ref 3.5–5.3)
POTASSIUM SERPL-SCNC: 3.8 MMOL/L — SIGNIFICANT CHANGE UP (ref 3.5–5.3)
PROT SERPL-MCNC: 8 G/DL — SIGNIFICANT CHANGE UP (ref 6–8.3)
PROT UR-MCNC: NEGATIVE — SIGNIFICANT CHANGE UP
RBC # BLD: 3.82 M/UL — SIGNIFICANT CHANGE UP (ref 3.8–5.2)
RBC # FLD: 13.1 % — SIGNIFICANT CHANGE UP (ref 10.3–14.5)
RSV RNA NPH QL NAA+NON-PROBE: SIGNIFICANT CHANGE UP
SARS-COV-2 RNA SPEC QL NAA+PROBE: SIGNIFICANT CHANGE UP
SODIUM SERPL-SCNC: 141 MMOL/L — SIGNIFICANT CHANGE UP (ref 135–145)
SP GR SPEC: 1.01 — SIGNIFICANT CHANGE UP (ref 1.01–1.02)
TROPONIN I, HIGH SENSITIVITY RESULT: 4.8 NG/L — SIGNIFICANT CHANGE UP
UROBILINOGEN FLD QL: NEGATIVE — SIGNIFICANT CHANGE UP
WBC # BLD: 9.45 K/UL — SIGNIFICANT CHANGE UP (ref 3.8–10.5)
WBC # FLD AUTO: 9.45 K/UL — SIGNIFICANT CHANGE UP (ref 3.8–10.5)

## 2023-02-12 PROCEDURE — 93005 ELECTROCARDIOGRAM TRACING: CPT

## 2023-02-12 PROCEDURE — 99285 EMERGENCY DEPT VISIT HI MDM: CPT | Mod: 25

## 2023-02-12 PROCEDURE — 93010 ELECTROCARDIOGRAM REPORT: CPT

## 2023-02-12 PROCEDURE — 80053 COMPREHEN METABOLIC PANEL: CPT

## 2023-02-12 PROCEDURE — 85025 COMPLETE CBC W/AUTO DIFF WBC: CPT

## 2023-02-12 PROCEDURE — 87637 SARSCOV2&INF A&B&RSV AMP PRB: CPT

## 2023-02-12 PROCEDURE — 81003 URINALYSIS AUTO W/O SCOPE: CPT

## 2023-02-12 PROCEDURE — 83690 ASSAY OF LIPASE: CPT

## 2023-02-12 PROCEDURE — 36415 COLL VENOUS BLD VENIPUNCTURE: CPT

## 2023-02-12 PROCEDURE — 74177 CT ABD & PELVIS W/CONTRAST: CPT | Mod: MA

## 2023-02-12 PROCEDURE — 96374 THER/PROPH/DIAG INJ IV PUSH: CPT | Mod: XU

## 2023-02-12 PROCEDURE — 83605 ASSAY OF LACTIC ACID: CPT

## 2023-02-12 PROCEDURE — 71045 X-RAY EXAM CHEST 1 VIEW: CPT | Mod: 26

## 2023-02-12 PROCEDURE — 84484 ASSAY OF TROPONIN QUANT: CPT

## 2023-02-12 PROCEDURE — 99285 EMERGENCY DEPT VISIT HI MDM: CPT

## 2023-02-12 PROCEDURE — 96375 TX/PRO/DX INJ NEW DRUG ADDON: CPT

## 2023-02-12 PROCEDURE — 71045 X-RAY EXAM CHEST 1 VIEW: CPT

## 2023-02-12 PROCEDURE — 87086 URINE CULTURE/COLONY COUNT: CPT

## 2023-02-12 PROCEDURE — 74177 CT ABD & PELVIS W/CONTRAST: CPT | Mod: 26,MA

## 2023-02-12 RX ORDER — MORPHINE SULFATE 50 MG/1
2 CAPSULE, EXTENDED RELEASE ORAL ONCE
Refills: 0 | Status: DISCONTINUED | OUTPATIENT
Start: 2023-02-12 | End: 2023-02-12

## 2023-02-12 RX ORDER — ONDANSETRON 8 MG/1
4 TABLET, FILM COATED ORAL ONCE
Refills: 0 | Status: COMPLETED | OUTPATIENT
Start: 2023-02-12 | End: 2023-02-12

## 2023-02-12 RX ORDER — DOCUSATE SODIUM 100 MG
1 CAPSULE ORAL
Qty: 28 | Refills: 0
Start: 2023-02-12 | End: 2023-02-25

## 2023-02-12 RX ORDER — FAMOTIDINE 10 MG/ML
20 INJECTION INTRAVENOUS ONCE
Refills: 0 | Status: COMPLETED | OUTPATIENT
Start: 2023-02-12 | End: 2023-02-12

## 2023-02-12 RX ORDER — DIATRIZOATE MEGLUMINE 180 MG/ML
30 INJECTION, SOLUTION INTRAVESICAL ONCE
Refills: 0 | Status: COMPLETED | OUTPATIENT
Start: 2023-02-12 | End: 2023-02-12

## 2023-02-12 RX ORDER — SODIUM CHLORIDE 9 MG/ML
1000 INJECTION INTRAMUSCULAR; INTRAVENOUS; SUBCUTANEOUS ONCE
Refills: 0 | Status: COMPLETED | OUTPATIENT
Start: 2023-02-12 | End: 2023-02-12

## 2023-02-12 RX ADMIN — FAMOTIDINE 20 MILLIGRAM(S): 10 INJECTION INTRAVENOUS at 03:55

## 2023-02-12 RX ADMIN — DIATRIZOATE MEGLUMINE 30 MILLILITER(S): 180 INJECTION, SOLUTION INTRAVESICAL at 04:09

## 2023-02-12 RX ADMIN — MORPHINE SULFATE 2 MILLIGRAM(S): 50 CAPSULE, EXTENDED RELEASE ORAL at 03:55

## 2023-02-12 RX ADMIN — ONDANSETRON 4 MILLIGRAM(S): 8 TABLET, FILM COATED ORAL at 03:54

## 2023-02-12 RX ADMIN — SODIUM CHLORIDE 1000 MILLILITER(S): 9 INJECTION INTRAMUSCULAR; INTRAVENOUS; SUBCUTANEOUS at 03:54

## 2023-02-12 NOTE — ED PROVIDER NOTE - NSFOLLOWUPINSTRUCTIONS_ED_ALL_ED_FT
1) Follow up with your doctor in 1-2 days  2) Return to the ER for worsening or concerning symptoms      Abdominal Pain, Adult      Pain in the abdomen (abdominal pain) can be caused by many things. Often, abdominal pain is not serious and it gets better with no treatment or by being treated at home. However, sometimes abdominal pain is serious.    Your health care provider will ask questions about your medical history and do a physical exam to try to determine the cause of your abdominal pain.      Follow these instructions at home:    Medicines     •Take over-the-counter and prescription medicines only as told by your health care provider.      • Do not take a laxative unless told by your health care provider.        General instructions      •Watch your condition for any changes.      •Drink enough fluid to keep your urine pale yellow.      •Keep all follow-up visits as told by your health care provider. This is important.        Contact a health care provider if:    •Your abdominal pain changes or gets worse.      •You are not hungry or you lose weight without trying.      •You are constipated or have diarrhea for more than 2–3 days.      •You have pain when you urinate or have a bowel movement.      •Your abdominal pain wakes you up at night.      •Your pain gets worse with meals, after eating, or with certain foods.      •You are vomiting and cannot keep anything down.      •You have a fever.      •You have blood in your urine.        Get help right away if:    •Your pain does not go away as soon as your health care provider told you to expect.      •You cannot stop vomiting.      •Your pain is only in areas of the abdomen, such as the right side or the left lower portion of the abdomen. Pain on the right side could be caused by appendicitis.      •You have bloody or black stools, or stools that look like tar.      •You have severe pain, cramping, or bloating in your abdomen.    •You have signs of dehydration, such as:  •Dark urine, very little urine, or no urine.      •Cracked lips.      •Dry mouth.      •Sunken eyes.      •Sleepiness.      •Weakness.        •You have trouble breathing or chest pain.        Summary    •Often, abdominal pain is not serious and it gets better with no treatment or by being treated at home. However, sometimes abdominal pain is serious.      •Watch your condition for any changes.      •Take over-the-counter and prescription medicines only as told by your health care provider.      •Contact a health care provider if your abdominal pain changes or gets worse.      •Get help right away if you have severe pain, cramping, or bloating in your abdomen.      This information is not intended to replace advice given to you by your health care provider. Make sure you discuss any questions you have with your health care provider.      Document Revised: 02/05/2021 Document Reviewed: 04/27/2020    Elsevier Patient Education © 2022 [employer]

## 2023-02-12 NOTE — ED PROVIDER NOTE - OBJECTIVE STATEMENT
83-year-old female with history of hypothyroidism,, hyperlipidemia, history of diverticulitis, presenting with mid to lower abdominal pain that started around 8 PM shortly after having dinner.  Patient reports pain is more on the right lower quadrant than the left.  Patient reports several episodes of nausea and vomiting after the pain.  Denies fever or chills.  Last bowel movement was yesterday.  Denies chest pain or shortness of breath.  Denies urinary complaints.

## 2023-02-12 NOTE — ED PROVIDER NOTE - WR INTERPRETATION 1
ABD XR negative - non-specific, No free air, No air-fluid levels seenCXR negative - No infiltrates, No consolidation, No atelectasis seen

## 2023-02-12 NOTE — ED ADULT TRIAGE NOTE - CHIEF COMPLAINT QUOTE
Patient brought in by ambulance from home as reported complaining of diffuse abdominal pain with nausea and vomiting started last night

## 2023-02-12 NOTE — ED PROVIDER NOTE - PATIENT PORTAL LINK FT
You can access the FollowMyHealth Patient Portal offered by [facility] by registering at the following website: [email or web address]. By joining [facility]’s FollowMyHealth portal, you will also be able to view your health information using other applications (apps) compatible with our system.

## 2023-02-12 NOTE — ED ADULT NURSE NOTE - NSIMPLEMENTINTERV_GEN_ALL_ED
Implemented All Universal Safety Interventions:  [place] to call system. Call bell, personal items and telephone within reach. Instruct patient to call for assistance. Room bathroom lighting operational. Non-slip footwear when patient is off stretcher. Physically safe environment: no spills, clutter or unnecessary equipment. Stretcher in lowest position, wheels locked, appropriate side rails in place.

## 2023-02-12 NOTE — ED PROVIDER NOTE - DIFFERENTIAL DIAGNOSIS
Differential diagnosis includes appendicitis, diverticulitis, UTI, kidney stone Differential Diagnosis

## 2023-02-12 NOTE — ED PROVIDER NOTE - PROGRESS NOTE DETAILS
Pt re-evaluated. States her pain is resolved. Abd is soft and nontender in all quadrants. CT abd did not visualize the appendix but no surrounding/regional inflammatory changes noted. no bowel obstruction.   Labs are unremarkable  UA neg for UTI.  Pt reports she is constipated and her doctor advised her to take metamucil, no stool softeners. I will prescribe pt stool softener and she is advised to f/u with her doctor in 2 days. If pain returns she is advised to return to the ER. All results were d/w pt and all questions/concerns were addressed.

## 2023-02-12 NOTE — ED PROVIDER NOTE - NSICDXPASTMEDICALHX_GEN_ALL_CORE_FT
PAST MEDICAL HISTORY:  Basal cell carcinoma face left upper lip    Diverticulosis     Hyperlipidemia     Hypothyroidism     Melanoma left arm

## 2023-02-12 NOTE — ED PROVIDER NOTE - CLINICAL SUMMARY MEDICAL DECISION MAKING FREE TEXT BOX
83-year-old female history of hypothyroidism, diverticulitis, hyperlipidemia presenting with lower abdominal pain associated with multiple episode nausea vomiting.  Abdomen is tender mostly in the right lower quadrant and mildly in the left lower quadrant.  Differential diagnosis includes UTI, acute appendicitis, acute diverticulitis, kidney stone.  We will check labs, EKG, chest x-ray, CT abdomen and pelvis.  Will give IV morphine for pain management, IV Zofran, IV fluids.  Will reassess.

## 2023-02-12 NOTE — ED ADULT NURSE NOTE - NSSUHOSCREENINGYN_ED_ALL_ED
VACCINE ADMINISTRATION RECORD  PARENT / GUARDIAN APPROVAL  Date: 2017  Vaccine administered to: Edouard Ramirez     : 2015    MRN: [id number]    A copy of the appropriate Centers for Disease Control and Prevention Vaccine Information statement has
Yes - the patient is able to be screened

## 2023-02-12 NOTE — ED ADULT NURSE NOTE - OBJECTIVE STATEMENT
Brought in by ambulance reports diffuse abdominal pain started 1-2 hours after eating around 8pm last night. Patient denies diarrhea but has constipation yesterday took metamucil. Last BM yesterday. Brought in by ambulance reports diffuse abdominal pain started 1-2 hours after eating around 8pm last night associated with nausea and vomiting. Patient denies diarrhea but has constipation yesterday took metamucil. Last BM yesterday.

## 2023-02-13 LAB
CULTURE RESULTS: SIGNIFICANT CHANGE UP
SPECIMEN SOURCE: SIGNIFICANT CHANGE UP

## 2023-07-05 NOTE — ED PROVIDER NOTE - EYES, MLM
----- Message from Tobias Fernando MD sent at 7/5/2023  8:21 AM CDT -----  Regarding: needs MRA head and neck  This patient I saw on Monday already had her MRI done. I am having my nurse Ade call her and explain in Swedish that we need to do further workup including MRA head and neck, cardiology consult with echo, and some labs. Can you please help with scheduling the MRA head and neck for her in [facility]? Thanks!     Clear bilaterally, pupils equal, round and reactive to light.

## 2024-01-03 NOTE — PROGRESS NOTE ADULT - SUBJECTIVE AND OBJECTIVE BOX
pt seen  no complaints  slight discomfort  ICU Vital Signs Last 24 Hrs  T(C): 37.1 (29 Mar 2019 08:10), Max: 37.1 (29 Mar 2019 08:10)  T(F): 98.7 (29 Mar 2019 08:10), Max: 98.7 (29 Mar 2019 08:10)  HR: 77 (29 Mar 2019 08:10) (75 - 77)  BP: 102/55 (29 Mar 2019 08:10) (102/55 - 108/68)  BP(mean): --  ABP: --  ABP(mean): --  RR: 16 (29 Mar 2019 08:10) (16 - 16)  SpO2: 96% (29 Mar 2019 08:10) (96% - 96%)  gen-NAD  resp-clear  abd-soft ND, nontender  incision c/d/i  ostomy functioning                          10.7   8.58  )-----------( 330      ( 29 Mar 2019 07:18 )             32.6 71

## 2024-07-03 NOTE — PATIENT PROFILE ADULT - BRADEN NUTRITION
Mohs Case Number: [id number] Biopsy Photograph Reviewed: Yes Referring Physician (Optional): Cynthia RADFORD Consent Type: Consent 1 (Standard) Eye Shield Used: No Surgeon Performing Repair (Optional): Dr. Christopher Urban Initial Size Of Lesion: 1 Primary Defect Length In Cm (Final Defect Size - Required For Flaps/Grafts): 1.3 Primary Defect Depth In Cm (Optional But Required For Some Insurers): 0 Repair Type: Flap Which Instrument Did You Use For Dermabrasion?: Wire Brush Which Eyelid Repair Cpt Are You Using?: 52875 Oculoplastic Surgeon Procedure Text (A): After obtaining clear surgical margins the patient was sent to oculoplastics for surgical repair.  The patient understands they will receive post-surgical care and follow-up from the referring physician's office. Otolaryngologist Procedure Text (A): After obtaining clear surgical margins the patient was sent to otolaryngology for surgical repair.  The patient understands they will receive post-surgical care and follow-up from the referring physician's office. Plastic Surgeon Procedure Text (A): After obtaining clear surgical margins the patient was sent to plastics for surgical repair.  The patient understands they will receive post-surgical care and follow-up from the referring physician's office. Mid-Level Procedure Text (A): After obtaining clear surgical margins the patient was sent to a mid-level provider for surgical repair.  The patient understands they will receive post-surgical care and follow-up from the mid-level provider. Provider Procedure Text (A): After obtaining clear surgical margins the defect was repaired by another provider. Asc Procedure Text (A): After obtaining clear surgical margins the patient was sent to an ASC for surgical repair.  The patient understands they will receive post-surgical care and follow-up from the ASC physician. Suturegard Retention Suture: 2-0 Nylon Retention Suture Bite Size: 3 mm Length To Time In Minutes Device Was In Place: 10 Undermining Type: Entire Wound Debridement Text: The wound edges were debrided prior to proceeding with the closure to facilitate wound healing. Helical Rim Text: The closure involved the helical rim. Vermilion Border Text: The closure involved the vermilion border. Nostril Rim Text: The closure involved the nostril rim. Retention Suture Text: Retention sutures were placed to support the closure and prevent dehiscence. Flap Type: Rhomboid Transposition Flap Secondary Defect Length In Cm (Required For Flaps): 2.5 (3) adequate Secondary Defect Width In Cm (Required For Flaps): 1.2 Area H Indication Text: Tumors in this location are included in Area H (eyelids, eyebrows, nose, lips, chin, ear, pre-auricular, post-auricular, temple, genitalia, hands, feet, ankles and areola).  Tissue conservation is critical in these anatomic locations. Area M Indication Text: Tumors in this location are included in Area M (cheek, forehead, scalp, neck, jawline and pretibial skin).  Mohs surgery is indicated for tumors in these anatomic locations. Area L Indication Text: Tumors in this location are included in Area L (trunk and extremities).  Mohs surgery is indicated for larger tumors, or tumors with aggressive histologic features, in these anatomic locations. Tumor Debulked?: not debulked Depth Of Tumor Invasion (For Histology): tumor not visualized (deep and peripheral margins are clear of tumor) Perineural Invasion (For Histology - Be Specific If Possible): absent Surgical Defect Length In Cm (Optional): 1.4 Surgical Defect Width In Cm (Optional): 1.1 Special Stains Stage 1 - Results: Base On Clearance Noted Above Stage 2: Additional Anesthesia Type: 1% lidocaine with epinephrine Include Tumor Staging In Mohs Note?: Please Select the Appropriate Response Staging Info: By selecting yes to the question above you will include information on AJCC 8 tumor staging in your Mohs note. Information on tumor staging will be automatically added for SCCs on the head and neck. AJCC 8 includes tumor size, tumor depth, perineural involvement and bone invasion. Tumor Depth: Less than 6mm from granular layer and no invasion beyond the subcutaneous fat Medical Necessity Statement: Based on my medical judgement, Mohs surgery is the most appropriate treatment for this cancer compared to other treatments. Alternatives Discussed Intro (Do Not Add Period): I discussed alternative treatments to Mohs surgery and specifically discussed the risks and benefits of Consent 1/Introductory Paragraph: The rationale for Mohs was explained to the patient and consent was obtained. The risks, benefits and alternatives to therapy were discussed in detail. Specifically, the risks of infection, scarring, bleeding, prolonged wound healing, incomplete removal, allergy to anesthesia, nerve injury and recurrence were addressed. Prior to the procedure, the treatment site was clearly identified and confirmed by the patient. All components of Universal Protocol/PAUSE Rule completed. Consent 2/Introductory Paragraph: Mohs surgery was explained to the patient and consent was obtained. The risks, benefits and alternatives to therapy were discussed in detail. Specifically, the risks of infection, scarring, bleeding, prolonged wound healing, incomplete removal, allergy to anesthesia, nerve injury and recurrence were addressed. Prior to the procedure, the treatment site was clearly identified and confirmed by the patient. All components of Universal Protocol/PAUSE Rule completed. Consent 3/Introductory Paragraph: I gave the patient a chance to ask questions they had about the procedure.  Following this I explained the Mohs procedure and consent was obtained. The risks, benefits and alternatives to therapy were discussed in detail. Specifically, the risks of infection, scarring, bleeding, prolonged wound healing, incomplete removal, allergy to anesthesia, nerve injury and recurrence were addressed. Prior to the procedure, the treatment site was clearly identified and confirmed by the patient. All components of Universal Protocol/PAUSE Rule completed. Consent (Temporal Branch)/Introductory Paragraph: The rationale for Mohs was explained to the patient and consent was obtained. The risks, benefits and alternatives to therapy were discussed in detail. Specifically, the risks of damage to the temporal branch of the facial nerve, infection, scarring, bleeding, prolonged wound healing, incomplete removal, allergy to anesthesia, and recurrence were addressed. Prior to the procedure, the treatment site was clearly identified and confirmed by the patient. All components of Universal Protocol/PAUSE Rule completed. Consent (Marginal Mandibular)/Introductory Paragraph: The rationale for Mohs was explained to the patient and consent was obtained. The risks, benefits and alternatives to therapy were discussed in detail. Specifically, the risks of damage to the marginal mandibular branch of the facial nerve, infection, scarring, bleeding, prolonged wound healing, incomplete removal, allergy to anesthesia, and recurrence were addressed. Prior to the procedure, the treatment site was clearly identified and confirmed by the patient. All components of Universal Protocol/PAUSE Rule completed. Consent (Spinal Accessory)/Introductory Paragraph: The rationale for Mohs was explained to the patient and consent was obtained. The risks, benefits and alternatives to therapy were discussed in detail. Specifically, the risks of damage to the spinal accessory nerve, infection, scarring, bleeding, prolonged wound healing, incomplete removal, allergy to anesthesia, and recurrence were addressed. Prior to the procedure, the treatment site was clearly identified and confirmed by the patient. All components of Universal Protocol/PAUSE Rule completed. Consent (Near Eyelid Margin)/Introductory Paragraph: The rationale for Mohs was explained to the patient and consent was obtained. The risks, benefits and alternatives to therapy were discussed in detail. Specifically, the risks of ectropion or eyelid deformity, infection, scarring, bleeding, prolonged wound healing, incomplete removal, allergy to anesthesia, nerve injury and recurrence were addressed. Prior to the procedure, the treatment site was clearly identified and confirmed by the patient. All components of Universal Protocol/PAUSE Rule completed. Consent (Ear)/Introductory Paragraph: The rationale for Mohs was explained to the patient and consent was obtained. The risks, benefits and alternatives to therapy were discussed in detail. Specifically, the risks of ear deformity, infection, scarring, bleeding, prolonged wound healing, incomplete removal, allergy to anesthesia, nerve injury and recurrence were addressed. Prior to the procedure, the treatment site was clearly identified and confirmed by the patient. All components of Universal Protocol/PAUSE Rule completed. Consent (Nose)/Introductory Paragraph: The rationale for Mohs was explained to the patient and consent was obtained. The risks, benefits and alternatives to therapy were discussed in detail. Specifically, the risks of nasal deformity, changes in the flow of air through the nose, infection, scarring, bleeding, prolonged wound healing, incomplete removal, allergy to anesthesia, nerve injury and recurrence were addressed. Prior to the procedure, the treatment site was clearly identified and confirmed by the patient. All components of Universal Protocol/PAUSE Rule completed. Consent (Lip)/Introductory Paragraph: The rationale for Mohs was explained to the patient and consent was obtained. The risks, benefits and alternatives to therapy were discussed in detail. Specifically, the risks of lip deformity, changes in the oral aperture, infection, scarring, bleeding, prolonged wound healing, incomplete removal, allergy to anesthesia, nerve injury and recurrence were addressed. Prior to the procedure, the treatment site was clearly identified and confirmed by the patient. All components of Universal Protocol/PAUSE Rule completed. Consent (Scalp)/Introductory Paragraph: The rationale for Mohs was explained to the patient and consent was obtained. The risks, benefits and alternatives to therapy were discussed in detail. Specifically, the risks of changes in hair growth pattern secondary to repair, infection, scarring, bleeding, prolonged wound healing, incomplete removal, allergy to anesthesia, nerve injury and recurrence were addressed. Prior to the procedure, the treatment site was clearly identified and confirmed by the patient. All components of Universal Protocol/PAUSE Rule completed. Detail Level: Detailed Postop Diagnosis: same Surgeon: Ron Anesthesia Override: +Polocaine 1% Anesthesia Volume In Cc: 3 Additional Anesthesia Volume In Cc: 6 Hemostasis: Electrocautery Estimated Blood Loss (Cc): minimal Repair Anesthesia Method: local infiltration Brow Lift Text: A midfrontal incision was made medially to the defect to allow access to the tissues just superior to the left eyebrow. Following careful dissection inferiorly in a supraperiosteal plane to the level of the left eyebrow, several 3-0 monocryl sutures were used to resuspend the eyebrow orbicularis oculi muscular unit to the superior frontal bone periosteum. This resulted in an appropriate reapproximation of static eyebrow symmetry and correction of the left brow ptosis. Deep Sutures: 4-0 Monocryl Epidermal Sutures: 5-0 Fast Absorbing Gut Epidermal Closure: simple interrupted Suturegard Intro: Intraoperative tissue expansion was performed, utilizing the SUTUREGARD device, in order to reduce wound tension. Suturegard Body: The suture ends were repeatedly re-tightened and re-clamped to achieve the desired tissue expansion. Hemigard Intro: Due to skin fragility and wound tension, it was decided to use HEMIGARD adhesive retention suture devices to permit a linear closure. The skin was cleaned and dried for a 6cm distance away from the wound. Excessive hair, if present, was removed to allow for adhesion. Hemigard Postcare Instructions: The HEMIGARD strips are to remain completely dry for at least 5-7 days. Donor Site Anesthesia Type: same as repair anesthesia Graft Donor Site Bandage (Optional-Leave Blank If You Don't Want In Note): Steri-strips and a pressure bandage were applied to the donor site. Closure 2 Information: This tab is for additional flaps and grafts, including complex repair and grafts and complex repair and flaps. You can also specify a different location for the additional defect, if the location is the same you do not need to select a new one. We will insert the automated text for the repair you select below just as we do for solitary flaps and grafts. Please note that at this time if you select a location with a different insurance zone you will need to override the ICD10 and CPT if appropriate. Closure 3 Information: This tab is for additional flaps and grafts above and beyond our usual structured repairs.  Please note if you enter information here it will not currently bill and you will need to add the billing information manually. Wound Care: Petrolatum Dressing: pressure dressing Dressing (No Sutures): dry sterile dressing Unna Boot Text: An Unna boot was placed to help immobilize the limb and facilitate more rapid healing. Home Suture Removal Text: Patient was provided instructions on removing sutures and will remove their sutures at home.  If they have any questions or difficulties they will call the office. Post-Care Instructions: I reviewed with the patient in detail post-care instructions. Patient is not to engage in any heavy lifting, exercise, or swimming for the next 14 days. Should the patient develop any fevers, chills, bleeding, severe pain patient will contact the office immediately. Pain Refusal Text: I offered to prescribe pain medication but the patient refused to take this medication. Mauc Instructions: By selecting yes to the question below the MAUC number will be added into the note.  This will be calculated automatically based on the diagnosis chosen, the size entered, the body zone selected (H,M,L) and the specific indications you chose. You will also have the option to override the Mohs AUC if you disagree with the automatically calculated number and this option is found in the Case Summary tab. Where Do You Want The Question To Include Opioid Counseling Located?: Case Summary Tab Eye Protection Verbiage: Before proceeding with the stage, a plastic scleral shield was inserted. The globe was anesthetized with a few drops of 1% lidocaine with 1:100,000 epinephrine. Then, an appropriate sized scleral shield was chosen and coated with lacrilube ointment. The shield was gently inserted and left in place for the duration of each stage. After the stage was completed, the shield was gently removed. Mohs Method Verbiage: An incision at a 45 degree angle following the standard Mohs approach was done and the specimen was harvested as a microscopic controlled layer. Surgeon/Pathologist Verbiage (Will Incorporate Name Of Surgeon From Intro If Not Blank): operated in two distinct and integrated capacities as the surgeon and pathologist. Mohs Histo Method Verbiage: Each section was then chromacoded and processed in the Mohs lab using the Mohs protocol and submitted for frozen section. Subsequent Stages Histo Method Verbiage: Using a similar technique to that described above, a thin layer of tissue was removed from all areas where tumor was visible on the previous stage.  The tissue was again oriented, mapped, dyed, and processed as above. Mohs Rapid Report Verbiage: The area of clinically evident tumor was marked with skin marking ink and appropriately hatched.  The initial incision was made following the Mohs approach through the skin.  The specimen was taken to the lab, divided into the necessary number of pieces, chromacoded and processed according to the Mohs protocol.  This was repeated in successive stages until a tumor free defect was achieved. Complex Repair Preamble Text (Leave Blank If You Do Not Want): Extensive wide undermining was performed. Intermediate Repair Preamble Text (Leave Blank If You Do Not Want): Undermining was performed with blunt dissection. Non-Graft Cartilage Fenestration Text: The cartilage was fenestrated with a 2mm punch biopsy to help facilitate healing. Graft Cartilage Fenestration Text: The cartilage was fenestrated with a 2mm punch biopsy to help facilitate graft survival and healing. Secondary Intention Text (Leave Blank If You Do Not Want): The defect will heal with secondary intention. No Repair - Repaired With Adjacent Surgical Defect Text (Leave Blank If You Do Not Want): After obtaining clear surgical margins the defect was repaired concurrently with another surgical defect which was in close approximation. Adjacent Tissue Transfer Text: The defect edges were debeveled with a #15 scalpel blade.  Given the location of the defect and the proximity to free margins an adjacent tissue transfer was deemed most appropriate.  Using a sterile surgical marker, an appropriate flap was drawn incorporating the defect and placing the expected incisions within the relaxed skin tension lines where possible.    The area thus outlined was incised deep to adipose tissue with a #15 scalpel blade.  The skin margins were undermined to an appropriate distance in all directions utilizing iris scissors. Adjacent tissue was incised and carried over to close the defect. Advancement Flap (Single) Text: The defect edges were debeveled with a #15 scalpel blade.  Given the location of the defect and the proximity to free margins a single advancement flap was deemed most appropriate.  Using a sterile surgical marker, an appropriate advancement flap was drawn incorporating the defect and placing the expected incisions within the relaxed skin tension lines where possible.    The area thus outlined was incised deep to adipose tissue with a #15 scalpel blade.  The skin margins were undermined to an appropriate distance in all directions utilizing iris scissors. Adjacent tissue was incised and carried over to close the defect. Advancement Flap (Double) Text: The defect edges were debeveled with a #15 scalpel blade.  Given the location of the defect and the proximity to free margins a double advancement flap was deemed most appropriate.  Using a sterile surgical marker, the appropriate advancement flaps were drawn incorporating the defect and placing the expected incisions within the relaxed skin tension lines where possible.    The area thus outlined was incised deep to adipose tissue with a #15 scalpel blade.  The skin margins were undermined to an appropriate distance in all directions utilizing iris scissors. Adjacent tissue was incised and carried over to close the defect. Burow's Advancement Flap Text: The defect edges were debeveled with a #15 scalpel blade.  Given the location of the defect and the proximity to free margins a Burow's advancement flap was deemed most appropriate.  Using a sterile surgical marker, the appropriate advancement flap was drawn incorporating the defect and placing the expected incisions within the relaxed skin tension lines where possible.    The area thus outlined was incised deep to adipose tissue with a #15 scalpel blade.  The skin margins were undermined to an appropriate distance in all directions utilizing iris scissors. Adjacent tissue was incised and carried over to close the defect. Chonodrocutaneous Helical Advancement Flap Text: The defect edges were debeveled with a #15 scalpel blade.  Given the location of the defect and the proximity to free margins a chondrocutaneous helical advancement flap was deemed most appropriate.  Using a sterile surgical marker, the appropriate advancement flap was drawn incorporating the defect and placing the expected incisions within the relaxed skin tension lines where possible.    The area thus outlined was incised deep to adipose tissue with a #15 scalpel blade.  The skin margins were undermined to an appropriate distance in all directions utilizing iris scissors. Crescentic Advancement Flap Text: The defect edges were debeveled with a #15 scalpel blade.  Given the location of the defect and the proximity to free margins a crescentic advancement flap was deemed most appropriate.  Using a sterile surgical marker, the appropriate advancement flap was drawn incorporating the defect and placing the expected incisions within the relaxed skin tension lines where possible.    The area thus outlined was incised deep to adipose tissue with a #15 scalpel blade.  The skin margins were undermined to an appropriate distance in all directions utilizing iris scissors. A-T Advancement Flap Text: The defect edges were debeveled with a #15 scalpel blade.  Given the location of the defect, shape of the defect and the proximity to free margins an A-T advancement flap was deemed most appropriate.  Using a sterile surgical marker, an appropriate advancement flap was drawn incorporating the defect and placing the expected incisions within the relaxed skin tension lines where possible.    The area thus outlined was incised deep to adipose tissue with a #15 scalpel blade.  The skin margins were undermined to an appropriate distance in all directions utilizing iris scissors. O-T Advancement Flap Text: The defect edges were debeveled with a #15 scalpel blade.  Given the location of the defect, shape of the defect and the proximity to free margins an O-T advancement flap was deemed most appropriate.  Using a sterile surgical marker, an appropriate advancement flap was drawn incorporating the defect and placing the expected incisions within the relaxed skin tension lines where possible.    The area thus outlined was incised deep to adipose tissue with a #15 scalpel blade.  The skin margins were undermined to an appropriate distance in all directions utilizing iris scissors. O-L Flap Text: The defect edges were debeveled with a #15 scalpel blade.  Given the location of the defect, shape of the defect and the proximity to free margins an O-L flap was deemed most appropriate.  Using a sterile surgical marker, an appropriate advancement flap was drawn incorporating the defect and placing the expected incisions within the relaxed skin tension lines where possible.    The area thus outlined was incised deep to adipose tissue with a #15 scalpel blade.  The skin margins were undermined to an appropriate distance in all directions utilizing iris scissors. O-Z Flap Text: The defect edges were debeveled with a #15 scalpel blade.  Given the location of the defect, shape of the defect and the proximity to free margins an O-Z flap was deemed most appropriate.  Using a sterile surgical marker, an appropriate transposition flap was drawn incorporating the defect and placing the expected incisions within the relaxed skin tension lines where possible. The area thus outlined was incised deep to adipose tissue with a #15 scalpel blade.  The skin margins were undermined to an appropriate distance in all directions utilizing iris scissors. Double O-Z Flap Text: The defect edges were debeveled with a #15 scalpel blade.  Given the location of the defect, shape of the defect and the proximity to free margins a Double O-Z flap was deemed most appropriate.  Using a sterile surgical marker, an appropriate transposition flap was drawn incorporating the defect and placing the expected incisions within the relaxed skin tension lines where possible. The area thus outlined was incised deep to adipose tissue with a #15 scalpel blade.  The skin margins were undermined to an appropriate distance in all directions utilizing iris scissors. V-Y Flap Text: The defect edges were debeveled with a #15 scalpel blade.  Given the location of the defect, shape of the defect and the proximity to free margins a V-Y flap was deemed most appropriate.  Using a sterile surgical marker, an appropriate advancement flap was drawn incorporating the defect and placing the expected incisions within the relaxed skin tension lines where possible.    The area thus outlined was incised deep to adipose tissue with a #15 scalpel blade.  The skin margins were undermined to an appropriate distance in all directions utilizing iris scissors. Advancement-Rotation Flap Text: The defect edges were debeveled with a #15 scalpel blade.  Given the location of the defect, shape of the defect and the proximity to free margins an advancement-rotation flap was deemed most appropriate.  Using a sterile surgical marker, an appropriate flap was drawn incorporating the defect and placing the expected incisions within the relaxed skin tension lines where possible. The area thus outlined was incised deep to adipose tissue with a #15 scalpel blade.  The skin margins were undermined to an appropriate distance in all directions utilizing iris scissors. Mercedes Flap Text: The defect edges were debeveled with a #15 scalpel blade.  Given the location of the defect, shape of the defect and the proximity to free margins a Mercedes flap was deemed most appropriate.  Using a sterile surgical marker, an appropriate advancement flap was drawn incorporating the defect and placing the expected incisions within the relaxed skin tension lines where possible. The area thus outlined was incised deep to adipose tissue with a #15 scalpel blade.  The skin margins were undermined to an appropriate distance in all directions utilizing iris scissors. Modified Advancement Flap Text: The defect edges were debeveled with a #15 scalpel blade.  Given the location of the defect, shape of the defect and the proximity to free margins a modified advancement flap was deemed most appropriate.  Using a sterile surgical marker, an appropriate advancement flap was drawn incorporating the defect and placing the expected incisions within the relaxed skin tension lines where possible.    The area thus outlined was incised deep to adipose tissue with a #15 scalpel blade.  The skin margins were undermined to an appropriate distance in all directions utilizing iris scissors. Mucosal Advancement Flap Text: Given the location of the defect, shape of the defect and the proximity to free margins a mucosal advancement flap was deemed most appropriate. Incisions were made with a 15 blade scalpel in the appropriate fashion along the cutaneous vermilion border and the mucosal lip. The remaining actinically damaged mucosal tissue was excised.  The mucosal advancement flap was then elevated to the gingival sulcus with care taken to preserve the neurovascular structures and advanced into the primary defect. Care was taken to ensure that precise realignment of the vermilion border was achieved. Adjacent tissue was incised and carried over to close the defect. Peng Advancement Flap Text: The defect edges were debeveled with a #15 scalpel blade.  Given the location of the defect, shape of the defect and the proximity to free margins a Peng advancement flap was deemed most appropriate.  Using a sterile surgical marker, an appropriate advancement flap was drawn incorporating the defect and placing the expected incisions within the relaxed skin tension lines where possible. The area thus outlined was incised deep to adipose tissue with a #15 scalpel blade.  The skin margins were undermined to an appropriate distance in all directions utilizing iris scissors. Hatchet Flap Text: The defect edges were debeveled with a #15 scalpel blade.  Given the location of the defect, shape of the defect and the proximity to free margins a hatchet flap was deemed most appropriate.  Using a sterile surgical marker, an appropriate hatchet flap was drawn incorporating the defect and placing the expected incisions within the relaxed skin tension lines where possible.    The area thus outlined was incised deep to adipose tissue with a #15 scalpel blade.  The skin margins were undermined to an appropriate distance in all directions utilizing iris scissors. Rotation Flap Text: The defect edges were debeveled with a #15 scalpel blade.  Given the location of the defect, shape of the defect and the proximity to free margins a rotation flap was deemed most appropriate.  Using a sterile surgical marker, an appropriate rotation flap was drawn incorporating the defect and placing the expected incisions within the relaxed skin tension lines where possible.    The area thus outlined was incised deep to adipose tissue with a #15 scalpel blade.  The skin margins were undermined to an appropriate distance in all directions utilizing iris scissors. Adjacent tissue was incised and carried over to close the defect. Bilateral Rotation Flap Text: The defect edges were debeveled with a #15 scalpel blade. Given the location of the defect, shape of the defect and the proximity to free margins a bilateral rotation flap was deemed most appropriate. Using a sterile surgical marker, an appropriate rotation flap was drawn incorporating the defect and placing the expected incisions within the relaxed skin tension lines where possible. The area thus outlined was incised deep to adipose tissue with a #15 scalpel blade. The skin margins were undermined to an appropriate distance in all directions utilizing iris scissors. Following this, the designed flap was carried over into the primary defect and sutured into place. Spiral Flap Text: The defect edges were debeveled with a #15 scalpel blade.  Given the location of the defect, shape of the defect and the proximity to free margins a spiral flap was deemed most appropriate.  Using a sterile surgical marker, an appropriate rotation flap was drawn incorporating the defect and placing the expected incisions within the relaxed skin tension lines where possible. The area thus outlined was incised deep to adipose tissue with a #15 scalpel blade.  The skin margins were undermined to an appropriate distance in all directions utilizing iris scissors. Staged Advancement Flap Text: The defect edges were debeveled with a #15 scalpel blade.  Given the location of the defect, shape of the defect and the proximity to free margins a staged advancement flap was deemed most appropriate.  Using a sterile surgical marker, an appropriate advancement flap was drawn incorporating the defect and placing the expected incisions within the relaxed skin tension lines where possible. The area thus outlined was incised deep to adipose tissue with a #15 scalpel blade.  The skin margins were undermined to an appropriate distance in all directions utilizing iris scissors. Star Wedge Flap Text: The defect edges were debeveled with a #15 scalpel blade.  Given the location of the defect, shape of the defect and the proximity to free margins a star wedge flap was deemed most appropriate.  Using a sterile surgical marker, an appropriate rotation flap was drawn incorporating the defect and placing the expected incisions within the relaxed skin tension lines where possible. The area thus outlined was incised deep to adipose tissue with a #15 scalpel blade.  The skin margins were undermined to an appropriate distance in all directions utilizing iris scissors. Transposition Flap Text: The defect edges were debeveled with a #15 scalpel blade.  Given the location of the defect and the proximity to free margins a transposition flap was deemed most appropriate.  Using a sterile surgical marker, an appropriate transposition flap was drawn incorporating the defect.    The area thus outlined was incised deep to adipose tissue with a #15 scalpel blade.  The skin margins were undermined to an appropriate distance in all directions utilizing iris scissors. Adjacent tissue was incised and carried over to close the defect. Muscle Hinge Flap Text: The defect edges were debeveled with a #15 scalpel blade.  Given the size, depth and location of the defect and the proximity to free margins a muscle hinge flap was deemed most appropriate.  Using a sterile surgical marker, an appropriate hinge flap was drawn incorporating the defect. The area thus outlined was incised with a #15 scalpel blade.  The skin margins were undermined to an appropriate distance in all directions utilizing iris scissors. Mustarde Flap Text: The defect edges were debeveled with a #15 scalpel blade.  Given the size, depth and location of the defect and the proximity to free margins a Mustarde flap was deemed most appropriate.  Using a sterile surgical marker, an appropriate flap was drawn incorporating the defect. The area thus outlined was incised with a #15 scalpel blade.  The skin margins were undermined to an appropriate distance in all directions utilizing iris scissors. Nasal Turnover Hinge Flap Text: The defect edges were debeveled with a #15 scalpel blade.  Given the size, depth, location of the defect and the defect being full thickness a nasal turnover hinge flap was deemed most appropriate.  Using a sterile surgical marker, an appropriate hinge flap was drawn incorporating the defect. The area thus outlined was incised with a #15 scalpel blade. The flap was designed to recreate the nasal mucosal lining and the alar rim. The skin margins were undermined to an appropriate distance in all directions utilizing iris scissors. Nasalis-Muscle-Based Myocutaneous Island Pedicle Flap Text: Using a #15 blade, an incision was made around the donor flap to the level of the nasalis muscle. Wide lateral undermining was then performed in both the subcutaneous plane above the nasalis muscle, and in a submuscular plane just above periosteum. This allowed the formation of a free nasalis muscle axial pedicle (based on the angular artery) which was still attached to the actual cutaneous flap, increasing its mobility and vascular viability. Hemostasis was obtained with pinpoint electrocoagulation. The flap was mobilized into position and the pivotal anchor points positioned and stabilized with buried interrupted sutures. Subcutaneous and dermal tissues were closed in a multilayered fashion with sutures. Tissue redundancies were excised, and the epidermal edges were apposed without significant tension and sutured with sutures. Nasalis Myocutaneous Flap Text: Using a #15 blade, an incision was made around the donor flap to the level of the nasalis muscle. Wide lateral undermining was then performed in both the subcutaneous plane above the nasalis muscle, and in a submuscular plane just above periosteum. This allowed the formation of a free nasalis muscle axial pedicle which was still attached to the actual cutaneous flap, increasing its mobility and vascular viability. Hemostasis was obtained with pinpoint electrocoagulation. The flap was mobilized into position and the pivotal anchor points positioned and stabilized with buried interrupted sutures. Subcutaneous and dermal tissues were closed in a multilayered fashion with sutures. Tissue redundancies were excised, and the epidermal edges were apposed without significant tension and sutured with sutures. Nasolabial Transposition Flap Text: The defect edges were debeveled with a #15 scalpel blade.  Given the size, depth and location of the defect and the proximity to free margins a nasolabial transposition flap was deemed most appropriate. Using a sterile surgical marker, an appropriate flap was drawn incorporating the defect. The area thus outlined was incised with a #15 scalpel blade. The skin margins were undermined to an appropriate distance in all directions utilizing iris scissors. Following this, the designed flap was carried into the primary defect and sutured into place. Orbicularis Oris Muscle Flap Text: The defect edges were debeveled with a #15 scalpel blade.  Given that the defect affected the competency of the oral sphincter an orbicularis oris muscle flap was deemed most appropriate to restore this competency and normal muscle function.  Using a sterile surgical marker, an appropriate flap was drawn incorporating the defect. The area thus outlined was incised with a #15 scalpel blade. Melolabial Transposition Flap Text: The defect edges were debeveled with a #15 scalpel blade.  Given the location of the defect and the proximity to free margins a melolabial flap was deemed most appropriate.  Using a sterile surgical marker, an appropriate melolabial transposition flap was drawn incorporating the defect.    The area thus outlined was incised deep to adipose tissue with a #15 scalpel blade.  The skin margins were undermined to an appropriate distance in all directions utilizing iris scissors. Rectangular Flap Text: The defect edges were debeveled with a #15 scalpel blade. Given the location of the defect and the proximity to free margins a rectangular flap was deemed most appropriate. Using a sterile surgical marker, an appropriate rectangular flap was drawn incorporating the defect. The area thus outlined was incised deep to adipose tissue with a #15 scalpel blade. The skin margins were undermined to an appropriate distance in all directions utilizing iris scissors. Following this, the designed flap was carried over into the primary defect and sutured into place. Rhombic Flap Text: The defect edges were debeveled with a #15 scalpel blade.  Given the location of the defect and the proximity to free margins a rhombic flap was deemed most appropriate.  Using a sterile surgical marker, an appropriate rhombic flap was drawn incorporating the defect.    The area thus outlined was incised deep to adipose tissue with a #15 scalpel blade.  The skin margins were undermined to an appropriate distance in all directions utilizing iris scissors. Adjacent tissue was incised and carried over to close the defect. Rhomboid Transposition Flap Text: The defect edges were debeveled with a #15 scalpel blade.  Given the location of the defect and the proximity to free margins a rhomboid transposition flap was deemed most appropriate.  Using a sterile surgical marker, an appropriate rhomboid flap was drawn incorporating the defect.    The area thus outlined was incised deep to adipose tissue with a #15 scalpel blade.  The skin margins were undermined to an appropriate distance in all directions utilizing iris scissors. Adjacent tissue was incised and carried over to close the defect. Bi-Rhombic Flap Text: The defect edges were debeveled with a #15 scalpel blade.  Given the location of the defect and the proximity to free margins a bi-rhombic flap was deemed most appropriate.  Using a sterile surgical marker, an appropriate rhombic flap was drawn incorporating the defect. The area thus outlined was incised deep to adipose tissue with a #15 scalpel blade.  The skin margins were undermined to an appropriate distance in all directions utilizing iris scissors. Helical Rim Advancement Flap Text: The defect edges were debeveled with a #15 blade scalpel.  Given the location of the defect and the proximity to free margins (helical rim) a double helical rim advancement flap was deemed most appropriate.  Using a sterile surgical marker, the appropriate advancement flaps were drawn incorporating the defect and placing the expected incisions between the helical rim and antihelix where possible.  The area thus outlined was incised through and through with a #15 scalpel blade.  With a skin hook and iris scissors, the flaps were gently and sharply undermined and freed up. Bilateral Helical Rim Advancement Flap Text: The defect edges were debeveled with a #15 blade scalpel.  Given the location of the defect and the proximity to free margins (helical rim) a bilateral helical rim advancement flap was deemed most appropriate.  Using a sterile surgical marker, the appropriate advancement flaps were drawn incorporating the defect and placing the expected incisions between the helical rim and antihelix where possible.  The area thus outlined was incised through and through with a #15 scalpel blade.  With a skin hook and iris scissors, the flaps were gently and sharply undermined and freed up. Ear Star Wedge Flap Text: The defect edges were debeveled with a #15 blade scalpel.  Given the location of the defect and the proximity to free margins (helical rim) an ear star wedge flap was deemed most appropriate.  Using a sterile surgical marker, the appropriate flap was drawn incorporating the defect and placing the expected incisions between the helical rim and antihelix where possible.  The area thus outlined was incised through and through with a #15 scalpel blade. Banner Transposition Flap Text: The defect edges were debeveled with a #15 scalpel blade.  Given the location of the defect and the proximity to free margins a Banner transposition flap was deemed most appropriate.  Using a sterile surgical marker, an appropriate flap drawn around the defect. The area thus outlined was incised deep to adipose tissue with a #15 scalpel blade.  The skin margins were undermined to an appropriate distance in all directions utilizing iris scissors. Adjacent tissue was incised and carried over to close the defect. Bilobed Flap Text: The defect edges were debeveled with a #15 scalpel blade.  Given the location of the defect and the proximity to free margins a bilobe flap was deemed most appropriate.  Using a sterile surgical marker, an appropriate bilobe flap drawn around the defect.    The area thus outlined was incised deep to adipose tissue with a #15 scalpel blade.  The skin margins were undermined to an appropriate distance in all directions utilizing iris scissors. Bilobed Transposition Flap Text: The defect edges were debeveled with a #15 scalpel blade.  Given the location of the defect and the proximity to free margins a bilobed transposition flap was deemed most appropriate.  Using a sterile surgical marker, an appropriate bilobe flap drawn around the defect.    The area thus outlined was incised deep to adipose tissue with a #15 scalpel blade.  The skin margins were undermined to an appropriate distance in all directions utilizing iris scissors. Trilobed Flap Text: The defect edges were debeveled with a #15 scalpel blade.  Given the location of the defect and the proximity to free margins a trilobed flap was deemed most appropriate.  Using a sterile surgical marker, an appropriate trilobed flap drawn around the defect.    The area thus outlined was incised deep to adipose tissue with a #15 scalpel blade.  The skin margins were undermined to an appropriate distance in all directions utilizing iris scissors. Dorsal Nasal Flap Text: The defect edges were debeveled with a #15 scalpel blade.  Given the location of the defect and the proximity to free margins a dorsal nasal flap was deemed most appropriate.  Using a sterile surgical marker, an appropriate dorsal nasal flap was drawn around the defect.    The area thus outlined was incised deep to adipose tissue with a #15 scalpel blade.  The skin margins were undermined to an appropriate distance in all directions utilizing iris scissors. Island Pedicle Flap Text: The defect edges were debeveled with a #15 scalpel blade.  Given the location of the defect, shape of the defect and the proximity to free margins an island pedicle advancement flap was deemed most appropriate.  Using a sterile surgical marker, an appropriate advancement flap was drawn incorporating the defect, outlining the appropriate donor tissue and placing the expected incisions within the relaxed skin tension lines where possible.    The area thus outlined was incised deep to adipose tissue with a #15 scalpel blade.  The skin margins were undermined to an appropriate distance in all directions around the primary defect and laterally outward around the island pedicle utilizing iris scissors.  There was minimal undermining beneath the pedicle flap. Adjacent tissue was incised and carried over to close the defect. Island Pedicle Flap With Canthal Suspension Text: The defect edges were debeveled with a #15 scalpel blade.  Given the location of the defect, shape of the defect and the proximity to free margins an island pedicle advancement flap was deemed most appropriate.  Using a sterile surgical marker, an appropriate advancement flap was drawn incorporating the defect, outlining the appropriate donor tissue and placing the expected incisions within the relaxed skin tension lines where possible. The area thus outlined was incised deep to adipose tissue with a #15 scalpel blade.  The skin margins were undermined to an appropriate distance in all directions around the primary defect and laterally outward around the island pedicle utilizing iris scissors.  There was minimal undermining beneath the pedicle flap. A suspension suture was placed in the canthal tendon to prevent tension and prevent ectropion. Alar Island Pedicle Flap Text: The defect edges were debeveled with a #15 scalpel blade.  Given the location of the defect, shape of the defect and the proximity to the alar rim an island pedicle advancement flap was deemed most appropriate.  Using a sterile surgical marker, an appropriate advancement flap was drawn incorporating the defect, outlining the appropriate donor tissue and placing the expected incisions within the nasal ala running parallel to the alar rim. The area thus outlined was incised with a #15 scalpel blade.  The skin margins were undermined minimally to an appropriate distance in all directions around the primary defect and laterally outward around the island pedicle utilizing iris scissors.  There was minimal undermining beneath the pedicle flap. Double Island Pedicle Flap Text: The defect edges were debeveled with a #15 scalpel blade.  Given the location of the defect, shape of the defect and the proximity to free margins a double island pedicle advancement flap was deemed most appropriate.  Using a sterile surgical marker, an appropriate advancement flap was drawn incorporating the defect, outlining the appropriate donor tissue and placing the expected incisions within the relaxed skin tension lines where possible.    The area thus outlined was incised deep to adipose tissue with a #15 scalpel blade.  The skin margins were undermined to an appropriate distance in all directions around the primary defect and laterally outward around the island pedicle utilizing iris scissors.  There was minimal undermining beneath the pedicle flap. Island Pedicle Flap-Requiring Vessel Identification Text: The defect edges were debeveled with a #15 scalpel blade.  Given the location of the defect, shape of the defect and the proximity to free margins an island pedicle advancement flap was deemed most appropriate.  Using a sterile surgical marker, an appropriate advancement flap was drawn, based on the axial vessel mentioned above, incorporating the defect, outlining the appropriate donor tissue and placing the expected incisions within the relaxed skin tension lines where possible.    The area thus outlined was incised deep to adipose tissue with a #15 scalpel blade.  The skin margins were undermined to an appropriate distance in all directions around the primary defect and laterally outward around the island pedicle utilizing iris scissors.  There was minimal undermining beneath the pedicle flap. Keystone Flap Text: The defect edges were debeveled with a #15 scalpel blade.  Given the location of the defect, shape of the defect a keystone flap was deemed most appropriate.  Using a sterile surgical marker, an appropriate keystone flap was drawn incorporating the defect, outlining the appropriate donor tissue and placing the expected incisions within the relaxed skin tension lines where possible. The area thus outlined was incised deep to adipose tissue with a #15 scalpel blade.  The skin margins were undermined to an appropriate distance in all directions around the primary defect and laterally outward around the flap utilizing iris scissors. O-T Plasty Text: The defect edges were debeveled with a #15 scalpel blade.  Given the location of the defect, shape of the defect and the proximity to free margins an O-T plasty was deemed most appropriate.  Using a sterile surgical marker, an appropriate O-T plasty was drawn incorporating the defect and placing the expected incisions within the relaxed skin tension lines where possible.    The area thus outlined was incised deep to adipose tissue with a #15 scalpel blade.  The skin margins were undermined to an appropriate distance in all directions utilizing iris scissors. O-Z Plasty Text: The defect edges were debeveled with a #15 scalpel blade.  Given the location of the defect, shape of the defect and the proximity to free margins an O-Z plasty (double transposition flap) was deemed most appropriate.  Using a sterile surgical marker, the appropriate transposition flaps were drawn incorporating the defect and placing the expected incisions within the relaxed skin tension lines where possible.    The area thus outlined was incised deep to adipose tissue with a #15 scalpel blade.  The skin margins were undermined to an appropriate distance in all directions utilizing iris scissors.  Hemostasis was achieved with electrocautery.  The flaps were then transposed into place, one clockwise and the other counterclockwise, and anchored with interrupted buried subcutaneous sutures. Double O-Z Plasty Text: The defect edges were debeveled with a #15 scalpel blade.  Given the location of the defect, shape of the defect and the proximity to free margins a Double O-Z plasty (double transposition flap) was deemed most appropriate.  Using a sterile surgical marker, the appropriate transposition flaps were drawn incorporating the defect and placing the expected incisions within the relaxed skin tension lines where possible. The area thus outlined was incised deep to adipose tissue with a #15 scalpel blade.  The skin margins were undermined to an appropriate distance in all directions utilizing iris scissors.  Hemostasis was achieved with electrocautery.  The flaps were then transposed into place, one clockwise and the other counterclockwise, and anchored with interrupted buried subcutaneous sutures. V-Y Plasty Text: The defect edges were debeveled with a #15 scalpel blade.  Given the location of the defect, shape of the defect and the proximity to free margins an V-Y advancement flap was deemed most appropriate.  Using a sterile surgical marker, an appropriate advancement flap was drawn incorporating the defect and placing the expected incisions within the relaxed skin tension lines where possible.    The area thus outlined was incised deep to adipose tissue with a #15 scalpel blade.  The skin margins were undermined to an appropriate distance in all directions utilizing iris scissors. H Plasty Text: Given the location of the defect, shape of the defect and the proximity to free margins a H-plasty was deemed most appropriate for repair.  Using a sterile surgical marker, the appropriate advancement arms of the H-plasty were drawn incorporating the defect and placing the expected incisions within the relaxed skin tension lines where possible. The area thus outlined was incised deep to adipose tissue with a #15 scalpel blade. The skin margins were undermined to an appropriate distance in all directions utilizing iris scissors.  The opposing advancement arms were then advanced into place in opposite direction and anchored with interrupted buried subcutaneous sutures. W Plasty Text: The lesion was extirpated to the level of the fat with a #15 scalpel blade.  Given the location of the defect, shape of the defect and the proximity to free margins a W-plasty was deemed most appropriate for repair.  Using a sterile surgical marker, the appropriate transposition arms of the W-plasty were drawn incorporating the defect and placing the expected incisions within the relaxed skin tension lines where possible.    The area thus outlined was incised deep to adipose tissue with a #15 scalpel blade.  The skin margins were undermined to an appropriate distance in all directions utilizing iris scissors.  The opposing transposition arms were then transposed into place in opposite direction and anchored with interrupted buried subcutaneous sutures. Z Plasty Text: The lesion was extirpated to the level of the fat with a #15 scalpel blade.  Given the location of the defect, shape of the defect and the proximity to free margins a Z-plasty was deemed most appropriate for repair.  Using a sterile surgical marker, the appropriate transposition arms of the Z-plasty were drawn incorporating the defect and placing the expected incisions within the relaxed skin tension lines where possible.    The area thus outlined was incised deep to adipose tissue with a #15 scalpel blade.  The skin margins were undermined to an appropriate distance in all directions utilizing iris scissors.  The opposing transposition arms were then transposed into place in opposite direction and anchored with interrupted buried subcutaneous sutures. Double Z Plasty Text: The lesion was extirpated to the level of the fat with a #15 scalpel blade. Given the location of the defect, shape of the defect and the proximity to free margins a double Z-plasty was deemed most appropriate for repair. Using a sterile surgical marker, the appropriate transposition arms of the double Z-plasty were drawn incorporating the defect and placing the expected incisions within the relaxed skin tension lines where possible. The area thus outlined was incised deep to adipose tissue with a #15 scalpel blade. The skin margins were undermined to an appropriate distance in all directions utilizing iris scissors. The opposing transposition arms were then transposed and carried over into place in opposite direction and anchored with interrupted buried subcutaneous sutures. Zygomaticofacial Flap Text: Given the location of the defect, shape of the defect and the proximity to free margins a zygomaticofacial flap was deemed most appropriate for repair.  Using a sterile surgical marker, the appropriate flap was drawn incorporating the defect and placing the expected incisions within the relaxed skin tension lines where possible. The area thus outlined was incised deep to adipose tissue with a #15 scalpel blade with preservation of a vascular pedicle.  The skin margins were undermined to an appropriate distance in all directions utilizing iris scissors.  The flap was then placed into the defect and anchored with interrupted buried subcutaneous sutures. Cheek Interpolation Flap Text: A decision was made to reconstruct the defect utilizing an interpolation axial flap and a staged reconstruction.  A telfa template was made of the defect.  This telfa template was then used to outline the Cheek Interpolation flap.  The donor area for the pedicle flap was then injected with anesthesia.  The flap was excised through the skin and subcutaneous tissue down to the layer of the underlying musculature.  The interpolation flap was carefully excised within this deep plane to maintain its blood supply.  The edges of the donor site were undermined.   The donor site was closed in a primary fashion.  The pedicle was then rotated into position and sutured.  Once the tube was sutured into place, adequate blood supply was confirmed with blanching and refill.  The pedicle was then wrapped with xeroform gauze and dressed appropriately with a telfa and gauze bandage to ensure continued blood supply and protect the attached pedicle. Cheek-To-Nose Interpolation Flap Text: A decision was made to reconstruct the defect utilizing an interpolation axial flap and a staged reconstruction.  A telfa template was made of the defect.  This telfa template was then used to outline the Cheek-To-Nose Interpolation flap.  The donor area for the pedicle flap was then injected with anesthesia.  The flap was excised through the skin and subcutaneous tissue down to the layer of the underlying musculature.  The interpolation flap was carefully excised within this deep plane to maintain its blood supply.  The edges of the donor site were undermined.   The donor site was closed in a primary fashion.  The pedicle was then rotated into position and sutured.  Once the tube was sutured into place, adequate blood supply was confirmed with blanching and refill.  The pedicle was then wrapped with xeroform gauze and dressed appropriately with a telfa and gauze bandage to ensure continued blood supply and protect the attached pedicle. Interpolation Flap Text: A decision was made to reconstruct the defect utilizing an interpolation axial flap and a staged reconstruction.  A telfa template was made of the defect.  This telfa template was then used to outline the interpolation flap.  The donor area for the pedicle flap was then injected with anesthesia.  The flap was excised through the skin and subcutaneous tissue down to the layer of the underlying musculature.  The interpolation flap was carefully excised within this deep plane to maintain its blood supply.  The edges of the donor site were undermined.   The donor site was closed in a primary fashion.  The pedicle was then rotated into position and sutured.  Once the tube was sutured into place, adequate blood supply was confirmed with blanching and refill.  The pedicle was then wrapped with xeroform gauze and dressed appropriately with a telfa and gauze bandage to ensure continued blood supply and protect the attached pedicle. Melolabial Interpolation Flap Text: A decision was made to reconstruct the defect utilizing an interpolation axial flap and a staged reconstruction.  A telfa template was made of the defect.  This telfa template was then used to outline the melolabial interpolation flap.  The donor area for the pedicle flap was then injected with anesthesia.  The flap was excised through the skin and subcutaneous tissue down to the layer of the underlying musculature.  The pedicle flap was carefully excised within this deep plane to maintain its blood supply.  The edges of the donor site were undermined.   The donor site was closed in a primary fashion.  The pedicle was then rotated into position and sutured.  Once the tube was sutured into place, adequate blood supply was confirmed with blanching and refill.  The pedicle was then wrapped with xeroform gauze and dressed appropriately with a telfa and gauze bandage to ensure continued blood supply and protect the attached pedicle. Mastoid Interpolation Flap Text: A decision was made to reconstruct the defect utilizing an interpolation axial flap and a staged reconstruction.  A telfa template was made of the defect.  This telfa template was then used to outline the mastoid interpolation flap.  The donor area for the pedicle flap was then injected with anesthesia.  The flap was excised through the skin and subcutaneous tissue down to the layer of the underlying musculature.  The pedicle flap was carefully excised within this deep plane to maintain its blood supply.  The edges of the donor site were undermined.   The donor site was closed in a primary fashion.  The pedicle was then rotated into position and sutured.  Once the tube was sutured into place, adequate blood supply was confirmed with blanching and refill.  The pedicle was then wrapped with xeroform gauze and dressed appropriately with a telfa and gauze bandage to ensure continued blood supply and protect the attached pedicle. Posterior Auricular Interpolation Flap Text: A decision was made to reconstruct the defect utilizing an interpolation axial flap and a staged reconstruction.  A telfa template was made of the defect.  This telfa template was then used to outline the posterior auricular interpolation flap.  The donor area for the pedicle flap was then injected with anesthesia.  The flap was excised through the skin and subcutaneous tissue down to the layer of the underlying musculature.  The pedicle flap was carefully excised within this deep plane to maintain its blood supply.  The edges of the donor site were undermined.   The donor site was closed in a primary fashion.  The pedicle was then rotated into position and sutured.  Once the tube was sutured into place, adequate blood supply was confirmed with blanching and refill.  The pedicle was then wrapped with xeroform gauze and dressed appropriately with a telfa and gauze bandage to ensure continued blood supply and protect the attached pedicle. Paramedian Forehead Flap Text: A decision was made to reconstruct the defect utilizing an interpolation axial flap and a staged reconstruction.  A telfa template was made of the defect.  This telfa template was then used to outline the paramedian forehead pedicle flap.  The donor area for the pedicle flap was then injected with anesthesia.  The flap was excised through the skin and subcutaneous tissue down to the layer of the underlying musculature.  The pedicle flap was carefully excised within this deep plane to maintain its blood supply.  The edges of the donor site were undermined.   The donor site was closed in a primary fashion.  The pedicle was then rotated into position and sutured.  Once the tube was sutured into place, adequate blood supply was confirmed with blanching and refill.  The pedicle was then wrapped with xeroform gauze and dressed appropriately with a telfa and gauze bandage to ensure continued blood supply and protect the attached pedicle. Abbe Flap (Upper To Lower Lip) Text: The defect of the lower lip was assessed and measured.  Given the location and size of the defect, an Abbe flap was deemed most appropriate.  Using a sterile surgical marker, an appropriate Abbe flap was measured and drawn on the upper lip. Local anesthesia was then infiltrated.  A scalpel was then used to incise the upper lip through and through the skin, vermilion, muscle and mucosa, leaving the flap pedicled on the opposite side.  The flap was then rotated and transferred to the lower lip defect.  The flap was then sutured into place with a three layer technique, closing the orbicularis oris muscle layer with subcutaneous buried sutures, followed by a mucosal layer and an epidermal layer. Abbe Flap (Lower To Upper Lip) Text: The defect of the upper lip was assessed and measured.  Given the location and size of the defect, an Abbe flap was deemed most appropriate.  Using a sterile surgical marker, an appropriate Abbe flap was measured and drawn on the lower lip. Local anesthesia was then infiltrated. A scalpel was then used to incise the upper lip through and through the skin, vermilion, muscle and mucosa, leaving the flap pedicled on the opposite side.  The flap was then rotated and transferred to the lower lip defect.  The flap was then sutured into place with a three layer technique, closing the orbicularis oris muscle layer with subcutaneous buried sutures, followed by a mucosal layer and an epidermal layer. Estlander Flap (Upper To Lower Lip) Text: The defect of the lower lip was assessed and measured.  Given the location and size of the defect, an Estlander flap was deemed most appropriate.  Using a sterile surgical marker, an appropriate Estlander flap was measured and drawn on the upper lip. Local anesthesia was then infiltrated. A scalpel was then used to incise the lateral aspect of the flap, through skin, muscle and mucosa, leaving the flap pedicled medially.  The flap was then rotated and positioned to fill the lower lip defect.  The flap was then sutured into place with a three layer technique, closing the orbicularis oris muscle layer with subcutaneous buried sutures, followed by a mucosal layer and an epidermal layer. Cheiloplasty (Less Than 50%) Text: A decision was made to reconstruct the defect with a  cheiloplasty.  The defect was undermined extensively.  Additional orbicularis oris muscle was excised with a 15 blade scalpel.  The defect was converted into a full thickness wedge, of less than 50% of the vertical height of the lip, to facilite a better cosmetic result.  Small vessels were then tied off with 5-0 monocyrl. The orbicularis oris, superficial fascia, adipose and dermis were then reapproximated.  After the deeper layers were approximated the epidermis was reapproximated with particular care given to realign the vermilion border. Cheiloplasty (Complex) Text: A decision was made to reconstruct the defect with a  cheiloplasty.  The defect was undermined extensively.  Additional orbicularis oris muscle was excised with a 15 blade scalpel.  The defect was converted into a full thickness wedge to facilite a better cosmetic result.  Small vessels were then tied off with 5-0 monocyrl. The orbicularis oris, superficial fascia, adipose and dermis were then reapproximated.  After the deeper layers were approximated the epidermis was reapproximated with particular care given to realign the vermilion border. Ear Wedge Repair Text: A wedge excision was completed by carrying down an excision through the full thickness of the ear and cartilage with an inward facing Burow's triangle. The wound was then closed in a layered fashion. Full Thickness Lip Wedge Repair (Flap) Text: Given the location of the defect and the proximity to free margins a full thickness wedge repair was deemed most appropriate.  Using a sterile surgical marker, the appropriate repair was drawn incorporating the defect and placing the expected incisions perpendicular to the vermilion border.  The vermilion border was also meticulously outlined to ensure appropriate reapproximation during the repair.  The area thus outlined was incised through and through with a #15 scalpel blade.  The muscularis and dermis were reaproximated with deep sutures following hemostasis. Care was taken to realign the vermilion border before proceeding with the superficial closure.  Once the vermilion was realigned the superfical and mucosal closure was finished. Ftsg Text: The defect edges were debeveled with a #15 scalpel blade.  Given the location of the defect, shape of the defect and the proximity to free margins a full thickness skin graft was deemed most appropriate.  Using a sterile surgical marker, the primary defect shape was transferred to the donor site. The area thus outlined was incised deep to adipose tissue with a #15 scalpel blade.  The harvested graft was then trimmed of adipose tissue until only dermis and epidermis was left.  The skin margins of the secondary defect were undermined to an appropriate distance in all directions utilizing iris scissors.  The secondary defect was closed with interrupted buried subcutaneous sutures.  The skin edges were then re-apposed with running  sutures.  The skin graft was then placed in the primary defect and oriented appropriately. Split-Thickness Skin Graft Text: The defect edges were debeveled with a #15 scalpel blade.  Given the location of the defect, shape of the defect and the proximity to free margins a split thickness skin graft was deemed most appropriate.  Using a sterile surgical marker, the primary defect shape was transferred to the donor site. The split thickness graft was then harvested.  The skin graft was then placed in the primary defect and oriented appropriately. Pinch Graft Text: The defect edges were debeveled with a #15 scalpel blade. Given the location of the defect, shape of the defect and the proximity to free margins a pinch graft was deemed most appropriate. Using a sterile surgical marker, the primary defect shape was transferred to the donor site. The area thus outlined was incised deep to adipose tissue with a #15 scalpel blade.  The harvested graft was then trimmed of adipose tissue until only dermis and epidermis was left. The skin margins of the secondary defect were undermined to an appropriate distance in all directions utilizing iris scissors.  The secondary defect was closed with interrupted buried subcutaneous sutures.  The skin edges were then re-apposed with running  sutures.  The skin graft was then placed in the primary defect and oriented appropriately. Burow's Graft Text: The defect edges were debeveled with a #15 scalpel blade.  Given the location of the defect, shape of the defect, the proximity to free margins and the presence of a standing cone deformity a Burow's skin graft was deemed most appropriate. The standing cone was removed and this tissue was then trimmed to the shape of the primary defect. The adipose tissue was also removed until only dermis and epidermis were left.  The skin margins of the secondary defect were undermined to an appropriate distance in all directions utilizing iris scissors.  The secondary defect was closed with interrupted buried subcutaneous sutures.  The skin edges were then re-apposed with running  sutures.  The skin graft was then placed in the primary defect and oriented appropriately. Cartilage Graft Text: The defect edges were debeveled with a #15 scalpel blade.  Given the location of the defect, shape of the defect, the fact the defect involved a full thickness cartilage defect a cartilage graft was deemed most appropriate.  An appropriate donor site was identified, cleansed, and anesthetized. The cartilage graft was then harvested and transferred to the recipient site, oriented appropriately and then sutured into place.  The secondary defect was then repaired using a primary closure. Composite Graft Text: The defect edges were debeveled with a #15 scalpel blade.  Given the location of the defect, shape of the defect, the proximity to free margins and the fact the defect was full thickness a composite graft was deemed most appropriate.  The defect was outline and then transferred to the donor site.  A full thickness graft was then excised from the donor site. The graft was then placed in the primary defect, oriented appropriately and then sutured into place.  The secondary defect was then repaired using a primary closure. Epidermal Autograft Text: The defect edges were debeveled with a #15 scalpel blade.  Given the location of the defect, shape of the defect and the proximity to free margins an epidermal autograft was deemed most appropriate.  Using a sterile surgical marker, the primary defect shape was transferred to the donor site. The epidermal graft was then harvested.  The skin graft was then placed in the primary defect and oriented appropriately. Dermal Autograft Text: The defect edges were debeveled with a #15 scalpel blade.  Given the location of the defect, shape of the defect and the proximity to free margins a dermal autograft was deemed most appropriate.  Using a sterile surgical marker, the primary defect shape was transferred to the donor site. The area thus outlined was incised deep to adipose tissue with a #15 scalpel blade.  The harvested graft was then trimmed of adipose and epidermal tissue until only dermis was left.  The skin graft was then placed in the primary defect and oriented appropriately. Skin Substitute Text: The defect edges were debeveled with a #15 scalpel blade.  Given the location of the defect, shape of the defect and the proximity to free margins a skin substitute graft was deemed most appropriate.  The graft material was trimmed to fit the size of the defect. The graft was then placed in the primary defect and oriented appropriately. Tissue Cultured Epidermal Autograft Text: The defect edges were debeveled with a #15 scalpel blade.  Given the location of the defect, shape of the defect and the proximity to free margins a tissue cultured epidermal autograft was deemed most appropriate.  The graft was then trimmed to fit the size of the defect.  The graft was then placed in the primary defect and oriented appropriately. Xenograft Text: The defect edges were debeveled with a #15 scalpel blade.  Given the location of the defect, shape of the defect and the proximity to free margins a xenograft was deemed most appropriate.  The graft was then trimmed to fit the size of the defect.  The graft was then placed in the primary defect and oriented appropriately. Purse String (Simple) Text: Given the location of the defect and the characteristics of the surrounding skin a purse string closure was deemed most appropriate.  Undermining was performed circumferentially around the surgical defect.  A purse string suture was then placed and tightened. Purse String (Intermediate) Text: Given the location of the defect and the characteristics of the surrounding skin a purse string intermediate closure was deemed most appropriate.  Undermining was performed circumferentially around the surgical defect.  A purse string suture was then placed and tightened. Partial Purse String (Simple) Text: Given the location of the defect and the characteristics of the surrounding skin a simple purse string closure was deemed most appropriate.  Undermining was performed circumferentially around the surgical defect.  A purse string suture was then placed and tightened. Wound tension only allowed a partial closure of the circular defect. Partial Purse String (Intermediate) Text: Given the location of the defect and the characteristics of the surrounding skin an intermediate purse string closure was deemed most appropriate.  Undermining was performed circumferentially around the surgical defect.  A purse string suture was then placed and tightened. Wound tension only allowed a partial closure of the circular defect. Localized Dermabrasion With Wire Brush Text: The patient was draped in routine manner.  Localized dermabrasion using 3 x 17 mm wire brush was performed in routine manner to papillary dermis. This spot dermabrasion is being performed to complete skin cancer reconstruction. It also will eliminate the other sun damaged precancerous cells that are known to be part of the regional effect of a lifetime's worth of sun exposure. This localized dermabrasion is therapeutic and should not be considered cosmetic in any regard. Localized Dermabrasion With Sand Papertext: The patient was draped in routine manner.  Localized dermabrasion using sterile sand paper was performed in routine manner to papillary dermis. This spot dermabrasion is being performed to complete skin cancer reconstruction. It also will eliminate the other sun damaged precancerous cells that are known to be part of the regional effect of a lifetime's worth of sun exposure. This localized dermabrasion is therapeutic and should not be considered cosmetic in any regard. Localized Dermabrasion With 15 Blade Text: The patient was draped in routine manner.  Localized dermabrasion using a 15 blade was performed in routine manner to papillary dermis. This spot dermabrasion is being performed to complete skin cancer reconstruction. It also will eliminate the other sun damaged precancerous cells that are known to be part of the regional effect of a lifetime's worth of sun exposure. This localized dermabrasion is therapeutic and should not be considered cosmetic in any regard. Tarsorrhaphy Text: A tarsorrhaphy was performed using Frost sutures. Intermediate Repair And Flap Additional Text (Will Appearing After The Standard Complex Repair Text): The intermediate repair was not sufficient to completely close the primary defect. The remaining additional defect was repaired with the flap mentioned below. Intermediate Repair And Graft Additional Text (Will Appearing After The Standard Complex Repair Text): The intermediate repair was not sufficient to completely close the primary defect. The remaining additional defect was repaired with the graft mentioned below. Complex Repair And Flap Additional Text (Will Appearing After The Standard Complex Repair Text): The complex repair was not sufficient to completely close the primary defect. The remaining additional defect was repaired with the flap mentioned below. Complex Repair And Graft Additional Text (Will Appearing After The Standard Complex Repair Text): The complex repair was not sufficient to completely close the primary defect. The remaining additional defect was repaired with the graft mentioned below. Eyelid Full Thickness Repair - 44694: The eyelid defect was full thickness which required a wedge repair of the eyelid. Special care was taken to ensure that the eyelid margin was realligned when placing sutures. Eyelid Partial Thickness Repair - 28589: The eyelid defect was partial thickness which required a wedge repair of the eyelid. Special care was taken to ensure that the eyelid margin was realligned when placing sutures. Manual Repair Warning Statement: We plan on removing the manually selected variable below in favor of our much easier automatic structured text blocks found in the previous tab. We decided to do this to help make the flow better and give you the full power of structured data. Manual selection is never going to be ideal in our platform and I would encourage you to avoid using manual selection from this point on, especially since I will be sunsetting this feature. It is important that you do one of two things with the customized text below. First, you can save all of the text in a word file so you can have it for future reference. Second, transfer the text to the appropriate area in the Library tab. Lastly, if there is a flap or graft type which we do not have you need to let us know right away so I can add it in before the variable is hidden. No need to panic, we plan to give you roughly 6 months to make the change. Same Histology In Subsequent Stages Text: The pattern and morphology of the tumor is as described in the first stage. No Residual Tumor Seen Histology Text: There were no malignant cells seen in the sections examined. Inflammation Suggestive Of Cancer Camouflage Histology Text: There was a dense lymphocytic infiltrate which prevented adequate histologic evaluation of adjacent structures. Bcc Histology Text: There were numerous aggregates of basaloid cells. Bcc Infiltrative Histology Text: There were numerous aggregates of basaloid cells demonstrating an infiltrative pattern. Mart-1 - Positive Histology Text: MART-1 staining demonstrates areas of higher density and clustering of melanocytes with Pagetoid spread upwards within the epidermis. The surgical margins are positive for tumor cells. Mart-1 - Negative Histology Text: MART-1 staining demonstrates a normal density and pattern of melanocytes along the dermal-epidermal junction. The surgical margins are negative for tumor cells. Information: Selecting Yes will display possible errors in your note based on the variables you have selected. This validation is only offered as a suggestion for you. PLEASE NOTE THAT THE VALIDATION TEXT WILL BE REMOVED WHEN YOU FINALIZE YOUR NOTE. IF YOU WANT TO FAX A PRELIMINARY NOTE YOU WILL NEED TO TOGGLE THIS TO 'NO' IF YOU DO NOT WANT IT IN YOUR FAXED NOTE. Bill 59 Modifier?: No - Continue to Bill 79 Modifier

## 2024-12-27 NOTE — DISCHARGE NOTE PROVIDER - NSDCHHCONTACT_GEN_ALL_CORE_FT
Detail Level: Generalized Detail Level: Detailed As certified below, I, or a nurse practitioner or physician assistant working with me, had a face-to-face encounter that meets the physician face-to-face encounter requirements.